# Patient Record
Sex: FEMALE | Race: WHITE | Employment: OTHER | URBAN - METROPOLITAN AREA
[De-identification: names, ages, dates, MRNs, and addresses within clinical notes are randomized per-mention and may not be internally consistent; named-entity substitution may affect disease eponyms.]

---

## 2017-01-09 ENCOUNTER — OFFICE VISIT (OUTPATIENT)
Dept: INTERNAL MEDICINE CLINIC | Age: 72
End: 2017-01-09

## 2017-01-09 VITALS
TEMPERATURE: 98.2 F | DIASTOLIC BLOOD PRESSURE: 74 MMHG | SYSTOLIC BLOOD PRESSURE: 140 MMHG | HEART RATE: 68 BPM | HEIGHT: 65 IN | BODY MASS INDEX: 43.85 KG/M2 | WEIGHT: 263.2 LBS

## 2017-01-09 DIAGNOSIS — J02.9 SORE THROAT: Primary | ICD-10-CM

## 2017-01-09 PROCEDURE — 99213 OFFICE O/P EST LOW 20 MIN: CPT | Performed by: INTERNAL MEDICINE

## 2017-01-09 ASSESSMENT — ENCOUNTER SYMPTOMS
ABDOMINAL PAIN: 0
SORE THROAT: 1
COUGH: 0
ABDOMINAL DISTENTION: 0

## 2017-01-11 LAB — THROAT CULTURE: NORMAL

## 2017-01-26 RX ORDER — SIMVASTATIN 20 MG
TABLET ORAL
Qty: 90 TABLET | Refills: 0 | Status: SHIPPED | OUTPATIENT
Start: 2017-01-26 | End: 2017-04-23 | Stop reason: SDUPTHER

## 2017-02-02 RX ORDER — ESCITALOPRAM OXALATE 10 MG/1
TABLET ORAL
Qty: 30 TABLET | Refills: 5 | Status: SHIPPED | OUTPATIENT
Start: 2017-02-02 | End: 2017-07-31 | Stop reason: SDUPTHER

## 2017-02-08 RX ORDER — PEN NEEDLE, DIABETIC 31 GX5/16"
NEEDLE, DISPOSABLE MISCELLANEOUS
Qty: 100 EACH | Refills: 3 | Status: SHIPPED | OUTPATIENT
Start: 2017-02-08 | End: 2017-06-26 | Stop reason: SDUPTHER

## 2017-02-27 ENCOUNTER — OFFICE VISIT (OUTPATIENT)
Dept: INTERNAL MEDICINE CLINIC | Age: 72
End: 2017-02-27

## 2017-02-27 VITALS
TEMPERATURE: 98.2 F | WEIGHT: 274.2 LBS | BODY MASS INDEX: 45.68 KG/M2 | HEART RATE: 64 BPM | DIASTOLIC BLOOD PRESSURE: 60 MMHG | HEIGHT: 65 IN | SYSTOLIC BLOOD PRESSURE: 130 MMHG

## 2017-02-27 DIAGNOSIS — E66.9 DIABETES MELLITUS TYPE 2 IN OBESE (HCC): Primary | ICD-10-CM

## 2017-02-27 DIAGNOSIS — I10 ESSENTIAL HYPERTENSION: ICD-10-CM

## 2017-02-27 DIAGNOSIS — E11.69 DIABETES MELLITUS TYPE 2 IN OBESE (HCC): Primary | ICD-10-CM

## 2017-02-27 DIAGNOSIS — E66.01 MORBID OBESITY WITH BMI OF 45.0-49.9, ADULT (HCC): ICD-10-CM

## 2017-02-27 DIAGNOSIS — E78.5 HYPERLIPIDEMIA, UNSPECIFIED HYPERLIPIDEMIA TYPE: ICD-10-CM

## 2017-02-27 DIAGNOSIS — J40 BRONCHITIS: ICD-10-CM

## 2017-02-27 LAB
A/G RATIO: 1.3 (ref 1.1–2.2)
ALBUMIN SERPL-MCNC: 3.6 G/DL (ref 3.4–5)
ALP BLD-CCNC: 80 U/L (ref 40–129)
ALT SERPL-CCNC: 15 U/L (ref 10–40)
ANION GAP SERPL CALCULATED.3IONS-SCNC: 12 MMOL/L (ref 3–16)
AST SERPL-CCNC: 16 U/L (ref 15–37)
BILIRUB SERPL-MCNC: 0.3 MG/DL (ref 0–1)
BUN BLDV-MCNC: 11 MG/DL (ref 7–20)
CALCIUM SERPL-MCNC: 9 MG/DL (ref 8.3–10.6)
CHLORIDE BLD-SCNC: 103 MMOL/L (ref 99–110)
CHOLESTEROL, TOTAL: 159 MG/DL (ref 0–199)
CO2: 29 MMOL/L (ref 21–32)
CREAT SERPL-MCNC: 0.8 MG/DL (ref 0.6–1.2)
GFR AFRICAN AMERICAN: >60
GFR NON-AFRICAN AMERICAN: >60
GLOBULIN: 2.8 G/DL
GLUCOSE BLD-MCNC: 96 MG/DL (ref 70–99)
HBA1C MFR BLD: 7.7 %
HDLC SERPL-MCNC: 54 MG/DL (ref 40–60)
LDL CHOLESTEROL CALCULATED: 84 MG/DL
POTASSIUM SERPL-SCNC: 4.3 MMOL/L (ref 3.5–5.1)
SODIUM BLD-SCNC: 144 MMOL/L (ref 136–145)
TOTAL PROTEIN: 6.4 G/DL (ref 6.4–8.2)
TRIGL SERPL-MCNC: 104 MG/DL (ref 0–150)
VLDLC SERPL CALC-MCNC: 21 MG/DL

## 2017-02-27 PROCEDURE — 83036 HEMOGLOBIN GLYCOSYLATED A1C: CPT | Performed by: INTERNAL MEDICINE

## 2017-02-27 PROCEDURE — 99214 OFFICE O/P EST MOD 30 MIN: CPT | Performed by: INTERNAL MEDICINE

## 2017-02-27 RX ORDER — AZITHROMYCIN 250 MG/1
TABLET, FILM COATED ORAL
Qty: 1 PACKET | Refills: 0 | Status: SHIPPED | OUTPATIENT
Start: 2017-02-27 | End: 2017-03-09

## 2017-02-27 ASSESSMENT — ENCOUNTER SYMPTOMS
SHORTNESS OF BREATH: 0
WHEEZING: 0
CHEST TIGHTNESS: 0
COUGH: 1

## 2017-03-06 RX ORDER — INSULIN GLARGINE 100 [IU]/ML
INJECTION, SOLUTION SUBCUTANEOUS
Refills: 1 | OUTPATIENT
Start: 2017-03-06

## 2017-03-27 RX ORDER — PIOGLITAZONEHYDROCHLORIDE 15 MG/1
TABLET ORAL
Qty: 30 TABLET | Refills: 2 | Status: SHIPPED | OUTPATIENT
Start: 2017-03-27 | End: 2017-04-13 | Stop reason: SINTOL

## 2017-04-03 RX ORDER — INSULIN GLARGINE 100 [IU]/ML
INJECTION, SOLUTION SUBCUTANEOUS
Qty: 15 PEN | Refills: 1 | Status: SHIPPED | OUTPATIENT
Start: 2017-04-03 | End: 2017-05-25 | Stop reason: SDUPTHER

## 2017-04-10 RX ORDER — AMLODIPINE BESYLATE 10 MG/1
TABLET ORAL
Qty: 90 TABLET | Refills: 0 | Status: SHIPPED | OUTPATIENT
Start: 2017-04-10 | End: 2017-07-08 | Stop reason: SDUPTHER

## 2017-04-12 ENCOUNTER — TELEPHONE (OUTPATIENT)
Dept: INTERNAL MEDICINE CLINIC | Age: 72
End: 2017-04-12

## 2017-04-24 RX ORDER — LANCETS 30 GAUGE
1 EACH MISCELLANEOUS 3 TIMES DAILY
Qty: 100 EACH | Refills: 3 | Status: SHIPPED | OUTPATIENT
Start: 2017-04-24 | End: 2019-08-22 | Stop reason: SDUPTHER

## 2017-04-24 RX ORDER — SIMVASTATIN 20 MG
TABLET ORAL
Qty: 90 TABLET | Refills: 0 | Status: SHIPPED | OUTPATIENT
Start: 2017-04-24 | End: 2017-07-20 | Stop reason: SDUPTHER

## 2017-05-08 RX ORDER — GLIMEPIRIDE 4 MG/1
TABLET ORAL
Qty: 135 TABLET | Refills: 1 | Status: SHIPPED | OUTPATIENT
Start: 2017-05-08 | End: 2017-07-31 | Stop reason: SDUPTHER

## 2017-05-26 RX ORDER — INSULIN GLARGINE 100 [IU]/ML
INJECTION, SOLUTION SUBCUTANEOUS
Qty: 15 PEN | Refills: 3 | Status: SHIPPED | OUTPATIENT
Start: 2017-05-26 | End: 2017-06-26 | Stop reason: CLARIF

## 2017-06-26 ENCOUNTER — OFFICE VISIT (OUTPATIENT)
Dept: INTERNAL MEDICINE CLINIC | Age: 72
End: 2017-06-26

## 2017-06-26 VITALS
HEIGHT: 65 IN | WEIGHT: 283.8 LBS | BODY MASS INDEX: 47.28 KG/M2 | HEART RATE: 68 BPM | TEMPERATURE: 98.2 F | DIASTOLIC BLOOD PRESSURE: 70 MMHG | SYSTOLIC BLOOD PRESSURE: 160 MMHG

## 2017-06-26 DIAGNOSIS — E78.5 HYPERLIPIDEMIA, UNSPECIFIED HYPERLIPIDEMIA TYPE: ICD-10-CM

## 2017-06-26 DIAGNOSIS — E66.9 DIABETES MELLITUS TYPE 2 IN OBESE (HCC): Primary | ICD-10-CM

## 2017-06-26 DIAGNOSIS — E11.69 DIABETES MELLITUS TYPE 2 IN OBESE (HCC): Primary | ICD-10-CM

## 2017-06-26 DIAGNOSIS — R60.9 EDEMA, UNSPECIFIED TYPE: ICD-10-CM

## 2017-06-26 DIAGNOSIS — I10 ESSENTIAL HYPERTENSION: ICD-10-CM

## 2017-06-26 DIAGNOSIS — R01.1 SYSTOLIC MURMUR: ICD-10-CM

## 2017-06-26 LAB — HBA1C MFR BLD: 8.1 %

## 2017-06-26 PROCEDURE — 99214 OFFICE O/P EST MOD 30 MIN: CPT | Performed by: INTERNAL MEDICINE

## 2017-06-26 PROCEDURE — 83036 HEMOGLOBIN GLYCOSYLATED A1C: CPT | Performed by: INTERNAL MEDICINE

## 2017-06-26 RX ORDER — CHLORTHALIDONE 25 MG/1
25 TABLET ORAL DAILY
Qty: 30 TABLET | Refills: 3 | Status: SHIPPED | OUTPATIENT
Start: 2017-06-26 | End: 2017-07-31 | Stop reason: SDUPTHER

## 2017-06-26 ASSESSMENT — ENCOUNTER SYMPTOMS
CHEST TIGHTNESS: 0
COUGH: 0
WHEEZING: 0

## 2017-06-27 LAB
ANION GAP SERPL CALCULATED.3IONS-SCNC: 14 MMOL/L (ref 3–16)
BUN BLDV-MCNC: 14 MG/DL (ref 7–20)
CALCIUM SERPL-MCNC: 9.1 MG/DL (ref 8.3–10.6)
CHLORIDE BLD-SCNC: 102 MMOL/L (ref 99–110)
CO2: 28 MMOL/L (ref 21–32)
CREAT SERPL-MCNC: 0.8 MG/DL (ref 0.6–1.2)
CREATININE URINE: 256.5 MG/DL (ref 28–259)
GFR AFRICAN AMERICAN: >60
GFR NON-AFRICAN AMERICAN: >60
GLUCOSE BLD-MCNC: 64 MG/DL (ref 70–99)
MICROALBUMIN UR-MCNC: 1.3 MG/DL
MICROALBUMIN/CREAT UR-RTO: 5.1 MG/G (ref 0–30)
POTASSIUM SERPL-SCNC: 4 MMOL/L (ref 3.5–5.1)
SODIUM BLD-SCNC: 144 MMOL/L (ref 136–145)

## 2017-06-30 ENCOUNTER — TELEPHONE (OUTPATIENT)
Dept: INTERNAL MEDICINE CLINIC | Age: 72
End: 2017-06-30

## 2017-07-03 ENCOUNTER — TELEPHONE (OUTPATIENT)
Dept: INTERNAL MEDICINE CLINIC | Age: 72
End: 2017-07-03

## 2017-07-10 RX ORDER — AMLODIPINE BESYLATE 10 MG/1
TABLET ORAL
Qty: 90 TABLET | Refills: 0 | Status: SHIPPED | OUTPATIENT
Start: 2017-07-10 | End: 2017-10-05 | Stop reason: SDUPTHER

## 2017-07-20 RX ORDER — SIMVASTATIN 20 MG
TABLET ORAL
Qty: 90 TABLET | Refills: 0 | Status: SHIPPED | OUTPATIENT
Start: 2017-07-20 | End: 2017-10-14 | Stop reason: SDUPTHER

## 2017-07-31 ENCOUNTER — OFFICE VISIT (OUTPATIENT)
Dept: INTERNAL MEDICINE CLINIC | Age: 72
End: 2017-07-31

## 2017-07-31 VITALS
BODY MASS INDEX: 46.22 KG/M2 | TEMPERATURE: 98.3 F | WEIGHT: 277.4 LBS | SYSTOLIC BLOOD PRESSURE: 130 MMHG | HEART RATE: 70 BPM | HEIGHT: 65 IN | DIASTOLIC BLOOD PRESSURE: 70 MMHG

## 2017-07-31 DIAGNOSIS — E11.9 TYPE 2 DIABETES MELLITUS WITHOUT COMPLICATION, WITH LONG-TERM CURRENT USE OF INSULIN (HCC): Primary | ICD-10-CM

## 2017-07-31 DIAGNOSIS — Z79.4 TYPE 2 DIABETES MELLITUS WITHOUT COMPLICATION, WITH LONG-TERM CURRENT USE OF INSULIN (HCC): Primary | ICD-10-CM

## 2017-07-31 PROCEDURE — 99213 OFFICE O/P EST LOW 20 MIN: CPT | Performed by: INTERNAL MEDICINE

## 2017-07-31 RX ORDER — GLIMEPIRIDE 4 MG/1
TABLET ORAL
Qty: 135 TABLET | Refills: 2 | Status: SHIPPED | OUTPATIENT
Start: 2017-07-31 | End: 2018-10-01 | Stop reason: SDUPTHER

## 2017-07-31 RX ORDER — CHLORTHALIDONE 25 MG/1
25 TABLET ORAL DAILY
Qty: 30 TABLET | Refills: 3 | Status: SHIPPED | OUTPATIENT
Start: 2017-07-31 | End: 2018-02-17 | Stop reason: SDUPTHER

## 2017-07-31 RX ORDER — ESCITALOPRAM OXALATE 10 MG/1
TABLET ORAL
Qty: 30 TABLET | Refills: 5 | Status: SHIPPED | OUTPATIENT
Start: 2017-07-31 | End: 2017-12-27

## 2017-07-31 ASSESSMENT — ENCOUNTER SYMPTOMS
SHORTNESS OF BREATH: 0
CHEST TIGHTNESS: 0
WHEEZING: 0

## 2017-09-25 ENCOUNTER — OFFICE VISIT (OUTPATIENT)
Dept: INTERNAL MEDICINE CLINIC | Age: 72
End: 2017-09-25

## 2017-09-25 VITALS
HEIGHT: 64 IN | SYSTOLIC BLOOD PRESSURE: 136 MMHG | HEART RATE: 80 BPM | BODY MASS INDEX: 47.19 KG/M2 | TEMPERATURE: 98.3 F | WEIGHT: 276.4 LBS | DIASTOLIC BLOOD PRESSURE: 68 MMHG

## 2017-09-25 DIAGNOSIS — Z11.59 ENCOUNTER FOR HEPATITIS C SCREENING TEST FOR LOW RISK PATIENT: ICD-10-CM

## 2017-09-25 DIAGNOSIS — E11.69 DIABETES MELLITUS TYPE 2 IN OBESE (HCC): ICD-10-CM

## 2017-09-25 DIAGNOSIS — E11.69 DIABETES MELLITUS TYPE 2 IN OBESE (HCC): Primary | ICD-10-CM

## 2017-09-25 DIAGNOSIS — I10 ESSENTIAL HYPERTENSION: ICD-10-CM

## 2017-09-25 DIAGNOSIS — E66.9 DIABETES MELLITUS TYPE 2 IN OBESE (HCC): Primary | ICD-10-CM

## 2017-09-25 DIAGNOSIS — E66.9 DIABETES MELLITUS TYPE 2 IN OBESE (HCC): ICD-10-CM

## 2017-09-25 DIAGNOSIS — E78.5 HYPERLIPIDEMIA, UNSPECIFIED HYPERLIPIDEMIA TYPE: ICD-10-CM

## 2017-09-25 LAB
ANION GAP SERPL CALCULATED.3IONS-SCNC: 15 MMOL/L (ref 3–16)
BUN BLDV-MCNC: 16 MG/DL (ref 7–20)
CALCIUM SERPL-MCNC: 9.1 MG/DL (ref 8.3–10.6)
CHLORIDE BLD-SCNC: 100 MMOL/L (ref 99–110)
CHOLESTEROL, TOTAL: 148 MG/DL (ref 0–199)
CO2: 30 MMOL/L (ref 21–32)
CREAT SERPL-MCNC: 0.8 MG/DL (ref 0.6–1.2)
GFR AFRICAN AMERICAN: >60
GFR NON-AFRICAN AMERICAN: >60
GLUCOSE BLD-MCNC: 75 MG/DL (ref 70–99)
HBA1C MFR BLD: 8.2 %
HDLC SERPL-MCNC: 40 MG/DL (ref 40–60)
HEPATITIS C ANTIBODY INTERPRETATION: NORMAL
LDL CHOLESTEROL CALCULATED: 74 MG/DL
POTASSIUM SERPL-SCNC: 3.7 MMOL/L (ref 3.5–5.1)
SODIUM BLD-SCNC: 145 MMOL/L (ref 136–145)
TRIGL SERPL-MCNC: 169 MG/DL (ref 0–150)
VLDLC SERPL CALC-MCNC: 34 MG/DL

## 2017-09-25 PROCEDURE — 83036 HEMOGLOBIN GLYCOSYLATED A1C: CPT | Performed by: INTERNAL MEDICINE

## 2017-09-25 PROCEDURE — 99214 OFFICE O/P EST MOD 30 MIN: CPT | Performed by: INTERNAL MEDICINE

## 2017-09-25 ASSESSMENT — PATIENT HEALTH QUESTIONNAIRE - PHQ9
SUM OF ALL RESPONSES TO PHQ9 QUESTIONS 1 & 2: 1
SUM OF ALL RESPONSES TO PHQ QUESTIONS 1-9: 1
2. FEELING DOWN, DEPRESSED OR HOPELESS: 1
1. LITTLE INTEREST OR PLEASURE IN DOING THINGS: 0

## 2017-09-25 ASSESSMENT — ENCOUNTER SYMPTOMS
WHEEZING: 0
CHEST TIGHTNESS: 0
COUGH: 0

## 2017-10-05 RX ORDER — AMLODIPINE BESYLATE 10 MG/1
TABLET ORAL
Qty: 90 TABLET | Refills: 0 | Status: SHIPPED | OUTPATIENT
Start: 2017-10-05 | End: 2017-10-23 | Stop reason: SINTOL

## 2017-10-16 RX ORDER — SIMVASTATIN 20 MG
TABLET ORAL
Qty: 90 TABLET | Refills: 0 | Status: SHIPPED | OUTPATIENT
Start: 2017-10-16 | End: 2018-01-17 | Stop reason: SDUPTHER

## 2017-10-23 RX ORDER — LABETALOL 100 MG/1
100 TABLET, FILM COATED ORAL 2 TIMES DAILY
Qty: 60 TABLET | Refills: 3 | Status: SHIPPED | OUTPATIENT
Start: 2017-10-23 | End: 2017-12-27 | Stop reason: ALTCHOICE

## 2017-12-01 RX ORDER — INSULIN HUMAN 100 [IU]/ML
INJECTION, SUSPENSION SUBCUTANEOUS
Qty: 15 PEN | Refills: 0 | Status: SHIPPED | OUTPATIENT
Start: 2017-12-01 | End: 2017-12-20 | Stop reason: SDUPTHER

## 2017-12-18 ENCOUNTER — TELEPHONE (OUTPATIENT)
Dept: OTHER | Facility: CLINIC | Age: 72
End: 2017-12-18

## 2017-12-18 NOTE — TELEPHONE ENCOUNTER
Telephone Outcome: Left voice message.  You do not need an order to get a mammogram done as long as you go to a [facility]) [facility]

## 2017-12-27 ENCOUNTER — OFFICE VISIT (OUTPATIENT)
Dept: INTERNAL MEDICINE CLINIC | Age: 72
End: 2017-12-27

## 2017-12-27 VITALS
SYSTOLIC BLOOD PRESSURE: 126 MMHG | TEMPERATURE: 98.1 F | WEIGHT: 276 LBS | HEIGHT: 64 IN | BODY MASS INDEX: 47.12 KG/M2 | HEART RATE: 75 BPM | DIASTOLIC BLOOD PRESSURE: 62 MMHG

## 2017-12-27 DIAGNOSIS — Z23 NEEDS FLU SHOT: ICD-10-CM

## 2017-12-27 DIAGNOSIS — L03.119 CELLULITIS OF LOWER EXTREMITY, UNSPECIFIED LATERALITY: ICD-10-CM

## 2017-12-27 DIAGNOSIS — I10 ESSENTIAL HYPERTENSION: ICD-10-CM

## 2017-12-27 DIAGNOSIS — Z12.11 SCREEN FOR COLON CANCER: ICD-10-CM

## 2017-12-27 DIAGNOSIS — I87.8 VENOUS STASIS: ICD-10-CM

## 2017-12-27 DIAGNOSIS — E11.9 TYPE 2 DIABETES MELLITUS WITHOUT COMPLICATION, WITH LONG-TERM CURRENT USE OF INSULIN (HCC): ICD-10-CM

## 2017-12-27 DIAGNOSIS — E66.9 DIABETES MELLITUS TYPE 2 IN OBESE (HCC): Primary | ICD-10-CM

## 2017-12-27 DIAGNOSIS — Z79.4 TYPE 2 DIABETES MELLITUS WITHOUT COMPLICATION, WITH LONG-TERM CURRENT USE OF INSULIN (HCC): ICD-10-CM

## 2017-12-27 DIAGNOSIS — E78.5 HYPERLIPIDEMIA, UNSPECIFIED HYPERLIPIDEMIA TYPE: ICD-10-CM

## 2017-12-27 DIAGNOSIS — E11.69 DIABETES MELLITUS TYPE 2 IN OBESE (HCC): Primary | ICD-10-CM

## 2017-12-27 LAB
ANION GAP SERPL CALCULATED.3IONS-SCNC: 14 MMOL/L (ref 3–16)
BUN BLDV-MCNC: 15 MG/DL (ref 7–20)
CALCIUM SERPL-MCNC: 9.3 MG/DL (ref 8.3–10.6)
CHLORIDE BLD-SCNC: 96 MMOL/L (ref 99–110)
CO2: 30 MMOL/L (ref 21–32)
CREAT SERPL-MCNC: 0.8 MG/DL (ref 0.6–1.2)
GFR AFRICAN AMERICAN: >60
GFR NON-AFRICAN AMERICAN: >60
GLUCOSE BLD-MCNC: 186 MG/DL (ref 70–99)
HBA1C MFR BLD: 11.8 %
POTASSIUM SERPL-SCNC: 4 MMOL/L (ref 3.5–5.1)
SODIUM BLD-SCNC: 140 MMOL/L (ref 136–145)

## 2017-12-27 PROCEDURE — G8484 FLU IMMUNIZE NO ADMIN: HCPCS | Performed by: INTERNAL MEDICINE

## 2017-12-27 PROCEDURE — 83036 HEMOGLOBIN GLYCOSYLATED A1C: CPT | Performed by: INTERNAL MEDICINE

## 2017-12-27 PROCEDURE — 1123F ACP DISCUSS/DSCN MKR DOCD: CPT | Performed by: INTERNAL MEDICINE

## 2017-12-27 PROCEDURE — 99214 OFFICE O/P EST MOD 30 MIN: CPT | Performed by: INTERNAL MEDICINE

## 2017-12-27 PROCEDURE — 4040F PNEUMOC VAC/ADMIN/RCVD: CPT | Performed by: INTERNAL MEDICINE

## 2017-12-27 PROCEDURE — 90662 IIV NO PRSV INCREASED AG IM: CPT | Performed by: INTERNAL MEDICINE

## 2017-12-27 PROCEDURE — G8399 PT W/DXA RESULTS DOCUMENT: HCPCS | Performed by: INTERNAL MEDICINE

## 2017-12-27 PROCEDURE — 3014F SCREEN MAMMO DOC REV: CPT | Performed by: INTERNAL MEDICINE

## 2017-12-27 PROCEDURE — G8417 CALC BMI ABV UP PARAM F/U: HCPCS | Performed by: INTERNAL MEDICINE

## 2017-12-27 PROCEDURE — G0008 ADMIN INFLUENZA VIRUS VAC: HCPCS | Performed by: INTERNAL MEDICINE

## 2017-12-27 PROCEDURE — 1090F PRES/ABSN URINE INCON ASSESS: CPT | Performed by: INTERNAL MEDICINE

## 2017-12-27 PROCEDURE — G8427 DOCREV CUR MEDS BY ELIG CLIN: HCPCS | Performed by: INTERNAL MEDICINE

## 2017-12-27 PROCEDURE — 3017F COLORECTAL CA SCREEN DOC REV: CPT | Performed by: INTERNAL MEDICINE

## 2017-12-27 PROCEDURE — 3046F HEMOGLOBIN A1C LEVEL >9.0%: CPT | Performed by: INTERNAL MEDICINE

## 2017-12-27 PROCEDURE — 1036F TOBACCO NON-USER: CPT | Performed by: INTERNAL MEDICINE

## 2017-12-27 RX ORDER — CEPHALEXIN 500 MG/1
500 CAPSULE ORAL 4 TIMES DAILY
Qty: 28 CAPSULE | Refills: 0 | Status: SHIPPED | OUTPATIENT
Start: 2017-12-27 | End: 2018-02-12 | Stop reason: ALTCHOICE

## 2017-12-27 ASSESSMENT — ENCOUNTER SYMPTOMS
SHORTNESS OF BREATH: 0
WHEEZING: 0
ABDOMINAL DISTENTION: 0
CHEST TIGHTNESS: 0

## 2017-12-27 NOTE — PROGRESS NOTES
Subjective:      Patient ID: Mika Garnica is a 67 y.o. female. HPI  She has stopped the lexapro. She felt that this was causing muscle cramps in her legs   She reports her blood sugars are up and down   She is starting in [employer] to exercise     Fasting sugars run around 100-120   No hypoglycemia   Post prandial runs 150-160      Review of Systems   Respiratory: Negative for chest tightness, shortness of breath and wheezing. Cardiovascular: Positive for leg swelling. Gastrointestinal: Negative for abdominal distention. Objective:   Physical Exam   Constitutional: She is oriented to person, place, and time. She appears well-developed and well-nourished. HENT:   Mouth/Throat: Oropharyngeal exudate present. Eyes: Conjunctivae are normal. Pupils are equal, round, and reactive to light. Neck: No thyromegaly present. Cardiovascular: Normal rate, regular rhythm, normal heart sounds and intact distal pulses. Exam reveals no gallop and no friction rub. No murmur heard. Pulmonary/Chest: Effort normal and breath sounds normal. No respiratory distress. She has no wheezes. She has no rales. She exhibits no tenderness. Musculoskeletal: She exhibits edema (one plus edema lower legs with some stasis changes present ). Neurological: She is alert and oriented to person, place, and time. Decreased sensation monofilament both great toe   Skin: Skin is warm and dry. No significant callous formation or ulcers of the feet  Nails great toes crumbled hypertrophic  She also has some changes both legs consistent with cellulitis      Psychiatric: She has a normal mood and affect. Assessment:         1. Diabetes mellitus type 2 in [facility])  Basic Metabolic Panel    POCT glycosylated hemoglobin (Hb A1C)   2. Essential hypertension     3. Hyperlipidemia, unspecified hyperlipidemia type     4. Screen for colon cancer     5.  Type 2 diabetes mellitus without complication, with long-term current use of insulin ([facility] [address]     6. Venous stasis     7. Cellulitis of lower extremity, unspecified laterality  POCT Fecal Immunochemical Test (FIT)   8. Needs flu shot             Plan:       A 1c over 11!!    This does not correlate to what her reported sugars are  I asked her to be honest with what she is doing and reporting   She does admit that she has not been eating well   She states she has a new machine  I would like her to call next week with fasting and pre dinner glucoses   If elevated will adjust the insulin   She did not tolerate regular insulin  I once again explained that the sugars she is reporting are not correlating to the A 1 C   The 11.8 is closer to sugars in high 200-300's  May need endo referral    She states she mailed fit test  Will re submit    She is getting stasis dermatitis and cellulitis  The lexapro is NOT causing this  Elevation compression and keflex      Patient Instructions   Use lamisil cream over the counter on feet   Check your pre dinner blood sugars and call with fasting and pre dinner glucoses   Get your machine checked at the drugstore

## 2018-01-03 ENCOUNTER — TELEPHONE (OUTPATIENT)
Dept: INTERNAL MEDICINE CLINIC | Age: 73
End: 2018-01-03

## 2018-01-03 NOTE — TELEPHONE ENCOUNTER
Have her increase her morning insulin to 50 units  Once it warms up I will need fasting and before dinner readings to further adjust insulin

## 2018-01-15 RX ORDER — PEN NEEDLE, DIABETIC 31 GX5/16"
NEEDLE, DISPOSABLE MISCELLANEOUS
Qty: 100 EACH | Refills: 2 | Status: SHIPPED | OUTPATIENT
Start: 2018-01-15 | End: 2018-05-15

## 2018-01-17 RX ORDER — SIMVASTATIN 20 MG
TABLET ORAL
Qty: 90 TABLET | Refills: 0 | Status: SHIPPED | OUTPATIENT
Start: 2018-01-17 | End: 2018-04-18 | Stop reason: SDUPTHER

## 2018-02-09 ENCOUNTER — TELEPHONE (OUTPATIENT)
Dept: INTERNAL MEDICINE CLINIC | Age: 73
End: 2018-02-09

## 2018-02-12 ENCOUNTER — OFFICE VISIT (OUTPATIENT)
Dept: INTERNAL MEDICINE CLINIC | Age: 73
End: 2018-02-12

## 2018-02-12 VITALS
DIASTOLIC BLOOD PRESSURE: 58 MMHG | HEART RATE: 85 BPM | BODY MASS INDEX: 45.85 KG/M2 | TEMPERATURE: 97.6 F | WEIGHT: 268.6 LBS | SYSTOLIC BLOOD PRESSURE: 122 MMHG | HEIGHT: 64 IN

## 2018-02-12 DIAGNOSIS — J06.9 UPPER RESPIRATORY TRACT INFECTION, UNSPECIFIED TYPE: Primary | ICD-10-CM

## 2018-02-12 LAB
INFLUENZA A ANTIBODY: NORMAL
INFLUENZA B ANTIBODY: NORMAL

## 2018-02-12 PROCEDURE — G8399 PT W/DXA RESULTS DOCUMENT: HCPCS | Performed by: INTERNAL MEDICINE

## 2018-02-12 PROCEDURE — 4040F PNEUMOC VAC/ADMIN/RCVD: CPT | Performed by: INTERNAL MEDICINE

## 2018-02-12 PROCEDURE — 3014F SCREEN MAMMO DOC REV: CPT | Performed by: INTERNAL MEDICINE

## 2018-02-12 PROCEDURE — 1036F TOBACCO NON-USER: CPT | Performed by: INTERNAL MEDICINE

## 2018-02-12 PROCEDURE — G8427 DOCREV CUR MEDS BY ELIG CLIN: HCPCS | Performed by: INTERNAL MEDICINE

## 2018-02-12 PROCEDURE — G8484 FLU IMMUNIZE NO ADMIN: HCPCS | Performed by: INTERNAL MEDICINE

## 2018-02-12 PROCEDURE — 99213 OFFICE O/P EST LOW 20 MIN: CPT | Performed by: INTERNAL MEDICINE

## 2018-02-12 PROCEDURE — 1123F ACP DISCUSS/DSCN MKR DOCD: CPT | Performed by: INTERNAL MEDICINE

## 2018-02-12 PROCEDURE — 3017F COLORECTAL CA SCREEN DOC REV: CPT | Performed by: INTERNAL MEDICINE

## 2018-02-12 PROCEDURE — 1090F PRES/ABSN URINE INCON ASSESS: CPT | Performed by: INTERNAL MEDICINE

## 2018-02-12 PROCEDURE — G8417 CALC BMI ABV UP PARAM F/U: HCPCS | Performed by: INTERNAL MEDICINE

## 2018-02-12 PROCEDURE — 87804 INFLUENZA ASSAY W/OPTIC: CPT | Performed by: INTERNAL MEDICINE

## 2018-02-12 RX ORDER — AMOXICILLIN AND CLAVULANATE POTASSIUM 875; 125 MG/1; MG/1
1 TABLET, FILM COATED ORAL 2 TIMES DAILY
Qty: 20 TABLET | Refills: 0 | Status: SHIPPED | OUTPATIENT
Start: 2018-02-12 | End: 2018-04-05 | Stop reason: ALTCHOICE

## 2018-02-12 RX ORDER — LABETALOL 100 MG/1
100 TABLET, FILM COATED ORAL 2 TIMES DAILY
Qty: 60 TABLET | Refills: 3 | COMMUNITY
Start: 2018-02-12 | End: 2018-02-19 | Stop reason: SDUPTHER

## 2018-02-12 ASSESSMENT — ENCOUNTER SYMPTOMS
SINUS PRESSURE: 1
SINUS PAIN: 1
SHORTNESS OF BREATH: 0
SORE THROAT: 1
COUGH: 1
WHEEZING: 0

## 2018-02-12 NOTE — PROGRESS NOTES
Subjective:      Patient ID: Robert Officer is a 67 y.o. female. HPI  Has not been checking her blood glucoses. She is here for URI symptoms  They began on THursday  She had some sore throat and uvula was swelling. She has been having hoarseness and sinus congestion  She has also had a cough  No one else ill at this time     Review of Systems   Constitutional: Negative for fever. HENT: Positive for congestion, sinus pain, sinus pressure and sore throat. Respiratory: Positive for cough. Negative for shortness of breath and wheezing. Objective:   Physical Exam   Constitutional: She appears well-developed and well-nourished. HENT:   Right Ear: External ear normal.   Left Ear: External ear normal.   Mouth/Throat: No oropharyngeal exudate (red  uvula swollen). Eyes: EOM are normal. Pupils are equal, round, and reactive to light. Neck: No thyromegaly present. Cardiovascular: Normal rate, regular rhythm, normal heart sounds and intact distal pulses. Exam reveals no gallop and no friction rub. No murmur heard. Pulmonary/Chest: Effort normal and breath sounds normal. No respiratory distress. She has no wheezes. She has no rales. Lymphadenopathy:     She has no cervical adenopathy. Assessment:         1. Upper respiratory tract infection, unspecified type  POCT Influenza A/B            Plan:       rapid flu negative  Treat sinus infection  Patient Instructions   Take the antibiotic one tablet twice a day for five days  Take this with food  If your symptoms have significantly improved you may stop the medication. It not complete an entire 10 day course   Prn nasal saline  Coricidin hpb for symptoms  Call if symptoms worsen or do not improve.

## 2018-02-19 RX ORDER — CHLORTHALIDONE 25 MG/1
TABLET ORAL
Qty: 30 TABLET | Refills: 2 | Status: SHIPPED | OUTPATIENT
Start: 2018-02-19 | End: 2018-05-20 | Stop reason: SDUPTHER

## 2018-02-19 RX ORDER — LABETALOL 100 MG/1
TABLET, FILM COATED ORAL
Qty: 60 TABLET | Refills: 2 | Status: SHIPPED | OUTPATIENT
Start: 2018-02-19 | End: 2018-05-20 | Stop reason: SDUPTHER

## 2018-02-26 ENCOUNTER — TELEPHONE (OUTPATIENT)
Dept: INTERNAL MEDICINE CLINIC | Age: 73
End: 2018-02-26

## 2018-02-26 NOTE — TELEPHONE ENCOUNTER
PT CALLING SAYING SHE IS STILL WAITING ON AN IMMUNIZATION FORM TO BE FAXED TO HER BOSS.    SHE SAYS SHE GETS OFF AT NOON AND HOPES IT CAN GET TO HER BOSS BY THEN

## 2018-03-27 ENCOUNTER — OFFICE VISIT (OUTPATIENT)
Dept: INTERNAL MEDICINE CLINIC | Age: 73
End: 2018-03-27

## 2018-03-27 VITALS
BODY MASS INDEX: 46.06 KG/M2 | TEMPERATURE: 97.7 F | HEART RATE: 72 BPM | SYSTOLIC BLOOD PRESSURE: 124 MMHG | HEIGHT: 64 IN | DIASTOLIC BLOOD PRESSURE: 62 MMHG | WEIGHT: 269.8 LBS

## 2018-03-27 DIAGNOSIS — I89.0 LYMPHEDEMA: ICD-10-CM

## 2018-03-27 DIAGNOSIS — E66.01 MORBID OBESITY WITH BMI OF 45.0-49.9, ADULT (HCC): ICD-10-CM

## 2018-03-27 DIAGNOSIS — B35.3 TINEA PEDIS OF BOTH FEET: ICD-10-CM

## 2018-03-27 DIAGNOSIS — E66.9 DIABETES MELLITUS TYPE 2 IN OBESE (HCC): Primary | ICD-10-CM

## 2018-03-27 DIAGNOSIS — Z12.11 SCREENING FOR MALIGNANT NEOPLASM OF COLON: ICD-10-CM

## 2018-03-27 DIAGNOSIS — E11.69 DIABETES MELLITUS TYPE 2 IN OBESE (HCC): Primary | ICD-10-CM

## 2018-03-27 DIAGNOSIS — Z12.39 SCREENING FOR MALIGNANT NEOPLASM OF BREAST: ICD-10-CM

## 2018-03-27 DIAGNOSIS — I10 ESSENTIAL HYPERTENSION: ICD-10-CM

## 2018-03-27 DIAGNOSIS — L03.115 CELLULITIS OF RIGHT LOWER EXTREMITY: ICD-10-CM

## 2018-03-27 LAB
A/G RATIO: 1.2 (ref 1.1–2.2)
ALBUMIN SERPL-MCNC: 3.5 G/DL (ref 3.4–5)
ALP BLD-CCNC: 83 U/L (ref 40–129)
ALT SERPL-CCNC: 18 U/L (ref 10–40)
ANION GAP SERPL CALCULATED.3IONS-SCNC: 13 MMOL/L (ref 3–16)
AST SERPL-CCNC: 19 U/L (ref 15–37)
BILIRUB SERPL-MCNC: 0.3 MG/DL (ref 0–1)
BUN BLDV-MCNC: 12 MG/DL (ref 7–20)
CALCIUM SERPL-MCNC: 8.9 MG/DL (ref 8.3–10.6)
CHLORIDE BLD-SCNC: 94 MMOL/L (ref 99–110)
CHOLESTEROL, TOTAL: 146 MG/DL (ref 0–199)
CO2: 34 MMOL/L (ref 21–32)
CREAT SERPL-MCNC: 0.8 MG/DL (ref 0.6–1.2)
GFR AFRICAN AMERICAN: >60
GFR NON-AFRICAN AMERICAN: >60
GLOBULIN: 3 G/DL
GLUCOSE BLD-MCNC: 144 MG/DL (ref 70–99)
HBA1C MFR BLD: 10.9 %
HDLC SERPL-MCNC: 40 MG/DL (ref 40–60)
LDL CHOLESTEROL CALCULATED: 75 MG/DL
POTASSIUM SERPL-SCNC: 3.7 MMOL/L (ref 3.5–5.1)
SODIUM BLD-SCNC: 141 MMOL/L (ref 136–145)
TOTAL PROTEIN: 6.5 G/DL (ref 6.4–8.2)
TRIGL SERPL-MCNC: 153 MG/DL (ref 0–150)
VLDLC SERPL CALC-MCNC: 31 MG/DL

## 2018-03-27 PROCEDURE — 1123F ACP DISCUSS/DSCN MKR DOCD: CPT | Performed by: INTERNAL MEDICINE

## 2018-03-27 PROCEDURE — 83036 HEMOGLOBIN GLYCOSYLATED A1C: CPT | Performed by: INTERNAL MEDICINE

## 2018-03-27 PROCEDURE — G8417 CALC BMI ABV UP PARAM F/U: HCPCS | Performed by: INTERNAL MEDICINE

## 2018-03-27 PROCEDURE — 1090F PRES/ABSN URINE INCON ASSESS: CPT | Performed by: INTERNAL MEDICINE

## 2018-03-27 PROCEDURE — G8482 FLU IMMUNIZE ORDER/ADMIN: HCPCS | Performed by: INTERNAL MEDICINE

## 2018-03-27 PROCEDURE — 4040F PNEUMOC VAC/ADMIN/RCVD: CPT | Performed by: INTERNAL MEDICINE

## 2018-03-27 PROCEDURE — 3046F HEMOGLOBIN A1C LEVEL >9.0%: CPT | Performed by: INTERNAL MEDICINE

## 2018-03-27 PROCEDURE — G8427 DOCREV CUR MEDS BY ELIG CLIN: HCPCS | Performed by: INTERNAL MEDICINE

## 2018-03-27 PROCEDURE — G8399 PT W/DXA RESULTS DOCUMENT: HCPCS | Performed by: INTERNAL MEDICINE

## 2018-03-27 PROCEDURE — 3017F COLORECTAL CA SCREEN DOC REV: CPT | Performed by: INTERNAL MEDICINE

## 2018-03-27 PROCEDURE — 99214 OFFICE O/P EST MOD 30 MIN: CPT | Performed by: INTERNAL MEDICINE

## 2018-03-27 PROCEDURE — 1036F TOBACCO NON-USER: CPT | Performed by: INTERNAL MEDICINE

## 2018-03-27 PROCEDURE — 3014F SCREEN MAMMO DOC REV: CPT | Performed by: INTERNAL MEDICINE

## 2018-03-27 RX ORDER — TERBINAFINE HYDROCHLORIDE 250 MG/1
250 TABLET ORAL DAILY
Qty: 14 TABLET | Refills: 0 | Status: SHIPPED | OUTPATIENT
Start: 2018-03-27 | End: 2018-04-10

## 2018-03-27 RX ORDER — CEPHALEXIN 500 MG/1
500 CAPSULE ORAL 4 TIMES DAILY
Qty: 40 CAPSULE | Refills: 0 | Status: SHIPPED | OUTPATIENT
Start: 2018-03-27 | End: 2018-04-06

## 2018-03-27 RX ORDER — BLOOD-GLUCOSE METER
KIT MISCELLANEOUS
Qty: 1 KIT | Refills: 0 | Status: SHIPPED | OUTPATIENT
Start: 2018-03-27

## 2018-03-27 ASSESSMENT — ENCOUNTER SYMPTOMS: COLOR CHANGE: 1

## 2018-04-03 ENCOUNTER — TELEPHONE (OUTPATIENT)
Dept: INTERNAL MEDICINE CLINIC | Age: 73
End: 2018-04-03

## 2018-04-05 ENCOUNTER — HOSPITAL ENCOUNTER (OUTPATIENT)
Dept: WOUND CARE | Age: 73
Discharge: HOME | End: 2018-04-05
Attending: PODIATRIST | Admitting: PODIATRIST

## 2018-04-05 VITALS
WEIGHT: 269 LBS | TEMPERATURE: 97.9 F | RESPIRATION RATE: 18 BRPM | BODY MASS INDEX: 45.93 KG/M2 | DIASTOLIC BLOOD PRESSURE: 72 MMHG | HEIGHT: 64 IN | SYSTOLIC BLOOD PRESSURE: 122 MMHG | HEART RATE: 74 BPM

## 2018-04-05 DIAGNOSIS — L97.229 VARICOSE VEINS OF LEFT LOWER EXTREMITY WITH BOTH ULCER OF CALF AND INFLAMMATION (HCC): ICD-10-CM

## 2018-04-05 DIAGNOSIS — I83.222 VARICOSE VEINS OF LEFT LOWER EXTREMITY WITH BOTH ULCER OF CALF AND INFLAMMATION (HCC): ICD-10-CM

## 2018-04-05 DIAGNOSIS — I83.212 VARICOSE VEINS OF RIGHT LOWER EXTREMITY WITH BOTH ULCER OF CALF AND INFLAMMATION (HCC): ICD-10-CM

## 2018-04-05 DIAGNOSIS — L97.219 VARICOSE VEINS OF RIGHT LOWER EXTREMITY WITH BOTH ULCER OF CALF AND INFLAMMATION (HCC): ICD-10-CM

## 2018-04-05 LAB
GLUCOSE BLD-MCNC: 210 MG/DL (ref 70–99)
PERFORMED ON: ABNORMAL

## 2018-04-05 RX ORDER — AMLODIPINE BESYLATE 10 MG/1
10 TABLET ORAL DAILY
COMMUNITY
End: 2019-10-22 | Stop reason: ALTCHOICE

## 2018-04-11 DIAGNOSIS — Z12.11 SCREENING FOR MALIGNANT NEOPLASM OF COLON: ICD-10-CM

## 2018-04-11 DIAGNOSIS — L03.119 CELLULITIS OF LOWER EXTREMITY, UNSPECIFIED LATERALITY: ICD-10-CM

## 2018-04-11 LAB
CONTROL: NORMAL
HEMOCCULT STL QL: NORMAL

## 2018-04-11 PROCEDURE — 82274 ASSAY TEST FOR BLOOD FECAL: CPT | Performed by: INTERNAL MEDICINE

## 2018-04-12 ENCOUNTER — HOSPITAL ENCOUNTER (OUTPATIENT)
Dept: WOUND CARE | Age: 73
Discharge: HOME | End: 2018-04-12
Attending: PODIATRIST | Admitting: PODIATRIST

## 2018-04-12 VITALS
HEART RATE: 71 BPM | TEMPERATURE: 98.4 F | SYSTOLIC BLOOD PRESSURE: 161 MMHG | RESPIRATION RATE: 16 BRPM | DIASTOLIC BLOOD PRESSURE: 67 MMHG

## 2018-04-12 DIAGNOSIS — L97.219 VARICOSE VEINS OF RIGHT LOWER EXTREMITY WITH BOTH ULCER OF CALF AND INFLAMMATION (HCC): Primary | ICD-10-CM

## 2018-04-12 DIAGNOSIS — I83.222 VARICOSE VEINS OF LEFT LOWER EXTREMITY WITH BOTH ULCER OF CALF AND INFLAMMATION (HCC): ICD-10-CM

## 2018-04-12 DIAGNOSIS — L97.229 VARICOSE VEINS OF LEFT LOWER EXTREMITY WITH BOTH ULCER OF CALF AND INFLAMMATION (HCC): ICD-10-CM

## 2018-04-12 DIAGNOSIS — I83.212 VARICOSE VEINS OF RIGHT LOWER EXTREMITY WITH BOTH ULCER OF CALF AND INFLAMMATION (HCC): Primary | ICD-10-CM

## 2018-04-18 RX ORDER — SIMVASTATIN 20 MG
TABLET ORAL
Qty: 90 TABLET | Refills: 0 | Status: SHIPPED | OUTPATIENT
Start: 2018-04-18 | End: 2018-07-17 | Stop reason: SDUPTHER

## 2018-04-19 ENCOUNTER — HOSPITAL ENCOUNTER (OUTPATIENT)
Dept: WOUND CARE | Age: 73
Discharge: HOME | End: 2018-04-19
Attending: PODIATRIST | Admitting: PODIATRIST

## 2018-04-19 VITALS
DIASTOLIC BLOOD PRESSURE: 74 MMHG | SYSTOLIC BLOOD PRESSURE: 159 MMHG | HEART RATE: 77 BPM | TEMPERATURE: 97.7 F | RESPIRATION RATE: 16 BRPM

## 2018-04-19 DIAGNOSIS — I83.212 VARICOSE VEINS OF RIGHT LOWER EXTREMITY WITH BOTH ULCER OF CALF AND INFLAMMATION (HCC): Primary | ICD-10-CM

## 2018-04-19 DIAGNOSIS — L97.229 VARICOSE VEINS OF LEFT LOWER EXTREMITY WITH BOTH ULCER OF CALF AND INFLAMMATION (HCC): ICD-10-CM

## 2018-04-19 DIAGNOSIS — L97.219 VARICOSE VEINS OF RIGHT LOWER EXTREMITY WITH BOTH ULCER OF CALF AND INFLAMMATION (HCC): Primary | ICD-10-CM

## 2018-04-19 DIAGNOSIS — I83.222 VARICOSE VEINS OF LEFT LOWER EXTREMITY WITH BOTH ULCER OF CALF AND INFLAMMATION (HCC): ICD-10-CM

## 2018-04-30 ENCOUNTER — TELEPHONE (OUTPATIENT)
Dept: WOUND CARE | Age: 73
End: 2018-04-30

## 2018-05-15 ENCOUNTER — OFFICE VISIT (OUTPATIENT)
Dept: ENDOCRINOLOGY | Age: 73
End: 2018-05-15

## 2018-05-15 VITALS
WEIGHT: 264.2 LBS | OXYGEN SATURATION: 93 % | HEART RATE: 76 BPM | HEIGHT: 65 IN | RESPIRATION RATE: 16 BRPM | BODY MASS INDEX: 44.02 KG/M2 | DIASTOLIC BLOOD PRESSURE: 80 MMHG | SYSTOLIC BLOOD PRESSURE: 139 MMHG

## 2018-05-15 DIAGNOSIS — I10 ESSENTIAL HYPERTENSION: Primary | ICD-10-CM

## 2018-05-15 LAB — HBA1C MFR BLD: 9.6 %

## 2018-05-15 PROCEDURE — 3046F HEMOGLOBIN A1C LEVEL >9.0%: CPT | Performed by: INTERNAL MEDICINE

## 2018-05-15 PROCEDURE — G8417 CALC BMI ABV UP PARAM F/U: HCPCS | Performed by: INTERNAL MEDICINE

## 2018-05-15 PROCEDURE — 83036 HEMOGLOBIN GLYCOSYLATED A1C: CPT | Performed by: INTERNAL MEDICINE

## 2018-05-15 PROCEDURE — 99205 OFFICE O/P NEW HI 60 MIN: CPT | Performed by: INTERNAL MEDICINE

## 2018-05-15 PROCEDURE — G8399 PT W/DXA RESULTS DOCUMENT: HCPCS | Performed by: INTERNAL MEDICINE

## 2018-05-15 PROCEDURE — 1036F TOBACCO NON-USER: CPT | Performed by: INTERNAL MEDICINE

## 2018-05-15 PROCEDURE — 1123F ACP DISCUSS/DSCN MKR DOCD: CPT | Performed by: INTERNAL MEDICINE

## 2018-05-15 PROCEDURE — 4040F PNEUMOC VAC/ADMIN/RCVD: CPT | Performed by: INTERNAL MEDICINE

## 2018-05-15 PROCEDURE — 2022F DILAT RTA XM EVC RTNOPTHY: CPT | Performed by: INTERNAL MEDICINE

## 2018-05-15 PROCEDURE — 1090F PRES/ABSN URINE INCON ASSESS: CPT | Performed by: INTERNAL MEDICINE

## 2018-05-15 PROCEDURE — 3017F COLORECTAL CA SCREEN DOC REV: CPT | Performed by: INTERNAL MEDICINE

## 2018-05-15 PROCEDURE — G8427 DOCREV CUR MEDS BY ELIG CLIN: HCPCS | Performed by: INTERNAL MEDICINE

## 2018-05-15 RX ORDER — NAPROXEN SODIUM 220 MG
TABLET ORAL
Qty: 150 SYRINGE | Refills: 6 | Status: SHIPPED | OUTPATIENT
Start: 2018-05-15 | End: 2019-04-24 | Stop reason: SDUPTHER

## 2018-05-21 RX ORDER — CHLORTHALIDONE 25 MG/1
TABLET ORAL
Qty: 30 TABLET | Refills: 1 | Status: SHIPPED | OUTPATIENT
Start: 2018-05-21 | End: 2018-07-24 | Stop reason: SDUPTHER

## 2018-05-21 RX ORDER — LABETALOL 100 MG/1
TABLET, FILM COATED ORAL
Qty: 60 TABLET | Refills: 1 | Status: SHIPPED | OUTPATIENT
Start: 2018-05-21 | End: 2018-07-24 | Stop reason: SDUPTHER

## 2018-06-27 ENCOUNTER — OFFICE VISIT (OUTPATIENT)
Dept: INTERNAL MEDICINE CLINIC | Age: 73
End: 2018-06-27

## 2018-06-27 VITALS
HEART RATE: 70 BPM | BODY MASS INDEX: 43.55 KG/M2 | HEIGHT: 65 IN | TEMPERATURE: 97.5 F | WEIGHT: 261.4 LBS | SYSTOLIC BLOOD PRESSURE: 130 MMHG | DIASTOLIC BLOOD PRESSURE: 58 MMHG

## 2018-06-27 DIAGNOSIS — I10 ESSENTIAL HYPERTENSION: ICD-10-CM

## 2018-06-27 DIAGNOSIS — I89.0 LYMPHEDEMA: ICD-10-CM

## 2018-06-27 DIAGNOSIS — I83.212 VARICOSE VEINS OF RIGHT LOWER EXTREMITY WITH BOTH ULCER OF CALF AND INFLAMMATION (HCC): ICD-10-CM

## 2018-06-27 DIAGNOSIS — E66.9 DIABETES MELLITUS TYPE 2 IN OBESE (HCC): Primary | ICD-10-CM

## 2018-06-27 DIAGNOSIS — E11.69 DIABETES MELLITUS TYPE 2 IN OBESE (HCC): Primary | ICD-10-CM

## 2018-06-27 DIAGNOSIS — L97.219 VARICOSE VEINS OF RIGHT LOWER EXTREMITY WITH BOTH ULCER OF CALF AND INFLAMMATION (HCC): ICD-10-CM

## 2018-06-27 LAB
ANION GAP SERPL CALCULATED.3IONS-SCNC: 15 MMOL/L (ref 3–16)
BUN BLDV-MCNC: 14 MG/DL (ref 7–20)
CALCIUM SERPL-MCNC: 9.2 MG/DL (ref 8.3–10.6)
CHLORIDE BLD-SCNC: 101 MMOL/L (ref 99–110)
CO2: 32 MMOL/L (ref 21–32)
CREAT SERPL-MCNC: 0.9 MG/DL (ref 0.6–1.2)
CREATININE URINE: 184.2 MG/DL (ref 28–259)
GFR AFRICAN AMERICAN: >60
GFR NON-AFRICAN AMERICAN: >60
GLUCOSE BLD-MCNC: 43 MG/DL (ref 70–99)
MICROALBUMIN UR-MCNC: <1.2 MG/DL
MICROALBUMIN/CREAT UR-RTO: NORMAL MG/G (ref 0–30)
POTASSIUM SERPL-SCNC: 3.4 MMOL/L (ref 3.5–5.1)
SODIUM BLD-SCNC: 148 MMOL/L (ref 136–145)

## 2018-06-27 PROCEDURE — G8427 DOCREV CUR MEDS BY ELIG CLIN: HCPCS | Performed by: INTERNAL MEDICINE

## 2018-06-27 PROCEDURE — 99214 OFFICE O/P EST MOD 30 MIN: CPT | Performed by: INTERNAL MEDICINE

## 2018-06-27 PROCEDURE — G8417 CALC BMI ABV UP PARAM F/U: HCPCS | Performed by: INTERNAL MEDICINE

## 2018-06-27 PROCEDURE — 4040F PNEUMOC VAC/ADMIN/RCVD: CPT | Performed by: INTERNAL MEDICINE

## 2018-06-27 PROCEDURE — 1090F PRES/ABSN URINE INCON ASSESS: CPT | Performed by: INTERNAL MEDICINE

## 2018-06-27 PROCEDURE — 2022F DILAT RTA XM EVC RTNOPTHY: CPT | Performed by: INTERNAL MEDICINE

## 2018-06-27 PROCEDURE — 1036F TOBACCO NON-USER: CPT | Performed by: INTERNAL MEDICINE

## 2018-06-27 PROCEDURE — 1123F ACP DISCUSS/DSCN MKR DOCD: CPT | Performed by: INTERNAL MEDICINE

## 2018-06-27 PROCEDURE — 3017F COLORECTAL CA SCREEN DOC REV: CPT | Performed by: INTERNAL MEDICINE

## 2018-06-27 PROCEDURE — G8399 PT W/DXA RESULTS DOCUMENT: HCPCS | Performed by: INTERNAL MEDICINE

## 2018-06-27 PROCEDURE — 3046F HEMOGLOBIN A1C LEVEL >9.0%: CPT | Performed by: INTERNAL MEDICINE

## 2018-06-27 ASSESSMENT — ENCOUNTER SYMPTOMS
SHORTNESS OF BREATH: 0
CHEST TIGHTNESS: 0
WHEEZING: 0

## 2018-06-28 ENCOUNTER — TELEPHONE (OUTPATIENT)
Dept: INTERNAL MEDICINE CLINIC | Age: 73
End: 2018-06-28

## 2018-07-17 RX ORDER — SIMVASTATIN 20 MG
TABLET ORAL
Qty: 90 TABLET | Refills: 0 | Status: SHIPPED | OUTPATIENT
Start: 2018-07-17 | End: 2018-09-19 | Stop reason: SDUPTHER

## 2018-07-24 RX ORDER — LABETALOL 100 MG/1
TABLET, FILM COATED ORAL
Qty: 60 TABLET | Refills: 2 | Status: SHIPPED | OUTPATIENT
Start: 2018-07-24 | End: 2018-10-27 | Stop reason: SDUPTHER

## 2018-07-24 RX ORDER — CHLORTHALIDONE 25 MG/1
TABLET ORAL
Qty: 30 TABLET | Refills: 2 | Status: SHIPPED | OUTPATIENT
Start: 2018-07-24 | End: 2018-10-27 | Stop reason: SDUPTHER

## 2018-08-13 RX ORDER — PEN NEEDLE, DIABETIC 31 GX5/16"
NEEDLE, DISPOSABLE MISCELLANEOUS
Qty: 100 EACH | Refills: 2 | Status: SHIPPED | OUTPATIENT
Start: 2018-08-13

## 2018-09-20 RX ORDER — SIMVASTATIN 20 MG
TABLET ORAL
Qty: 90 TABLET | Refills: 0 | Status: SHIPPED | OUTPATIENT
Start: 2018-09-20 | End: 2019-05-31 | Stop reason: SDUPTHER

## 2018-10-01 ENCOUNTER — OFFICE VISIT (OUTPATIENT)
Dept: PRIMARY CARE CLINIC | Age: 73
End: 2018-10-01
Payer: MEDICARE

## 2018-10-01 VITALS
WEIGHT: 266 LBS | SYSTOLIC BLOOD PRESSURE: 130 MMHG | HEIGHT: 65 IN | DIASTOLIC BLOOD PRESSURE: 58 MMHG | HEART RATE: 81 BPM | TEMPERATURE: 98 F | BODY MASS INDEX: 44.32 KG/M2

## 2018-10-01 DIAGNOSIS — I10 ESSENTIAL HYPERTENSION: ICD-10-CM

## 2018-10-01 DIAGNOSIS — I89.0 LYMPH EDEMA: ICD-10-CM

## 2018-10-01 DIAGNOSIS — E78.5 HYPERLIPIDEMIA, UNSPECIFIED HYPERLIPIDEMIA TYPE: ICD-10-CM

## 2018-10-01 LAB
ANION GAP SERPL CALCULATED.3IONS-SCNC: 11 MMOL/L (ref 3–16)
BUN BLDV-MCNC: 13 MG/DL (ref 7–20)
CALCIUM SERPL-MCNC: 9.4 MG/DL (ref 8.3–10.6)
CHLORIDE BLD-SCNC: 97 MMOL/L (ref 99–110)
CO2: 33 MMOL/L (ref 21–32)
CREAT SERPL-MCNC: 0.9 MG/DL (ref 0.6–1.2)
GFR AFRICAN AMERICAN: >60
GFR NON-AFRICAN AMERICAN: >60
GLUCOSE BLD-MCNC: 65 MG/DL (ref 70–99)
HBA1C MFR BLD: 8.8 %
POTASSIUM SERPL-SCNC: 3.5 MMOL/L (ref 3.5–5.1)
SODIUM BLD-SCNC: 141 MMOL/L (ref 136–145)

## 2018-10-01 PROCEDURE — 83036 HEMOGLOBIN GLYCOSYLATED A1C: CPT | Performed by: INTERNAL MEDICINE

## 2018-10-01 PROCEDURE — 99214 OFFICE O/P EST MOD 30 MIN: CPT | Performed by: INTERNAL MEDICINE

## 2018-10-01 RX ORDER — GLIMEPIRIDE 4 MG/1
TABLET ORAL
Qty: 135 TABLET | Refills: 2
Start: 2018-10-01 | End: 2018-11-02 | Stop reason: SDUPTHER

## 2018-10-01 ASSESSMENT — ENCOUNTER SYMPTOMS
WHEEZING: 0
CHEST TIGHTNESS: 0

## 2018-10-29 RX ORDER — LABETALOL 100 MG/1
TABLET, FILM COATED ORAL
Qty: 60 TABLET | Refills: 1 | Status: SHIPPED | OUTPATIENT
Start: 2018-10-29 | End: 2018-12-28 | Stop reason: SDUPTHER

## 2018-10-29 RX ORDER — CHLORTHALIDONE 25 MG/1
TABLET ORAL
Qty: 30 TABLET | Refills: 1 | Status: SHIPPED | OUTPATIENT
Start: 2018-10-29 | End: 2018-12-28 | Stop reason: SDUPTHER

## 2018-10-31 ENCOUNTER — OFFICE VISIT (OUTPATIENT)
Dept: PRIMARY CARE CLINIC | Age: 73
End: 2018-10-31
Payer: MEDICARE

## 2018-10-31 VITALS
DIASTOLIC BLOOD PRESSURE: 78 MMHG | WEIGHT: 271 LBS | RESPIRATION RATE: 16 BRPM | SYSTOLIC BLOOD PRESSURE: 134 MMHG | HEART RATE: 78 BPM | HEIGHT: 65 IN | BODY MASS INDEX: 45.15 KG/M2 | OXYGEN SATURATION: 98 %

## 2018-10-31 PROCEDURE — G8427 DOCREV CUR MEDS BY ELIG CLIN: HCPCS | Performed by: INTERNAL MEDICINE

## 2018-10-31 PROCEDURE — G8484 FLU IMMUNIZE NO ADMIN: HCPCS | Performed by: INTERNAL MEDICINE

## 2018-10-31 PROCEDURE — 4040F PNEUMOC VAC/ADMIN/RCVD: CPT | Performed by: INTERNAL MEDICINE

## 2018-10-31 PROCEDURE — G8510 SCR DEP NEG, NO PLAN REQD: HCPCS | Performed by: INTERNAL MEDICINE

## 2018-10-31 PROCEDURE — 3045F PR MOST RECENT HEMOGLOBIN A1C LEVEL 7.0-9.0%: CPT | Performed by: INTERNAL MEDICINE

## 2018-10-31 PROCEDURE — 1101F PT FALLS ASSESS-DOCD LE1/YR: CPT | Performed by: INTERNAL MEDICINE

## 2018-10-31 PROCEDURE — G8417 CALC BMI ABV UP PARAM F/U: HCPCS | Performed by: INTERNAL MEDICINE

## 2018-10-31 PROCEDURE — 1123F ACP DISCUSS/DSCN MKR DOCD: CPT | Performed by: INTERNAL MEDICINE

## 2018-10-31 PROCEDURE — 99213 OFFICE O/P EST LOW 20 MIN: CPT | Performed by: INTERNAL MEDICINE

## 2018-10-31 PROCEDURE — 3017F COLORECTAL CA SCREEN DOC REV: CPT | Performed by: INTERNAL MEDICINE

## 2018-10-31 PROCEDURE — 1090F PRES/ABSN URINE INCON ASSESS: CPT | Performed by: INTERNAL MEDICINE

## 2018-10-31 PROCEDURE — 2022F DILAT RTA XM EVC RTNOPTHY: CPT | Performed by: INTERNAL MEDICINE

## 2018-10-31 PROCEDURE — G8399 PT W/DXA RESULTS DOCUMENT: HCPCS | Performed by: INTERNAL MEDICINE

## 2018-10-31 PROCEDURE — 1036F TOBACCO NON-USER: CPT | Performed by: INTERNAL MEDICINE

## 2018-10-31 RX ORDER — FLASH GLUCOSE SENSOR
KIT MISCELLANEOUS
Qty: 2 EACH | Refills: 2 | Status: SHIPPED | OUTPATIENT
Start: 2018-10-31 | End: 2018-11-28 | Stop reason: SDUPTHER

## 2018-10-31 RX ORDER — FLASH GLUCOSE SCANNING READER
EACH MISCELLANEOUS
Qty: 1 DEVICE | Refills: 0 | Status: SHIPPED | OUTPATIENT
Start: 2018-10-31 | End: 2018-11-28 | Stop reason: SDUPTHER

## 2018-10-31 ASSESSMENT — PATIENT HEALTH QUESTIONNAIRE - PHQ9
2. FEELING DOWN, DEPRESSED OR HOPELESS: 0
SUM OF ALL RESPONSES TO PHQ9 QUESTIONS 1 & 2: 0
SUM OF ALL RESPONSES TO PHQ QUESTIONS 1-9: 0
SUM OF ALL RESPONSES TO PHQ QUESTIONS 1-9: 0
1. LITTLE INTEREST OR PLEASURE IN DOING THINGS: 0

## 2018-10-31 NOTE — PATIENT INSTRUCTIONS
For the REGULAR INSULIN SLIDING SCALE AT DINNER  TAKE 15 UNITS PLUS  200-250  2 UNITS  250-300  4 UNITS  301-350   6 UNITS  351-400    8 UNITS  OVER 400 CALL

## 2018-11-28 ENCOUNTER — OFFICE VISIT (OUTPATIENT)
Dept: PRIMARY CARE CLINIC | Age: 73
End: 2018-11-28
Payer: MEDICARE

## 2018-11-28 VITALS
WEIGHT: 271 LBS | SYSTOLIC BLOOD PRESSURE: 120 MMHG | OXYGEN SATURATION: 98 % | DIASTOLIC BLOOD PRESSURE: 80 MMHG | BODY MASS INDEX: 45.15 KG/M2 | HEIGHT: 65 IN | HEART RATE: 72 BPM | RESPIRATION RATE: 16 BRPM

## 2018-11-28 DIAGNOSIS — E66.9 DIABETES MELLITUS TYPE 2 IN OBESE (HCC): Primary | ICD-10-CM

## 2018-11-28 DIAGNOSIS — E11.69 DIABETES MELLITUS TYPE 2 IN OBESE (HCC): Primary | ICD-10-CM

## 2018-11-28 LAB — GLUCOSE BLD-MCNC: 243 MG/DL

## 2018-11-28 PROCEDURE — 3045F PR MOST RECENT HEMOGLOBIN A1C LEVEL 7.0-9.0%: CPT | Performed by: INTERNAL MEDICINE

## 2018-11-28 PROCEDURE — 1101F PT FALLS ASSESS-DOCD LE1/YR: CPT | Performed by: INTERNAL MEDICINE

## 2018-11-28 PROCEDURE — 2022F DILAT RTA XM EVC RTNOPTHY: CPT | Performed by: INTERNAL MEDICINE

## 2018-11-28 PROCEDURE — 1123F ACP DISCUSS/DSCN MKR DOCD: CPT | Performed by: INTERNAL MEDICINE

## 2018-11-28 PROCEDURE — G8484 FLU IMMUNIZE NO ADMIN: HCPCS | Performed by: INTERNAL MEDICINE

## 2018-11-28 PROCEDURE — 1090F PRES/ABSN URINE INCON ASSESS: CPT | Performed by: INTERNAL MEDICINE

## 2018-11-28 PROCEDURE — G8417 CALC BMI ABV UP PARAM F/U: HCPCS | Performed by: INTERNAL MEDICINE

## 2018-11-28 PROCEDURE — 82962 GLUCOSE BLOOD TEST: CPT | Performed by: INTERNAL MEDICINE

## 2018-11-28 PROCEDURE — 3017F COLORECTAL CA SCREEN DOC REV: CPT | Performed by: INTERNAL MEDICINE

## 2018-11-28 PROCEDURE — G8428 CUR MEDS NOT DOCUMENT: HCPCS | Performed by: INTERNAL MEDICINE

## 2018-11-28 PROCEDURE — 1036F TOBACCO NON-USER: CPT | Performed by: INTERNAL MEDICINE

## 2018-11-28 PROCEDURE — 99213 OFFICE O/P EST LOW 20 MIN: CPT | Performed by: INTERNAL MEDICINE

## 2018-11-28 PROCEDURE — 4040F PNEUMOC VAC/ADMIN/RCVD: CPT | Performed by: INTERNAL MEDICINE

## 2018-11-28 PROCEDURE — G8399 PT W/DXA RESULTS DOCUMENT: HCPCS | Performed by: INTERNAL MEDICINE

## 2018-11-28 RX ORDER — FLASH GLUCOSE SCANNING READER
EACH MISCELLANEOUS
Qty: 1 DEVICE | Refills: 0 | Status: SHIPPED | OUTPATIENT
Start: 2018-11-28 | End: 2018-12-24 | Stop reason: SDUPTHER

## 2018-11-28 RX ORDER — FLASH GLUCOSE SENSOR
KIT MISCELLANEOUS
Qty: 2 EACH | Refills: 2 | Status: SHIPPED | OUTPATIENT
Start: 2018-11-28 | End: 2018-12-24 | Stop reason: SDUPTHER

## 2018-12-24 ENCOUNTER — OFFICE VISIT (OUTPATIENT)
Dept: PRIMARY CARE CLINIC | Age: 73
End: 2018-12-24
Payer: MEDICARE

## 2018-12-24 VITALS
WEIGHT: 278 LBS | SYSTOLIC BLOOD PRESSURE: 138 MMHG | HEART RATE: 68 BPM | DIASTOLIC BLOOD PRESSURE: 66 MMHG | BODY MASS INDEX: 46.26 KG/M2

## 2018-12-24 DIAGNOSIS — Z23 NEEDS FLU SHOT: ICD-10-CM

## 2018-12-24 PROCEDURE — 4040F PNEUMOC VAC/ADMIN/RCVD: CPT | Performed by: INTERNAL MEDICINE

## 2018-12-24 PROCEDURE — 3045F PR MOST RECENT HEMOGLOBIN A1C LEVEL 7.0-9.0%: CPT | Performed by: INTERNAL MEDICINE

## 2018-12-24 PROCEDURE — G8482 FLU IMMUNIZE ORDER/ADMIN: HCPCS | Performed by: INTERNAL MEDICINE

## 2018-12-24 PROCEDURE — G8399 PT W/DXA RESULTS DOCUMENT: HCPCS | Performed by: INTERNAL MEDICINE

## 2018-12-24 PROCEDURE — 3017F COLORECTAL CA SCREEN DOC REV: CPT | Performed by: INTERNAL MEDICINE

## 2018-12-24 PROCEDURE — G8417 CALC BMI ABV UP PARAM F/U: HCPCS | Performed by: INTERNAL MEDICINE

## 2018-12-24 PROCEDURE — 1101F PT FALLS ASSESS-DOCD LE1/YR: CPT | Performed by: INTERNAL MEDICINE

## 2018-12-24 PROCEDURE — 2022F DILAT RTA XM EVC RTNOPTHY: CPT | Performed by: INTERNAL MEDICINE

## 2018-12-24 PROCEDURE — 99213 OFFICE O/P EST LOW 20 MIN: CPT | Performed by: INTERNAL MEDICINE

## 2018-12-24 PROCEDURE — 1123F ACP DISCUSS/DSCN MKR DOCD: CPT | Performed by: INTERNAL MEDICINE

## 2018-12-24 PROCEDURE — 1036F TOBACCO NON-USER: CPT | Performed by: INTERNAL MEDICINE

## 2018-12-24 PROCEDURE — G0008 ADMIN INFLUENZA VIRUS VAC: HCPCS | Performed by: INTERNAL MEDICINE

## 2018-12-24 PROCEDURE — 90662 IIV NO PRSV INCREASED AG IM: CPT | Performed by: INTERNAL MEDICINE

## 2018-12-24 PROCEDURE — G8428 CUR MEDS NOT DOCUMENT: HCPCS | Performed by: INTERNAL MEDICINE

## 2018-12-24 PROCEDURE — 1090F PRES/ABSN URINE INCON ASSESS: CPT | Performed by: INTERNAL MEDICINE

## 2018-12-24 RX ORDER — FLASH GLUCOSE SENSOR
KIT MISCELLANEOUS
Qty: 2 EACH | Refills: 2 | Status: SHIPPED | OUTPATIENT
Start: 2018-12-24

## 2018-12-24 RX ORDER — FLASH GLUCOSE SCANNING READER
EACH MISCELLANEOUS
Qty: 1 DEVICE | Refills: 0 | Status: SHIPPED | OUTPATIENT
Start: 2018-12-24 | End: 2020-10-27

## 2018-12-24 NOTE — PROGRESS NOTES
Vaccine Information Sheet, \"Influenza - Inactivated\"  given to Jose Kumar, or parent/legal guardian of  Jose Kumar and verbalized understanding. Patient responses:    Have you ever had a reaction to a flu vaccine? No  Are you able to eat eggs without adverse effects? Yes  Do you have any current illness? No  Have you ever had [place] Syndrome? No    Flu vaccine given per order. Please see immunization tab. Current Influenza vaccine VIS given to patient. Influenza consent form/questionnaire completed and signed. Patient responses to  Influenza consent form / questionnaire  reviewed. Vaccine given per protocol.

## 2018-12-29 ENCOUNTER — TELEPHONE (OUTPATIENT)
Dept: PAIN MANAGEMENT | Age: 73
End: 2018-12-29

## 2018-12-31 NOTE — TELEPHONE ENCOUNTER
Received DENIAL for FreeStyle Maelie 14 Day Sensor XX MISC as it is a PLAN EXCLUSION under Medicare Part D. Denial letter attached. Please advise patient.

## 2019-02-04 RX ORDER — GLIMEPIRIDE 4 MG/1
TABLET ORAL
Qty: 90 TABLET | Refills: 0 | Status: SHIPPED | OUTPATIENT
Start: 2019-02-04 | End: 2019-05-09 | Stop reason: SDUPTHER

## 2019-03-13 ENCOUNTER — TELEPHONE (OUTPATIENT)
Dept: PHARMACY | Facility: CLINIC | Age: 74
End: 2019-03-13

## 2019-03-25 ENCOUNTER — OFFICE VISIT (OUTPATIENT)
Dept: PRIMARY CARE CLINIC | Age: 74
End: 2019-03-25
Payer: MEDICARE

## 2019-03-25 VITALS
TEMPERATURE: 97.8 F | SYSTOLIC BLOOD PRESSURE: 136 MMHG | DIASTOLIC BLOOD PRESSURE: 64 MMHG | BODY MASS INDEX: 47.19 KG/M2 | HEART RATE: 72 BPM | WEIGHT: 283.6 LBS

## 2019-03-25 DIAGNOSIS — E11.65 UNCONTROLLED TYPE 2 DIABETES MELLITUS WITH HYPERGLYCEMIA (HCC): Primary | ICD-10-CM

## 2019-03-25 DIAGNOSIS — I10 ESSENTIAL HYPERTENSION: ICD-10-CM

## 2019-03-25 DIAGNOSIS — E66.01 CLASS 3 SEVERE OBESITY DUE TO EXCESS CALORIES WITHOUT SERIOUS COMORBIDITY WITH BODY MASS INDEX (BMI) OF 45.0 TO 49.9 IN ADULT (HCC): ICD-10-CM

## 2019-03-25 DIAGNOSIS — E78.2 MIXED HYPERLIPIDEMIA: ICD-10-CM

## 2019-03-25 LAB — HBA1C MFR BLD: 8.1 %

## 2019-03-25 PROCEDURE — 1123F ACP DISCUSS/DSCN MKR DOCD: CPT | Performed by: INTERNAL MEDICINE

## 2019-03-25 PROCEDURE — 1090F PRES/ABSN URINE INCON ASSESS: CPT | Performed by: INTERNAL MEDICINE

## 2019-03-25 PROCEDURE — G8417 CALC BMI ABV UP PARAM F/U: HCPCS | Performed by: INTERNAL MEDICINE

## 2019-03-25 PROCEDURE — 4040F PNEUMOC VAC/ADMIN/RCVD: CPT | Performed by: INTERNAL MEDICINE

## 2019-03-25 PROCEDURE — 1101F PT FALLS ASSESS-DOCD LE1/YR: CPT | Performed by: INTERNAL MEDICINE

## 2019-03-25 PROCEDURE — 3046F HEMOGLOBIN A1C LEVEL >9.0%: CPT | Performed by: INTERNAL MEDICINE

## 2019-03-25 PROCEDURE — G8399 PT W/DXA RESULTS DOCUMENT: HCPCS | Performed by: INTERNAL MEDICINE

## 2019-03-25 PROCEDURE — 83036 HEMOGLOBIN GLYCOSYLATED A1C: CPT | Performed by: INTERNAL MEDICINE

## 2019-03-25 PROCEDURE — 3017F COLORECTAL CA SCREEN DOC REV: CPT | Performed by: INTERNAL MEDICINE

## 2019-03-25 PROCEDURE — G8482 FLU IMMUNIZE ORDER/ADMIN: HCPCS | Performed by: INTERNAL MEDICINE

## 2019-03-25 PROCEDURE — 1036F TOBACCO NON-USER: CPT | Performed by: INTERNAL MEDICINE

## 2019-03-25 PROCEDURE — G8427 DOCREV CUR MEDS BY ELIG CLIN: HCPCS | Performed by: INTERNAL MEDICINE

## 2019-03-25 PROCEDURE — 2022F DILAT RTA XM EVC RTNOPTHY: CPT | Performed by: INTERNAL MEDICINE

## 2019-03-25 PROCEDURE — 99214 OFFICE O/P EST MOD 30 MIN: CPT | Performed by: INTERNAL MEDICINE

## 2019-03-25 RX ORDER — BLOOD-GLUCOSE METER
1 KIT MISCELLANEOUS DAILY
Qty: 1 KIT | Refills: 0 | Status: SHIPPED | OUTPATIENT
Start: 2019-03-25 | End: 2019-10-22

## 2019-03-25 ASSESSMENT — PATIENT HEALTH QUESTIONNAIRE - PHQ9
SUM OF ALL RESPONSES TO PHQ QUESTIONS 1-9: 0
SUM OF ALL RESPONSES TO PHQ QUESTIONS 1-9: 0
2. FEELING DOWN, DEPRESSED OR HOPELESS: 0

## 2019-04-26 RX ORDER — BLOOD SUGAR DIAGNOSTIC
STRIP MISCELLANEOUS
Qty: 100 EACH | Refills: 5 | Status: SHIPPED | OUTPATIENT
Start: 2019-04-26 | End: 2019-10-22 | Stop reason: SDUPTHER

## 2019-05-06 RX ORDER — CHLORTHALIDONE 25 MG/1
TABLET ORAL
Qty: 90 TABLET | Refills: 1 | Status: ON HOLD | OUTPATIENT
Start: 2019-05-06 | End: 2019-12-03 | Stop reason: SDUPTHER

## 2019-05-06 RX ORDER — LABETALOL 100 MG/1
TABLET, FILM COATED ORAL
Qty: 180 TABLET | Refills: 1 | Status: SHIPPED | OUTPATIENT
Start: 2019-05-06 | End: 2019-12-09 | Stop reason: SDUPTHER

## 2019-05-09 RX ORDER — GLIMEPIRIDE 4 MG/1
TABLET ORAL
Qty: 90 TABLET | Refills: 1 | Status: ON HOLD | OUTPATIENT
Start: 2019-05-09 | End: 2019-12-03 | Stop reason: HOSPADM

## 2019-05-31 RX ORDER — SIMVASTATIN 20 MG
TABLET ORAL
Qty: 90 TABLET | Refills: 1 | Status: SHIPPED | OUTPATIENT
Start: 2019-05-31 | End: 2019-12-09 | Stop reason: SDUPTHER

## 2019-06-21 ENCOUNTER — CARE COORDINATION (OUTPATIENT)
Dept: CARE COORDINATION | Age: 74
End: 2019-06-21

## 2019-06-21 ENCOUNTER — OFFICE VISIT (OUTPATIENT)
Dept: PRIMARY CARE CLINIC | Age: 74
End: 2019-06-21
Payer: MEDICARE

## 2019-06-21 VITALS
HEART RATE: 68 BPM | WEIGHT: 276.6 LBS | TEMPERATURE: 97.9 F | DIASTOLIC BLOOD PRESSURE: 70 MMHG | SYSTOLIC BLOOD PRESSURE: 138 MMHG | BODY MASS INDEX: 46.03 KG/M2

## 2019-06-21 DIAGNOSIS — Z12.11 COLON CANCER SCREENING: ICD-10-CM

## 2019-06-21 DIAGNOSIS — J45.909 MILD ASTHMA WITHOUT COMPLICATION, UNSPECIFIED WHETHER PERSISTENT: ICD-10-CM

## 2019-06-21 DIAGNOSIS — R05.9 COUGH: ICD-10-CM

## 2019-06-21 DIAGNOSIS — E78.5 HYPERLIPIDEMIA, UNSPECIFIED HYPERLIPIDEMIA TYPE: ICD-10-CM

## 2019-06-21 DIAGNOSIS — I10 ESSENTIAL HYPERTENSION: ICD-10-CM

## 2019-06-21 LAB — HBA1C MFR BLD: 8.8 %

## 2019-06-21 PROCEDURE — G8399 PT W/DXA RESULTS DOCUMENT: HCPCS | Performed by: INTERNAL MEDICINE

## 2019-06-21 PROCEDURE — 3045F PR MOST RECENT HEMOGLOBIN A1C LEVEL 7.0-9.0%: CPT | Performed by: INTERNAL MEDICINE

## 2019-06-21 PROCEDURE — 83036 HEMOGLOBIN GLYCOSYLATED A1C: CPT | Performed by: INTERNAL MEDICINE

## 2019-06-21 PROCEDURE — 4040F PNEUMOC VAC/ADMIN/RCVD: CPT | Performed by: INTERNAL MEDICINE

## 2019-06-21 PROCEDURE — 3017F COLORECTAL CA SCREEN DOC REV: CPT | Performed by: INTERNAL MEDICINE

## 2019-06-21 PROCEDURE — G8417 CALC BMI ABV UP PARAM F/U: HCPCS | Performed by: INTERNAL MEDICINE

## 2019-06-21 PROCEDURE — 1090F PRES/ABSN URINE INCON ASSESS: CPT | Performed by: INTERNAL MEDICINE

## 2019-06-21 PROCEDURE — 2022F DILAT RTA XM EVC RTNOPTHY: CPT | Performed by: INTERNAL MEDICINE

## 2019-06-21 PROCEDURE — G8427 DOCREV CUR MEDS BY ELIG CLIN: HCPCS | Performed by: INTERNAL MEDICINE

## 2019-06-21 PROCEDURE — 1036F TOBACCO NON-USER: CPT | Performed by: INTERNAL MEDICINE

## 2019-06-21 PROCEDURE — 1123F ACP DISCUSS/DSCN MKR DOCD: CPT | Performed by: INTERNAL MEDICINE

## 2019-06-21 PROCEDURE — 99214 OFFICE O/P EST MOD 30 MIN: CPT | Performed by: INTERNAL MEDICINE

## 2019-06-21 RX ORDER — AZITHROMYCIN 250 MG/1
TABLET, FILM COATED ORAL
Qty: 1 PACKET | Refills: 0 | Status: SHIPPED | OUTPATIENT
Start: 2019-06-21 | End: 2019-08-22 | Stop reason: ALTCHOICE

## 2019-06-21 RX ORDER — LANCETS 30 GAUGE
1 EACH MISCELLANEOUS DAILY
Qty: 300 EACH | Refills: 3 | Status: SHIPPED | OUTPATIENT
Start: 2019-06-21

## 2019-06-21 RX ORDER — GLUCOSAMINE HCL/CHONDROITIN SU 500-400 MG
CAPSULE ORAL
Qty: 300 STRIP | Refills: 1 | Status: SHIPPED | OUTPATIENT
Start: 2019-06-21

## 2019-06-21 RX ORDER — BLOOD-GLUCOSE METER
1 KIT MISCELLANEOUS DAILY
Qty: 1 KIT | Refills: 0 | Status: SHIPPED | OUTPATIENT
Start: 2019-06-21 | End: 2019-10-22 | Stop reason: ALTCHOICE

## 2019-06-21 RX ORDER — BUDESONIDE AND FORMOTEROL FUMARATE DIHYDRATE 160; 4.5 UG/1; UG/1
2 AEROSOL RESPIRATORY (INHALATION) 2 TIMES DAILY
Qty: 1 INHALER | Refills: 0 | COMMUNITY
Start: 2019-06-21 | End: 2019-12-09

## 2019-06-21 NOTE — PROGRESS NOTES
Gilmar Miller  YOB: 1945    Date of Service:  6/21/2019    Chief Complaint:   Gilmar Miller is a 68 y.o. female who presents for  chronic health problems. HPI: NEW PATIENT:patient came here for chronic health problems. Diabetes: not controlled . Monitoring sugar at home is not done for long time,  patient is on insulin and glimepiride. Discussed for low carb diet and good impact of exercise on health. Hypertension : stable patient is on labetalol and  chlorthalidone . Patient denies any side effects of medication. Patient is monitoring  bp at home. .  Patient is not doing exercise . Obesity: patient says she started exercise. Hyperlipidemia : stable . Patient complaints of cough with yellowish sputum. Review of Systems:  Constitutional: Negative for chills, fever, malaise/fatigue and obesity. HENT: Negative for headaches, ear discharge, ear pain, hearing loss, sore throat,   blurry vision and double vision. Respiratory:cough+, hemoptysis, sputum production, shortness of breath and wheezing. Cardiovascular: Negative for chest pain, palpitations, orthopnea, claudication and leg swelling. Gastrointestinal: Negative for abdominal pain, nausea and vomiting, constipation, diarrhea, heartburn, blood in stool, melena. Genitourinary: Negative for dysuria, flank pain, frequency, hematuria and urgency. Musculoskeletal: Negative for back pain, myalgias and neck pain. Neurological: Negative for dizziness, seizure, sensory change, focal weakness, loss of consciousness . Psychiatric/Behavioral: Negative for depression and substance abuse. The patient does not have insomnia.           Vitals:    06/21/19 0847   BP: 138/70   Pulse: 68   Temp: 97.9 °F (36.6 °C)     Wt Readings from Last 3 Encounters:   06/21/19 276 lb 9.6 oz (125.5 kg)   03/25/19 283 lb 9.6 oz (128.6 kg)   12/24/18 278 lb (126.1 kg)     BP Readings from Last 3 Encounters: [Amlodipine Besylate]      Leg swelling    Novolog [Insulin Aspart]      dizzyness    Pioglitazone      edema    Zoloft [Sertraline Hcl]      Vision loss     Current Outpatient Medications   Medication Sig Dispense Refill    simvastatin (ZOCOR) 20 MG tablet TAKE ONE TABLET BY MOUTH ONCE NIGHTLY 90 tablet 1    glimepiride (AMARYL) 4 MG tablet TAKE ONE TABLET BY MOUTH EVERY MORNING BEFORE BREAKFAST 90 tablet 1    chlorthalidone (HYGROTON) 25 MG tablet TAKE ONE TABLET BY MOUTH DAILY 90 tablet 1    labetalol (NORMODYNE) 100 MG tablet TAKE ONE TABLET BY MOUTH TWICE A  tablet 1    Insulin Syringe-Needle U-100 (BD INSULIN SYRINGE U/F) 31G X 5/16\" 0.5 ML MISC USE ONE SYRINGE FOUR TIMES A  each 5    insulin regular (HUMULIN R) 100 UNIT/ML injection 20 units lunch and dinner 2 vial 3    insulin NPH (HUMULIN N) 100 UNIT/ML injection vial 45 units in the am and 25 units HS 2 vial 3    Continuous Blood Gluc Sensor (FREESTYLE VÍCTOR 14 DAY SENSOR) MISC CHECK GLUCOSE 4 TIMES DAILY 2 each 2    B-D ULTRAFINE III SHORT PEN 31G X 8 MM MISC USE WITH INSULIN TWO TIMES A DAY AS DIRECTED 100 each 2    escitalopram (LEXAPRO) 10 MG tablet TAKE ONE-HALF TABLET BY MOUTH DAILY 45 tablet 4    amLODIPine (NORVASC) 10 MG tablet Take 10 mg by mouth daily      Lancets MISC 1 each by Other route 3 times daily 100 each 3    ONETOUCH DELICA LANCETS 40M MISC USE TO TEST BLOOD SUGAR THREE TIMES A DAY 1 each 0    Blood Glucose Monitoring Suppl (BLOOD GLUCOSE METER) KIT 1 Device by Does not apply route 3 times daily. 1 kit 0    Multiple Vitamins-Minerals (THERAPEUTIC MULTIVITAMIN-MINERALS) tablet Take 1 tablet by mouth daily.       glucose monitoring kit (FREESTYLE) monitoring kit 1 kit by Does not apply route daily 1 kit 0    Continuous Blood Gluc  (FREESTYLE VÍCTOR 14 DAY READER) CARMEN RECORD BLOOD SUGAR 4 TIMES DAILY 1 Device 0    ONE TOUCH ULTRA TEST strip USE TO TEST BLOOD SUGAR TWO TIMES A DAY AS DIRECTED 100 file     Forced sexual activity: Not on file   Other Topics Concern    Not on file   Social History Narrative    Not on file       Assessment/Plan:    1. Uncontrolled type 2 diabetes mellitus with hyperglycemia (HCC)  a1c 8.8 improved from previous  Insulin  And glimepiride. Patient is not checking sugar at home she is not taking insulin accordingly and she is going this for years . Patient verbalize understanding of bad impact of not checking sugar and not controlled DM. Patient declined to see endocrinologist.   Monitor blood sugar at home, new glucometer ordered. Regular exercise and low carb diet. 2. Mixed hyperlipidemia  stable  - Lipid Panel; Future    3.  Essential hypertension  stable  labetalol and  Chlorthalidone  Monitor blood pressure regularly  Regular exercise and low salt diet    Cough  z-pack  Patient has asthma she is on albuterol and she will start Symbicort

## 2019-06-21 NOTE — LETTER
6/21/2019    [facility]    Dear Yris Le    Please disregard this letter if you have already returned the voice message I left today. My name is Blayne Gill and I wanted to take this opportunity to introduce to you my role as Care Coordinator in support of patients at your [facility] Provider practice. As your Care Coordinator, I will work with you to help achieve the health care goals you and Sally Otoole MD have established. If there exists any barriers, as your nurse Care Coordinator, I can help address any barriers too! Care Coordination is a FREE service, offering extended care team support to you, for a duration of approximately 90 days. You may graduate from the Care Coordination support sooner, as well- but if you feel you need more time, we can extend the duration of time we work together, too. I look forward to talking with you soon! Thanks & Be Well!         Roberto Palmer RN BSN, [facility]  [facility] [place] [phone]

## 2019-06-24 ENCOUNTER — TELEPHONE (OUTPATIENT)
Dept: PRIMARY CARE CLINIC | Age: 74
End: 2019-06-24

## 2019-06-25 NOTE — TELEPHONE ENCOUNTER
Submitted PA for FreeStyle Lite Test strips, Key: [id number] - PA Case ID: [id number] - Rx #: [id number]  Via CMM STATUS: PENDING

## 2019-06-26 NOTE — TELEPHONE ENCOUNTER
Received APPROVAL for FreeStyle Lite Test strips through 12/31/2019. Please advise patient. Thank you.

## 2019-07-01 ENCOUNTER — TELEPHONE (OUTPATIENT)
Dept: PHARMACY | Facility: CLINIC | Age: 74
End: 2019-07-01

## 2019-07-05 NOTE — TELEPHONE ENCOUNTER
CLINICAL PHARMACY CONSULT: MED RECONCILIATION/REVIEW ADDENDUM    For Pharmacy Admin Tracking Only    PHSO: Yes  Total # of Interventions Recommended: 1  - New Order #: 0 New Medication Order Reason(s): Adherence  - Maintenance Safety Lab Monitoring #: 1  Recommended intervention potential cost savings: 0    Time Spent (min): 15    Kayleigh Barboza CPhT.   [address]

## 2019-08-14 ENCOUNTER — APPOINTMENT (OUTPATIENT)
Dept: CT IMAGING | Age: 74
End: 2019-08-14
Payer: MEDICARE

## 2019-08-14 ENCOUNTER — HOSPITAL ENCOUNTER (EMERGENCY)
Age: 74
Discharge: HOME | End: 2019-08-14
Attending: EMERGENCY MEDICINE
Payer: MEDICARE

## 2019-08-14 VITALS
SYSTOLIC BLOOD PRESSURE: 155 MMHG | RESPIRATION RATE: 18 BRPM | WEIGHT: 270 LBS | BODY MASS INDEX: 43.39 KG/M2 | HEIGHT: 66 IN | OXYGEN SATURATION: 95 % | HEART RATE: 72 BPM | DIASTOLIC BLOOD PRESSURE: 65 MMHG | TEMPERATURE: 98.3 F

## 2019-08-14 DIAGNOSIS — R55 SYNCOPE AND COLLAPSE: Primary | ICD-10-CM

## 2019-08-14 LAB
ANION GAP SERPL CALCULATED.3IONS-SCNC: 15 MMOL/L (ref 3–16)
BACTERIA: ABNORMAL /HPF
BILIRUBIN URINE: NEGATIVE
BLOOD, URINE: ABNORMAL
BUN BLDV-MCNC: 18 MG/DL (ref 7–20)
CALCIUM SERPL-MCNC: 8.4 MG/DL (ref 8.3–10.6)
CHLORIDE BLD-SCNC: 96 MMOL/L (ref 99–110)
CLARITY: CLEAR
CO2: 26 MMOL/L (ref 21–32)
COLOR: YELLOW
CREAT SERPL-MCNC: 1.1 MG/DL (ref 0.6–1.2)
EKG ATRIAL RATE: 75 BPM
EKG DIAGNOSIS: NORMAL
EKG P AXIS: 67 DEGREES
EKG P-R INTERVAL: 160 MS
EKG Q-T INTERVAL: 468 MS
EKG QRS DURATION: 124 MS
EKG QTC CALCULATION (BAZETT): 522 MS
EKG R AXIS: -56 DEGREES
EKG T AXIS: 86 DEGREES
EKG VENTRICULAR RATE: 75 BPM
GFR AFRICAN AMERICAN: 59
GFR NON-AFRICAN AMERICAN: 49
GLUCOSE BLD-MCNC: 302 MG/DL (ref 70–99)
GLUCOSE URINE: 100 MG/DL
HCT VFR BLD CALC: 34.2 % (ref 36–48)
HEMOGLOBIN: 11 G/DL (ref 12–16)
KETONES, URINE: NEGATIVE MG/DL
LEUKOCYTE ESTERASE, URINE: NEGATIVE
MCH RBC QN AUTO: 27.8 PG (ref 26–34)
MCHC RBC AUTO-ENTMCNC: 32.2 G/DL (ref 31–36)
MCV RBC AUTO: 86.5 FL (ref 80–100)
MICROSCOPIC EXAMINATION: YES
NITRITE, URINE: NEGATIVE
PDW BLD-RTO: 18.8 % (ref 12.4–15.4)
PH UA: 6 (ref 5–8)
PLATELET # BLD: 209 K/UL (ref 135–450)
PMV BLD AUTO: 8.7 FL (ref 5–10.5)
POTASSIUM REFLEX MAGNESIUM: 5.1 MMOL/L (ref 3.5–5.1)
PROTEIN UA: ABNORMAL MG/DL
RBC # BLD: 3.95 M/UL (ref 4–5.2)
RBC UA: ABNORMAL /HPF (ref 0–2)
SODIUM BLD-SCNC: 137 MMOL/L (ref 136–145)
SPECIFIC GRAVITY UA: 1.02 (ref 1–1.03)
URINE REFLEX TO CULTURE: ABNORMAL
URINE TYPE: ABNORMAL
UROBILINOGEN, URINE: 0.2 E.U./DL
WBC # BLD: 8.3 K/UL (ref 4–11)
WBC UA: ABNORMAL /HPF (ref 0–5)

## 2019-08-14 PROCEDURE — 99285 EMERGENCY DEPT VISIT HI MDM: CPT

## 2019-08-14 PROCEDURE — 96360 HYDRATION IV INFUSION INIT: CPT

## 2019-08-14 PROCEDURE — 2500000003 HC RX 250 WO HCPCS: Performed by: STUDENT IN AN ORGANIZED HEALTH CARE EDUCATION/TRAINING PROGRAM

## 2019-08-14 PROCEDURE — 85027 COMPLETE CBC AUTOMATED: CPT

## 2019-08-14 PROCEDURE — 2580000003 HC RX 258: Performed by: STUDENT IN AN ORGANIZED HEALTH CARE EDUCATION/TRAINING PROGRAM

## 2019-08-14 PROCEDURE — 93005 ELECTROCARDIOGRAM TRACING: CPT | Performed by: EMERGENCY MEDICINE

## 2019-08-14 PROCEDURE — 6370000000 HC RX 637 (ALT 250 FOR IP): Performed by: STUDENT IN AN ORGANIZED HEALTH CARE EDUCATION/TRAINING PROGRAM

## 2019-08-14 PROCEDURE — 80048 BASIC METABOLIC PNL TOTAL CA: CPT

## 2019-08-14 PROCEDURE — 4500000025 HC ED LEVEL 5 PROCEDURE

## 2019-08-14 PROCEDURE — 81001 URINALYSIS AUTO W/SCOPE: CPT

## 2019-08-14 PROCEDURE — 96361 HYDRATE IV INFUSION ADD-ON: CPT

## 2019-08-14 PROCEDURE — 70450 CT HEAD/BRAIN W/O DYE: CPT

## 2019-08-14 RX ORDER — 0.9 % SODIUM CHLORIDE 0.9 %
1000 INTRAVENOUS SOLUTION INTRAVENOUS ONCE
Status: COMPLETED | OUTPATIENT
Start: 2019-08-14 | End: 2019-08-14

## 2019-08-14 RX ORDER — LIDOCAINE HYDROCHLORIDE AND EPINEPHRINE 10; 10 MG/ML; UG/ML
20 INJECTION, SOLUTION INFILTRATION; PERINEURAL ONCE
Status: COMPLETED | OUTPATIENT
Start: 2019-08-14 | End: 2019-08-14

## 2019-08-14 RX ADMIN — LIDOCAINE HYDROCHLORIDE,EPINEPHRINE BITARTRATE 20 ML: 10; .01 INJECTION, SOLUTION INFILTRATION; PERINEURAL at 14:34

## 2019-08-14 RX ADMIN — IBUPROFEN 600 MG: 400 TABLET ORAL at 17:15

## 2019-08-14 RX ADMIN — SODIUM CHLORIDE 1000 ML: 9 INJECTION, SOLUTION INTRAVENOUS at 14:29

## 2019-08-14 ASSESSMENT — PAIN DESCRIPTION - LOCATION: LOCATION: ARM

## 2019-08-14 ASSESSMENT — PAIN SCALES - GENERAL: PAINLEVEL_OUTOF10: 1

## 2019-08-14 ASSESSMENT — PAIN DESCRIPTION - PAIN TYPE: TYPE: ACUTE PAIN

## 2019-08-14 ASSESSMENT — PAIN DESCRIPTION - FREQUENCY: FREQUENCY: CONTINUOUS

## 2019-08-14 ASSESSMENT — PAIN DESCRIPTION - ORIENTATION: ORIENTATION: RIGHT

## 2019-08-14 NOTE — ED PROVIDER NOTES
father; Heart Disease in her father. She reports that she quit smoking about 39 years ago. Her smoking use included cigarettes. She has never used smokeless tobacco. She reports that she drinks alcohol. Medications     Previous Medications    AMLODIPINE (NORVASC) 10 MG TABLET    Take 10 mg by mouth daily    AZITHROMYCIN (ZITHROMAX Z-OSMAR) 250 MG TABLET    Take 2 tablets (500 mg) on Day 1, and then take 1 tablet (250 mg) on days 2 through 5. B-D ULTRAFINE III SHORT PEN 31G X 8 MM MISC    USE WITH INSULIN TWO TIMES A DAY AS DIRECTED    BLOOD GLUCOSE MONITOR STRIPS    Test 3-4 times a day & as needed for symptoms of irregular blood glucose. DX: E11.9. Supply the strips that go with her meter and approved by insurance    BLOOD GLUCOSE MONITORING SUPPL (BLOOD GLUCOSE METER) KIT    1 Device by Does not apply route 3 times daily. BUDESONIDE-FORMOTEROL (SYMBICORT) 160-4.5 MCG/ACT AERO    Inhale 2 puffs into the lungs 2 times daily    CHLORTHALIDONE (HYGROTON) 25 MG TABLET    TAKE ONE TABLET BY MOUTH DAILY    CONTINUOUS BLOOD GLUC  (FREESTYLE VÍCTOR 14 DAY READER) CARMEN    RECORD BLOOD SUGAR 4 TIMES DAILY    CONTINUOUS BLOOD GLUC SENSOR (FREESTYLE VÍCTOR 14 DAY SENSOR) MISC    CHECK GLUCOSE 4 TIMES DAILY    EPINEPHRINE (EPIPEN 2-OSMAR) 0.3 MG/0.3ML SOAJ INJECTION    Inject 0.3 mLs into the muscle once for 1 dose.  Use as directed for allergic reaction    ESCITALOPRAM (LEXAPRO) 10 MG TABLET    TAKE ONE-HALF TABLET BY MOUTH DAILY    GLIMEPIRIDE (AMARYL) 4 MG TABLET    TAKE ONE TABLET BY MOUTH EVERY MORNING BEFORE BREAKFAST    GLUCOSE MONITORING KIT (FREESTYLE) MONITORING KIT    Use a ONE TOUCH    GLUCOSE MONITORING KIT (FREESTYLE) MONITORING KIT    1 kit by Does not apply route daily    GLUCOSE MONITORING KIT (FREESTYLE) MONITORING KIT    1 kit by Does not apply route daily    INSULIN NPH (HUMULIN N) 100 UNIT/ML INJECTION VIAL    45 units in the am and 25 units HS    INSULIN REGULAR (HUMULIN R) 100 UNIT/ML INJECTION

## 2019-08-14 NOTE — ED NOTES
Patient prepared for and ready to be discharged. Patient discharged at this time in no acute distress after verbalizing understanding of discharge instructions. Patient left after receiving After Visit Summary instructions.       Víctor Castro RN  08/14/19 [id number]

## 2019-08-22 ENCOUNTER — OFFICE VISIT (OUTPATIENT)
Dept: PRIMARY CARE CLINIC | Age: 74
End: 2019-08-22
Payer: MEDICARE

## 2019-08-22 VITALS
DIASTOLIC BLOOD PRESSURE: 80 MMHG | WEIGHT: 272 LBS | BODY MASS INDEX: 43.9 KG/M2 | HEART RATE: 82 BPM | TEMPERATURE: 98.5 F | SYSTOLIC BLOOD PRESSURE: 130 MMHG

## 2019-08-22 DIAGNOSIS — Z48.02 ENCOUNTER FOR REMOVAL OF SUTURES: ICD-10-CM

## 2019-08-22 DIAGNOSIS — E78.5 HYPERLIPIDEMIA, UNSPECIFIED HYPERLIPIDEMIA TYPE: ICD-10-CM

## 2019-08-22 DIAGNOSIS — R55 SYNCOPE, UNSPECIFIED SYNCOPE TYPE: Primary | ICD-10-CM

## 2019-08-22 DIAGNOSIS — I10 ESSENTIAL HYPERTENSION: ICD-10-CM

## 2019-08-22 PROCEDURE — 1036F TOBACCO NON-USER: CPT | Performed by: INTERNAL MEDICINE

## 2019-08-22 PROCEDURE — 3017F COLORECTAL CA SCREEN DOC REV: CPT | Performed by: INTERNAL MEDICINE

## 2019-08-22 PROCEDURE — G8427 DOCREV CUR MEDS BY ELIG CLIN: HCPCS | Performed by: INTERNAL MEDICINE

## 2019-08-22 PROCEDURE — G8417 CALC BMI ABV UP PARAM F/U: HCPCS | Performed by: INTERNAL MEDICINE

## 2019-08-22 PROCEDURE — 4040F PNEUMOC VAC/ADMIN/RCVD: CPT | Performed by: INTERNAL MEDICINE

## 2019-08-22 PROCEDURE — 2022F DILAT RTA XM EVC RTNOPTHY: CPT | Performed by: INTERNAL MEDICINE

## 2019-08-22 PROCEDURE — 3045F PR MOST RECENT HEMOGLOBIN A1C LEVEL 7.0-9.0%: CPT | Performed by: INTERNAL MEDICINE

## 2019-08-22 PROCEDURE — 1123F ACP DISCUSS/DSCN MKR DOCD: CPT | Performed by: INTERNAL MEDICINE

## 2019-08-22 PROCEDURE — 99215 OFFICE O/P EST HI 40 MIN: CPT | Performed by: INTERNAL MEDICINE

## 2019-08-22 PROCEDURE — 1090F PRES/ABSN URINE INCON ASSESS: CPT | Performed by: INTERNAL MEDICINE

## 2019-08-22 PROCEDURE — G8399 PT W/DXA RESULTS DOCUMENT: HCPCS | Performed by: INTERNAL MEDICINE

## 2019-08-22 RX ORDER — BLOOD PRESSURE TEST KIT
1 KIT MISCELLANEOUS 2 TIMES DAILY
Qty: 1 KIT | Refills: 0 | Status: SHIPPED | OUTPATIENT
Start: 2019-08-22

## 2019-08-22 NOTE — PROGRESS NOTES
Ricky Schneider  YOB: 1945    Date of Service:  8/22/2019    Chief Complaint:   Ricky Schneider is a 76 y.o. female who presents for ER follow  And other concerns      HPI: ER f/u on syncope. Patient had ekg and blood work was done. Stiches were placed on nose due to injury from fall. Diabetes: not controlled . Patient is now  Monitoring sugar at home,  patient is on insulin and glimepiride. Discussed for low carb diet and good impact of exercise on health.         Hypertension : stable patient is on labetalol and  chlorthalidone . Patient denies any side effects of medication. Patient is monitoring  bp at home. .  Patient is not doing exercise .        Obesity: patient says she started exercise.      Hyperlipidemia : stable .        Review of Systems:  Constitutional: Negative for chills, fever, malaise/fatigue and weight loss. HENT: Negative for headaches, ear discharge, ear pain, hearing loss, sore throat, blurry vision and double vision. bruise under eye. Respiratory: Negative for cough,  sputum production, hemoptysis, shortness of breath and wheezing. Cardiovascular: Negative for chest pain, palpitations, orthopnea, claudication and leg swelling. Gastrointestinal: Negative for abdominal pain, nausea and vomiting, constipation, diarrhea, heartburn, blood in stool, melena. Genitourinary: Negative for dysuria, flank pain, frequency, hematuria and urgency. Musculoskeletal: Negative for back pain, myalgias and neck pain. Knee pain. Shoulder pain. Neurological: Negative for dizziness, seizure, sensory change, focal weakness, loss of consciousness . Psychiatric/Behavioral: Negative for depression and substance abuse. The patient does not have insomnia. Skin: Rash, skin lesion, itching, acne, numerous moles. Stiches on nose.     Vitals:    08/22/19 1554   BP: 130/80   Pulse: 82   Temp: 98.5 °F (36.9 °C)     Wt Readings from Last 3 Encounters:   08/22/19 272 lb quit: 1980     Years since quittin.6    Smokeless tobacco: Never Used   Substance and Sexual Activity    Alcohol use: Yes    Drug use: Not on file    Sexual activity: Not on file   Lifestyle    Physical activity:     Days per week: Not on file     Minutes per session: Not on file    Stress: Not on file   Relationships    Social connections:     Talks on phone: Not on file     Gets together: Not on file     Attends Anabaptist service: Not on file     Active member of club or organization: Not on file     Attends meetings of clubs or organizations: Not on file     Relationship status: Not on file    Intimate partner violence:     Fear of current or ex partner: Not on file     Emotionally abused: Not on file     Physically abused: Not on file     Forced sexual activity: Not on file   Other Topics Concern    Not on file   Social History Narrative    Not on file       Assessment/Plan:    1. Syncope, unspecified syncope type  Patient needs cardiology evaluation. EKG reviewed    - Saranya Regalado MD, Cardiology, [facility]      Uncontrolled type 2 diabetes mellitus with hyperglycemia ([facility] [address]  a1c 8.8 improved from previous  Insulin  And glimepiride. Patient is  checking sugar at home now . But still patient is  not  Taking her medications properly . Patient verbalize understanding of bad impact of not controlled DM. Again Patient declined to see endocrinologist.   Monitor blood sugar at home, new glucometer ordered. Regular exercise and low carb diet.        Mixed hyperlipidemia  stable  zocor      Essential hypertension  stable  labetalol and  Chlorthalidone  Monitor blood pressure regularly  Regular exercise and low salt diet      due to noncompliance care coordinator discharge patient.        Patient again discussed compliance of medication .

## 2019-10-21 RX ORDER — INSULIN HUMAN 100 [IU]/ML
INJECTION, SUSPENSION SUBCUTANEOUS
Refills: 2 | OUTPATIENT
Start: 2019-10-21

## 2019-10-22 ENCOUNTER — OFFICE VISIT (OUTPATIENT)
Dept: PRIMARY CARE CLINIC | Age: 74
End: 2019-10-22
Payer: MEDICARE

## 2019-10-22 VITALS
HEIGHT: 64 IN | BODY MASS INDEX: 46.03 KG/M2 | TEMPERATURE: 98.4 F | DIASTOLIC BLOOD PRESSURE: 60 MMHG | HEART RATE: 78 BPM | WEIGHT: 269.6 LBS | SYSTOLIC BLOOD PRESSURE: 132 MMHG

## 2019-10-22 VITALS
HEART RATE: 70 BPM | WEIGHT: 269 LBS | HEIGHT: 64 IN | DIASTOLIC BLOOD PRESSURE: 60 MMHG | SYSTOLIC BLOOD PRESSURE: 132 MMHG | TEMPERATURE: 98.4 F | BODY MASS INDEX: 45.93 KG/M2

## 2019-10-22 DIAGNOSIS — I10 ESSENTIAL HYPERTENSION: ICD-10-CM

## 2019-10-22 DIAGNOSIS — Z76.89 ENCOUNTER TO ESTABLISH CARE: Primary | ICD-10-CM

## 2019-10-22 DIAGNOSIS — Z00.00 ROUTINE GENERAL MEDICAL EXAMINATION AT A HEALTH CARE FACILITY: Primary | ICD-10-CM

## 2019-10-22 DIAGNOSIS — E78.5 HYPERLIPIDEMIA, UNSPECIFIED HYPERLIPIDEMIA TYPE: ICD-10-CM

## 2019-10-22 DIAGNOSIS — E66.01 CLASS 3 SEVERE OBESITY DUE TO EXCESS CALORIES WITHOUT SERIOUS COMORBIDITY WITH BODY MASS INDEX (BMI) OF 45.0 TO 49.9 IN ADULT (HCC): ICD-10-CM

## 2019-10-22 DIAGNOSIS — I87.2 VENOUS STASIS DERMATITIS OF BOTH LOWER EXTREMITIES: ICD-10-CM

## 2019-10-22 DIAGNOSIS — Z12.11 COLON CANCER SCREENING: ICD-10-CM

## 2019-10-22 DIAGNOSIS — Z23 NEED FOR IMMUNIZATION AGAINST INFLUENZA: ICD-10-CM

## 2019-10-22 LAB
A/G RATIO: 1.3 (ref 1.1–2.2)
ALBUMIN SERPL-MCNC: 4 G/DL (ref 3.4–5)
ALP BLD-CCNC: 91 U/L (ref 40–129)
ALT SERPL-CCNC: 13 U/L (ref 10–40)
ANION GAP SERPL CALCULATED.3IONS-SCNC: 15 MMOL/L (ref 3–16)
AST SERPL-CCNC: 15 U/L (ref 15–37)
BILIRUB SERPL-MCNC: 0.3 MG/DL (ref 0–1)
BUN BLDV-MCNC: 19 MG/DL (ref 7–20)
CALCIUM SERPL-MCNC: 9.3 MG/DL (ref 8.3–10.6)
CHLORIDE BLD-SCNC: 94 MMOL/L (ref 99–110)
CHOLESTEROL, TOTAL: 167 MG/DL (ref 0–199)
CO2: 27 MMOL/L (ref 21–32)
CREAT SERPL-MCNC: 1 MG/DL (ref 0.6–1.2)
CREATININE URINE: 219.5 MG/DL (ref 28–259)
GFR AFRICAN AMERICAN: >60
GFR NON-AFRICAN AMERICAN: 54
GLOBULIN: 3.2 G/DL
GLUCOSE BLD-MCNC: 233 MG/DL (ref 70–99)
HBA1C MFR BLD: 9.4 %
HDLC SERPL-MCNC: 53 MG/DL (ref 40–60)
LDL CHOLESTEROL CALCULATED: 84 MG/DL
MICROALBUMIN UR-MCNC: <1.2 MG/DL
MICROALBUMIN/CREAT UR-RTO: NORMAL MG/G (ref 0–30)
POTASSIUM SERPL-SCNC: 4.1 MMOL/L (ref 3.5–5.1)
SODIUM BLD-SCNC: 136 MMOL/L (ref 136–145)
TOTAL PROTEIN: 7.2 G/DL (ref 6.4–8.2)
TRIGL SERPL-MCNC: 149 MG/DL (ref 0–150)
VLDLC SERPL CALC-MCNC: 30 MG/DL

## 2019-10-22 PROCEDURE — G0008 ADMIN INFLUENZA VIRUS VAC: HCPCS | Performed by: FAMILY MEDICINE

## 2019-10-22 PROCEDURE — 99205 OFFICE O/P NEW HI 60 MIN: CPT | Performed by: FAMILY MEDICINE

## 2019-10-22 PROCEDURE — G0439 PPPS, SUBSEQ VISIT: HCPCS | Performed by: FAMILY MEDICINE

## 2019-10-22 PROCEDURE — 83036 HEMOGLOBIN GLYCOSYLATED A1C: CPT | Performed by: FAMILY MEDICINE

## 2019-10-22 PROCEDURE — 90653 IIV ADJUVANT VACCINE IM: CPT | Performed by: FAMILY MEDICINE

## 2019-10-22 RX ORDER — LANCETS 33 GAUGE
EACH MISCELLANEOUS
Qty: 300 EACH | Refills: 0 | Status: SHIPPED | OUTPATIENT
Start: 2019-10-22

## 2019-10-22 RX ORDER — BLOOD SUGAR DIAGNOSTIC
STRIP MISCELLANEOUS
Qty: 100 EACH | Refills: 5 | Status: SHIPPED | OUTPATIENT
Start: 2019-10-22

## 2019-10-22 ASSESSMENT — PATIENT HEALTH QUESTIONNAIRE - PHQ9
SUM OF ALL RESPONSES TO PHQ QUESTIONS 1-9: 0
SUM OF ALL RESPONSES TO PHQ QUESTIONS 1-9: 0
SUM OF ALL RESPONSES TO PHQ QUESTIONS 1-9: 1
1. LITTLE INTEREST OR PLEASURE IN DOING THINGS: 0
SUM OF ALL RESPONSES TO PHQ9 QUESTIONS 1 & 2: 1
SUM OF ALL RESPONSES TO PHQ QUESTIONS 1-9: 1
2. FEELING DOWN, DEPRESSED OR HOPELESS: 1

## 2019-10-22 ASSESSMENT — LIFESTYLE VARIABLES: HOW OFTEN DO YOU HAVE A DRINK CONTAINING ALCOHOL: 0

## 2019-10-23 ASSESSMENT — ENCOUNTER SYMPTOMS
SINUS PAIN: 0
ABDOMINAL PAIN: 0
SHORTNESS OF BREATH: 0
EYE PAIN: 0
NAUSEA: 0
SINUS PRESSURE: 0
SORE THROAT: 0
COUGH: 0
DIARRHEA: 0
CONSTIPATION: 0
VOMITING: 0
BACK PAIN: 1

## 2019-11-05 RX ORDER — LABETALOL 100 MG/1
TABLET, FILM COATED ORAL
Qty: 180 TABLET | Refills: 0 | OUTPATIENT
Start: 2019-11-05

## 2019-11-05 RX ORDER — GLIMEPIRIDE 4 MG/1
TABLET ORAL
Qty: 90 TABLET | Refills: 0 | OUTPATIENT
Start: 2019-11-05

## 2019-11-05 RX ORDER — CHLORTHALIDONE 25 MG/1
TABLET ORAL
Qty: 90 TABLET | Refills: 0 | OUTPATIENT
Start: 2019-11-05

## 2019-11-19 RX ORDER — BUDESONIDE AND FORMOTEROL FUMARATE DIHYDRATE 160; 4.5 UG/1; UG/1
2 AEROSOL RESPIRATORY (INHALATION) 2 TIMES DAILY
Qty: 3 INHALER | Refills: 1 | Status: SHIPPED | OUTPATIENT
Start: 2019-11-19 | End: 2020-10-28

## 2019-11-20 RX ORDER — GLIMEPIRIDE 4 MG/1
TABLET ORAL
Qty: 90 TABLET | Refills: 0 | OUTPATIENT
Start: 2019-11-20

## 2019-11-30 ENCOUNTER — APPOINTMENT (OUTPATIENT)
Dept: GENERAL RADIOLOGY | Age: 74
DRG: 189 | End: 2019-11-30
Payer: MEDICARE

## 2019-11-30 ENCOUNTER — HOSPITAL ENCOUNTER (INPATIENT)
Age: 74
LOS: 4 days | Discharge: HOME | DRG: 189 | End: 2019-12-04
Attending: EMERGENCY MEDICINE | Admitting: INTERNAL MEDICINE
Payer: MEDICARE

## 2019-11-30 ENCOUNTER — APPOINTMENT (OUTPATIENT)
Dept: CT IMAGING | Age: 74
DRG: 189 | End: 2019-11-30
Payer: MEDICARE

## 2019-11-30 DIAGNOSIS — R06.09 DYSPNEA ON EXERTION: Primary | ICD-10-CM

## 2019-11-30 DIAGNOSIS — S81.801A LEG WOUND, RIGHT, INITIAL ENCOUNTER: ICD-10-CM

## 2019-11-30 DIAGNOSIS — J96.01 ACUTE RESPIRATORY FAILURE WITH HYPOXIA (HCC): ICD-10-CM

## 2019-11-30 DIAGNOSIS — R77.8 ELEVATED TROPONIN: ICD-10-CM

## 2019-11-30 PROBLEM — R09.02 HYPOXIA: Status: ACTIVE | Noted: 2019-11-30

## 2019-11-30 LAB
A/G RATIO: 1.3 (ref 1.1–2.2)
ALBUMIN SERPL-MCNC: 3.8 G/DL (ref 3.4–5)
ALP BLD-CCNC: 169 U/L (ref 40–129)
ALT SERPL-CCNC: 50 U/L (ref 10–40)
ANION GAP SERPL CALCULATED.3IONS-SCNC: 20 MMOL/L (ref 3–16)
AST SERPL-CCNC: 57 U/L (ref 15–37)
BACTERIA: ABNORMAL /HPF
BASOPHILS ABSOLUTE: 0 K/UL (ref 0–0.2)
BASOPHILS RELATIVE PERCENT: 0.4 %
BILIRUB SERPL-MCNC: 0.4 MG/DL (ref 0–1)
BILIRUBIN URINE: NEGATIVE
BLOOD, URINE: ABNORMAL
BUN BLDV-MCNC: 20 MG/DL (ref 7–20)
CALCIUM SERPL-MCNC: 9.5 MG/DL (ref 8.3–10.6)
CHLORIDE BLD-SCNC: 98 MMOL/L (ref 99–110)
CLARITY: ABNORMAL
CO2: 22 MMOL/L (ref 21–32)
COLOR: YELLOW
CREAT SERPL-MCNC: 1.1 MG/DL (ref 0.6–1.2)
EOSINOPHILS ABSOLUTE: 0 K/UL (ref 0–0.6)
EOSINOPHILS RELATIVE PERCENT: 0.4 %
EPITHELIAL CELLS, UA: ABNORMAL /HPF
GFR AFRICAN AMERICAN: 59
GFR NON-AFRICAN AMERICAN: 49
GLOBULIN: 2.9 G/DL
GLUCOSE BLD-MCNC: 170 MG/DL (ref 70–99)
GLUCOSE BLD-MCNC: 215 MG/DL (ref 70–99)
GLUCOSE URINE: 100 MG/DL
HCT VFR BLD CALC: 40.3 % (ref 36–48)
HEMOGLOBIN: 13 G/DL (ref 12–16)
KETONES, URINE: NEGATIVE MG/DL
LEUKOCYTE ESTERASE, URINE: ABNORMAL
LYMPHOCYTES ABSOLUTE: 1.5 K/UL (ref 1–5.1)
LYMPHOCYTES RELATIVE PERCENT: 14.8 %
MCH RBC QN AUTO: 27.9 PG (ref 26–34)
MCHC RBC AUTO-ENTMCNC: 32.3 G/DL (ref 31–36)
MCV RBC AUTO: 86.2 FL (ref 80–100)
MICROSCOPIC EXAMINATION: YES
MONOCYTES ABSOLUTE: 0.6 K/UL (ref 0–1.3)
MONOCYTES RELATIVE PERCENT: 5.9 %
MUCUS: ABNORMAL /LPF
NEUTROPHILS ABSOLUTE: 7.8 K/UL (ref 1.7–7.7)
NEUTROPHILS RELATIVE PERCENT: 78.5 %
NITRITE, URINE: NEGATIVE
PDW BLD-RTO: 17.6 % (ref 12.4–15.4)
PERFORMED ON: ABNORMAL
PH UA: 6 (ref 5–8)
PLATELET # BLD: 223 K/UL (ref 135–450)
PMV BLD AUTO: 9.1 FL (ref 5–10.5)
POTASSIUM REFLEX MAGNESIUM: 3.8 MMOL/L (ref 3.5–5.1)
PRO-BNP: 3364 PG/ML (ref 0–449)
PROTEIN UA: ABNORMAL MG/DL
RBC # BLD: 4.68 M/UL (ref 4–5.2)
RBC UA: ABNORMAL /HPF (ref 0–2)
RENAL EPITHELIAL, UA: ABNORMAL /HPF
SODIUM BLD-SCNC: 140 MMOL/L (ref 136–145)
SPECIFIC GRAVITY UA: 1.02 (ref 1–1.03)
TOTAL PROTEIN: 6.7 G/DL (ref 6.4–8.2)
TROPONIN: 0.01 NG/ML
TROPONIN: 0.02 NG/ML
URINE TYPE: ABNORMAL
UROBILINOGEN, URINE: 0.2 E.U./DL
WBC # BLD: 9.9 K/UL (ref 4–11)
WBC UA: ABNORMAL /HPF (ref 0–5)

## 2019-11-30 PROCEDURE — 6370000000 HC RX 637 (ALT 250 FOR IP): Performed by: EMERGENCY MEDICINE

## 2019-11-30 PROCEDURE — 2060000000 HC ICU INTERMEDIATE R&B

## 2019-11-30 PROCEDURE — 71046 X-RAY EXAM CHEST 2 VIEWS: CPT

## 2019-11-30 PROCEDURE — 83036 HEMOGLOBIN GLYCOSYLATED A1C: CPT

## 2019-11-30 PROCEDURE — 85025 COMPLETE CBC W/AUTO DIFF WBC: CPT

## 2019-11-30 PROCEDURE — 80053 COMPREHEN METABOLIC PANEL: CPT

## 2019-11-30 PROCEDURE — 2700000000 HC OXYGEN THERAPY PER DAY

## 2019-11-30 PROCEDURE — 84484 ASSAY OF TROPONIN QUANT: CPT

## 2019-11-30 PROCEDURE — 83880 ASSAY OF NATRIURETIC PEPTIDE: CPT

## 2019-11-30 PROCEDURE — 36415 COLL VENOUS BLD VENIPUNCTURE: CPT

## 2019-11-30 PROCEDURE — 81001 URINALYSIS AUTO W/SCOPE: CPT

## 2019-11-30 PROCEDURE — 93005 ELECTROCARDIOGRAM TRACING: CPT | Performed by: EMERGENCY MEDICINE

## 2019-11-30 PROCEDURE — 71250 CT THORAX DX C-: CPT

## 2019-11-30 PROCEDURE — 99291 CRITICAL CARE FIRST HOUR: CPT

## 2019-11-30 PROCEDURE — 73590 X-RAY EXAM OF LOWER LEG: CPT

## 2019-11-30 RX ORDER — DEXTROSE MONOHYDRATE 50 MG/ML
100 INJECTION, SOLUTION INTRAVENOUS PRN
Status: DISCONTINUED | OUTPATIENT
Start: 2019-11-30 | End: 2019-12-04 | Stop reason: HOSPADM

## 2019-11-30 RX ORDER — SODIUM CHLORIDE 0.9 % (FLUSH) 0.9 %
10 SYRINGE (ML) INJECTION PRN
Status: DISCONTINUED | OUTPATIENT
Start: 2019-11-30 | End: 2019-12-04 | Stop reason: HOSPADM

## 2019-11-30 RX ORDER — SODIUM CHLORIDE 0.9 % (FLUSH) 0.9 %
10 SYRINGE (ML) INJECTION EVERY 12 HOURS SCHEDULED
Status: DISCONTINUED | OUTPATIENT
Start: 2019-11-30 | End: 2019-12-04 | Stop reason: HOSPADM

## 2019-11-30 RX ORDER — NICOTINE POLACRILEX 4 MG
15 LOZENGE BUCCAL PRN
Status: DISCONTINUED | OUTPATIENT
Start: 2019-11-30 | End: 2019-12-04 | Stop reason: HOSPADM

## 2019-11-30 RX ORDER — ONDANSETRON 2 MG/ML
4 INJECTION INTRAMUSCULAR; INTRAVENOUS EVERY 6 HOURS PRN
Status: DISCONTINUED | OUTPATIENT
Start: 2019-11-30 | End: 2019-12-04 | Stop reason: HOSPADM

## 2019-11-30 RX ORDER — BUDESONIDE 0.5 MG/2ML
0.5 INHALANT ORAL 2 TIMES DAILY
Status: DISCONTINUED | OUTPATIENT
Start: 2019-11-30 | End: 2019-12-04 | Stop reason: HOSPADM

## 2019-11-30 RX ORDER — SODIUM CHLORIDE 0.9 % (FLUSH) 0.9 %
10 SYRINGE (ML) INJECTION EVERY 12 HOURS SCHEDULED
Status: DISCONTINUED | OUTPATIENT
Start: 2019-11-30 | End: 2019-12-02 | Stop reason: SDUPTHER

## 2019-11-30 RX ORDER — FORMOTEROL FUMARATE 20 UG/2ML
20 SOLUTION RESPIRATORY (INHALATION) 2 TIMES DAILY
Status: DISCONTINUED | OUTPATIENT
Start: 2019-11-30 | End: 2019-12-04 | Stop reason: HOSPADM

## 2019-11-30 RX ORDER — ACETAMINOPHEN 325 MG/1
650 TABLET ORAL EVERY 4 HOURS PRN
Status: DISCONTINUED | OUTPATIENT
Start: 2019-11-30 | End: 2019-11-30

## 2019-11-30 RX ORDER — SIMVASTATIN 20 MG
20 TABLET ORAL NIGHTLY
Status: DISCONTINUED | OUTPATIENT
Start: 2019-12-01 | End: 2019-12-04 | Stop reason: HOSPADM

## 2019-11-30 RX ORDER — SODIUM CHLORIDE 0.9 % (FLUSH) 0.9 %
10 SYRINGE (ML) INJECTION PRN
Status: DISCONTINUED | OUTPATIENT
Start: 2019-11-30 | End: 2019-12-02 | Stop reason: SDUPTHER

## 2019-11-30 RX ORDER — ASPIRIN 81 MG/1
324 TABLET, CHEWABLE ORAL ONCE
Status: COMPLETED | OUTPATIENT
Start: 2019-11-30 | End: 2019-11-30

## 2019-11-30 RX ORDER — DEXTROSE MONOHYDRATE 25 G/50ML
12.5 INJECTION, SOLUTION INTRAVENOUS PRN
Status: DISCONTINUED | OUTPATIENT
Start: 2019-11-30 | End: 2019-12-04 | Stop reason: HOSPADM

## 2019-11-30 RX ORDER — GLIMEPIRIDE 4 MG/1
4 TABLET ORAL
Status: DISCONTINUED | OUTPATIENT
Start: 2019-12-01 | End: 2019-12-01

## 2019-11-30 RX ADMIN — ASPIRIN 81 MG 324 MG: 81 TABLET ORAL at 17:55

## 2019-11-30 ASSESSMENT — ENCOUNTER SYMPTOMS
CHEST TIGHTNESS: 1
VOMITING: 0
NAUSEA: 0
ABDOMINAL PAIN: 0
EYE DISCHARGE: 0
COLOR CHANGE: 1
SHORTNESS OF BREATH: 1
COUGH: 0

## 2019-12-01 LAB
ANION GAP SERPL CALCULATED.3IONS-SCNC: 12 MMOL/L (ref 3–16)
BASOPHILS ABSOLUTE: 0.1 K/UL (ref 0–0.2)
BASOPHILS RELATIVE PERCENT: 0.8 %
BUN BLDV-MCNC: 24 MG/DL (ref 7–20)
CALCIUM SERPL-MCNC: 8.5 MG/DL (ref 8.3–10.6)
CHLORIDE BLD-SCNC: 103 MMOL/L (ref 99–110)
CO2: 26 MMOL/L (ref 21–32)
CREAT SERPL-MCNC: 1.2 MG/DL (ref 0.6–1.2)
D DIMER: 1023 NG/ML DDU (ref 0–229)
EKG ATRIAL RATE: 87 BPM
EKG DIAGNOSIS: NORMAL
EKG P AXIS: 55 DEGREES
EKG P-R INTERVAL: 144 MS
EKG Q-T INTERVAL: 408 MS
EKG QRS DURATION: 128 MS
EKG QTC CALCULATION (BAZETT): 490 MS
EKG R AXIS: -56 DEGREES
EKG T AXIS: 126 DEGREES
EKG VENTRICULAR RATE: 87 BPM
EOSINOPHILS ABSOLUTE: 0 K/UL (ref 0–0.6)
EOSINOPHILS RELATIVE PERCENT: 0.4 %
ESTIMATED AVERAGE GLUCOSE: 240.3 MG/DL
GFR AFRICAN AMERICAN: 53
GFR NON-AFRICAN AMERICAN: 44
GLUCOSE BLD-MCNC: 113 MG/DL (ref 70–99)
GLUCOSE BLD-MCNC: 144 MG/DL (ref 70–99)
GLUCOSE BLD-MCNC: 147 MG/DL (ref 70–99)
GLUCOSE BLD-MCNC: 149 MG/DL (ref 70–99)
GLUCOSE BLD-MCNC: 173 MG/DL (ref 70–99)
HBA1C MFR BLD: 10 %
HCT VFR BLD CALC: 37.4 % (ref 36–48)
HEMOGLOBIN: 11.8 G/DL (ref 12–16)
LYMPHOCYTES ABSOLUTE: 1.4 K/UL (ref 1–5.1)
LYMPHOCYTES RELATIVE PERCENT: 17.1 %
MCH RBC QN AUTO: 27.8 PG (ref 26–34)
MCHC RBC AUTO-ENTMCNC: 31.6 G/DL (ref 31–36)
MCV RBC AUTO: 87.8 FL (ref 80–100)
MONOCYTES ABSOLUTE: 0.8 K/UL (ref 0–1.3)
MONOCYTES RELATIVE PERCENT: 9.3 %
NEUTROPHILS ABSOLUTE: 5.9 K/UL (ref 1.7–7.7)
NEUTROPHILS RELATIVE PERCENT: 72.4 %
PDW BLD-RTO: 17.2 % (ref 12.4–15.4)
PERFORMED ON: ABNORMAL
PLATELET # BLD: 205 K/UL (ref 135–450)
PMV BLD AUTO: 8.7 FL (ref 5–10.5)
POTASSIUM REFLEX MAGNESIUM: 4 MMOL/L (ref 3.5–5.1)
RBC # BLD: 4.26 M/UL (ref 4–5.2)
SODIUM BLD-SCNC: 141 MMOL/L (ref 136–145)
WBC # BLD: 8.1 K/UL (ref 4–11)

## 2019-12-01 PROCEDURE — 6370000000 HC RX 637 (ALT 250 FOR IP): Performed by: STUDENT IN AN ORGANIZED HEALTH CARE EDUCATION/TRAINING PROGRAM

## 2019-12-01 PROCEDURE — 85025 COMPLETE CBC W/AUTO DIFF WBC: CPT

## 2019-12-01 PROCEDURE — 80048 BASIC METABOLIC PNL TOTAL CA: CPT

## 2019-12-01 PROCEDURE — 87070 CULTURE OTHR SPECIMN AEROBIC: CPT

## 2019-12-01 PROCEDURE — 93010 ELECTROCARDIOGRAM REPORT: CPT | Performed by: INTERNAL MEDICINE

## 2019-12-01 PROCEDURE — 94640 AIRWAY INHALATION TREATMENT: CPT

## 2019-12-01 PROCEDURE — 84134 ASSAY OF PREALBUMIN: CPT

## 2019-12-01 PROCEDURE — 87186 SC STD MICRODIL/AGAR DIL: CPT

## 2019-12-01 PROCEDURE — 85379 FIBRIN DEGRADATION QUANT: CPT

## 2019-12-01 PROCEDURE — 2580000003 HC RX 258: Performed by: INTERNAL MEDICINE

## 2019-12-01 PROCEDURE — 6370000000 HC RX 637 (ALT 250 FOR IP): Performed by: INTERNAL MEDICINE

## 2019-12-01 PROCEDURE — 2700000000 HC OXYGEN THERAPY PER DAY

## 2019-12-01 PROCEDURE — 99222 1ST HOSP IP/OBS MODERATE 55: CPT | Performed by: SURGERY

## 2019-12-01 PROCEDURE — 36415 COLL VENOUS BLD VENIPUNCTURE: CPT

## 2019-12-01 PROCEDURE — 6360000002 HC RX W HCPCS: Performed by: INTERNAL MEDICINE

## 2019-12-01 PROCEDURE — 2060000000 HC ICU INTERMEDIATE R&B

## 2019-12-01 PROCEDURE — 87077 CULTURE AEROBIC IDENTIFY: CPT

## 2019-12-01 PROCEDURE — 87205 SMEAR GRAM STAIN: CPT

## 2019-12-01 PROCEDURE — 99223 1ST HOSP IP/OBS HIGH 75: CPT | Performed by: INTERNAL MEDICINE

## 2019-12-01 PROCEDURE — 94761 N-INVAS EAR/PLS OXIMETRY MLT: CPT

## 2019-12-01 RX ORDER — AMMONIUM LACTATE 12 G/100G
LOTION TOPICAL PRN
Status: DISCONTINUED | OUTPATIENT
Start: 2019-12-01 | End: 2019-12-04 | Stop reason: HOSPADM

## 2019-12-01 RX ORDER — DOXYCYCLINE 100 MG/1
100 CAPSULE ORAL EVERY 12 HOURS SCHEDULED
Status: DISCONTINUED | OUTPATIENT
Start: 2019-12-01 | End: 2019-12-03

## 2019-12-01 RX ORDER — LANOLIN ALCOHOL/MO/W.PET/CERES
CREAM (GRAM) TOPICAL 2 TIMES DAILY
Status: DISCONTINUED | OUTPATIENT
Start: 2019-12-01 | End: 2019-12-04 | Stop reason: HOSPADM

## 2019-12-01 RX ADMIN — PIPERACILLIN AND TAZOBACTAM 3.38 G: 3; .375 INJECTION, POWDER, LYOPHILIZED, FOR SOLUTION INTRAVENOUS at 08:39

## 2019-12-01 RX ADMIN — Medication 10 ML: at 21:35

## 2019-12-01 RX ADMIN — DOXYCYCLINE 100 MG: 100 CAPSULE ORAL at 21:34

## 2019-12-01 RX ADMIN — Medication 10 ML: at 08:45

## 2019-12-01 RX ADMIN — DOXYCYCLINE 100 MG: 100 CAPSULE ORAL at 11:59

## 2019-12-01 RX ADMIN — Medication: at 11:50

## 2019-12-01 RX ADMIN — PIPERACILLIN AND TAZOBACTAM 3.38 G: 3; .375 INJECTION, POWDER, LYOPHILIZED, FOR SOLUTION INTRAVENOUS at 00:42

## 2019-12-01 RX ADMIN — ENOXAPARIN SODIUM 40 MG: 40 INJECTION SUBCUTANEOUS at 08:41

## 2019-12-01 RX ADMIN — MUPIROCIN: 20 OINTMENT TOPICAL at 11:50

## 2019-12-01 RX ADMIN — INSULIN HUMAN 25 UNITS: 100 INJECTION, SUSPENSION SUBCUTANEOUS at 01:00

## 2019-12-01 RX ADMIN — FORMOTEROL FUMARATE DIHYDRATE 20 MCG: 20 SOLUTION RESPIRATORY (INHALATION) at 23:00

## 2019-12-01 RX ADMIN — MUPIROCIN: 20 OINTMENT TOPICAL at 21:34

## 2019-12-01 RX ADMIN — BUDESONIDE 500 MCG: 0.5 SUSPENSION RESPIRATORY (INHALATION) at 23:00

## 2019-12-01 RX ADMIN — INSULIN HUMAN 45 UNITS: 100 INJECTION, SUSPENSION SUBCUTANEOUS at 08:43

## 2019-12-01 RX ADMIN — INSULIN HUMAN 25 UNITS: 100 INJECTION, SUSPENSION SUBCUTANEOUS at 21:35

## 2019-12-01 RX ADMIN — Medication 10 ML: at 00:42

## 2019-12-01 RX ADMIN — SIMVASTATIN 20 MG: 20 TABLET, FILM COATED ORAL at 21:34

## 2019-12-01 RX ADMIN — GLIMEPIRIDE 4 MG: 4 TABLET ORAL at 08:41

## 2019-12-01 ASSESSMENT — PAIN SCALES - GENERAL
PAINLEVEL_OUTOF10: 0

## 2019-12-02 ENCOUNTER — APPOINTMENT (OUTPATIENT)
Dept: NUCLEAR MEDICINE | Age: 74
DRG: 189 | End: 2019-12-02
Payer: MEDICARE

## 2019-12-02 ENCOUNTER — TELEPHONE (OUTPATIENT)
Dept: PRIMARY CARE CLINIC | Age: 74
End: 2019-12-02

## 2019-12-02 ENCOUNTER — APPOINTMENT (OUTPATIENT)
Dept: GENERAL RADIOLOGY | Age: 74
DRG: 189 | End: 2019-12-02
Payer: MEDICARE

## 2019-12-02 ENCOUNTER — APPOINTMENT (OUTPATIENT)
Dept: VASCULAR LAB | Age: 74
DRG: 189 | End: 2019-12-02
Payer: MEDICARE

## 2019-12-02 LAB
ANION GAP SERPL CALCULATED.3IONS-SCNC: 14 MMOL/L (ref 3–16)
BUN BLDV-MCNC: 26 MG/DL (ref 7–20)
CALCIUM SERPL-MCNC: 8.4 MG/DL (ref 8.3–10.6)
CHLORIDE BLD-SCNC: 103 MMOL/L (ref 99–110)
CO2: 24 MMOL/L (ref 21–32)
CREAT SERPL-MCNC: 1.1 MG/DL (ref 0.6–1.2)
GFR AFRICAN AMERICAN: 59
GFR NON-AFRICAN AMERICAN: 49
GLUCOSE BLD-MCNC: 111 MG/DL (ref 70–99)
GLUCOSE BLD-MCNC: 119 MG/DL (ref 70–99)
GLUCOSE BLD-MCNC: 152 MG/DL (ref 70–99)
GLUCOSE BLD-MCNC: 157 MG/DL (ref 70–99)
GLUCOSE BLD-MCNC: 73 MG/DL (ref 70–99)
HCT VFR BLD CALC: 38.1 % (ref 36–48)
HEMOGLOBIN: 12.2 G/DL (ref 12–16)
MAGNESIUM: 2.1 MG/DL (ref 1.8–2.4)
MCH RBC QN AUTO: 27.3 PG (ref 26–34)
MCHC RBC AUTO-ENTMCNC: 31.9 G/DL (ref 31–36)
MCV RBC AUTO: 85.6 FL (ref 80–100)
PDW BLD-RTO: 17.3 % (ref 12.4–15.4)
PERFORMED ON: ABNORMAL
PLATELET # BLD: 204 K/UL (ref 135–450)
PMV BLD AUTO: 8.6 FL (ref 5–10.5)
POTASSIUM SERPL-SCNC: 3.7 MMOL/L (ref 3.5–5.1)
PREALBUMIN: 18.6 MG/DL (ref 20–40)
PRO-BNP: 2658 PG/ML (ref 0–449)
RBC # BLD: 4.45 M/UL (ref 4–5.2)
SODIUM BLD-SCNC: 141 MMOL/L (ref 136–145)
WBC # BLD: 6.7 K/UL (ref 4–11)

## 2019-12-02 PROCEDURE — 6370000000 HC RX 637 (ALT 250 FOR IP): Performed by: INTERNAL MEDICINE

## 2019-12-02 PROCEDURE — 88305 TISSUE EXAM BY PATHOLOGIST: CPT

## 2019-12-02 PROCEDURE — 36415 COLL VENOUS BLD VENIPUNCTURE: CPT

## 2019-12-02 PROCEDURE — 83880 ASSAY OF NATRIURETIC PEPTIDE: CPT

## 2019-12-02 PROCEDURE — 85027 COMPLETE CBC AUTOMATED: CPT

## 2019-12-02 PROCEDURE — 2700000000 HC OXYGEN THERAPY PER DAY

## 2019-12-02 PROCEDURE — 6360000002 HC RX W HCPCS: Performed by: INTERNAL MEDICINE

## 2019-12-02 PROCEDURE — 2060000000 HC ICU INTERMEDIATE R&B

## 2019-12-02 PROCEDURE — 83735 ASSAY OF MAGNESIUM: CPT

## 2019-12-02 PROCEDURE — 80048 BASIC METABOLIC PNL TOTAL CA: CPT

## 2019-12-02 PROCEDURE — 3430000000 HC RX DIAGNOSTIC RADIOPHARMACEUTICAL: Performed by: INTERNAL MEDICINE

## 2019-12-02 PROCEDURE — 2500000003 HC RX 250 WO HCPCS: Performed by: STUDENT IN AN ORGANIZED HEALTH CARE EDUCATION/TRAINING PROGRAM

## 2019-12-02 PROCEDURE — 93971 EXTREMITY STUDY: CPT

## 2019-12-02 PROCEDURE — 2580000003 HC RX 258: Performed by: INTERNAL MEDICINE

## 2019-12-02 PROCEDURE — 94761 N-INVAS EAR/PLS OXIMETRY MLT: CPT

## 2019-12-02 PROCEDURE — 71046 X-RAY EXAM CHEST 2 VIEWS: CPT

## 2019-12-02 PROCEDURE — 94640 AIRWAY INHALATION TREATMENT: CPT

## 2019-12-02 PROCEDURE — 2500000003 HC RX 250 WO HCPCS

## 2019-12-02 PROCEDURE — A9540 TC99M MAA: HCPCS | Performed by: INTERNAL MEDICINE

## 2019-12-02 PROCEDURE — A9558 XE133 XENON 10MCI: HCPCS | Performed by: INTERNAL MEDICINE

## 2019-12-02 PROCEDURE — 78582 LUNG VENTILAT&PERFUS IMAGING: CPT

## 2019-12-02 RX ORDER — LIDOCAINE HYDROCHLORIDE AND EPINEPHRINE 10; 10 MG/ML; UG/ML
INJECTION, SOLUTION INFILTRATION; PERINEURAL
Status: COMPLETED
Start: 2019-12-02 | End: 2019-12-02

## 2019-12-02 RX ORDER — ACETAMINOPHEN 325 MG/1
650 TABLET ORAL EVERY 4 HOURS PRN
Status: DISCONTINUED | OUTPATIENT
Start: 2019-12-02 | End: 2019-12-04 | Stop reason: HOSPADM

## 2019-12-02 RX ORDER — XENON XE-133 10 MCI/1
5.4 GAS RESPIRATORY (INHALATION)
Status: COMPLETED | OUTPATIENT
Start: 2019-12-02 | End: 2019-12-02

## 2019-12-02 RX ADMIN — LIDOCAINE HYDROCHLORIDE,EPINEPHRINE BITARTRATE: 10; .01 INJECTION, SOLUTION INFILTRATION; PERINEURAL at 17:43

## 2019-12-02 RX ADMIN — Medication: at 09:00

## 2019-12-02 RX ADMIN — DOXYCYCLINE 100 MG: 100 CAPSULE ORAL at 09:53

## 2019-12-02 RX ADMIN — FORMOTEROL FUMARATE DIHYDRATE 20 MCG: 20 SOLUTION RESPIRATORY (INHALATION) at 22:04

## 2019-12-02 RX ADMIN — ACETAMINOPHEN 650 MG: 325 TABLET ORAL at 20:55

## 2019-12-02 RX ADMIN — Medication 10 ML: at 09:00

## 2019-12-02 RX ADMIN — ENOXAPARIN SODIUM 120 MG: 120 INJECTION SUBCUTANEOUS at 10:22

## 2019-12-02 RX ADMIN — SIMVASTATIN 20 MG: 20 TABLET, FILM COATED ORAL at 20:55

## 2019-12-02 RX ADMIN — DOXYCYCLINE 100 MG: 100 CAPSULE ORAL at 20:55

## 2019-12-02 RX ADMIN — FORMOTEROL FUMARATE DIHYDRATE 20 MCG: 20 SOLUTION RESPIRATORY (INHALATION) at 09:58

## 2019-12-02 RX ADMIN — BUDESONIDE 500 MCG: 0.5 SUSPENSION RESPIRATORY (INHALATION) at 09:58

## 2019-12-02 RX ADMIN — INSULIN HUMAN 45 UNITS: 100 INJECTION, SUSPENSION SUBCUTANEOUS at 10:16

## 2019-12-02 RX ADMIN — INSULIN HUMAN 25 UNITS: 100 INJECTION, SUSPENSION SUBCUTANEOUS at 21:02

## 2019-12-02 RX ADMIN — XENON XE-133 5.4 MILLICURIE: 10 GAS RESPIRATORY (INHALATION) at 12:01

## 2019-12-02 RX ADMIN — BUDESONIDE 500 MCG: 0.5 SUSPENSION RESPIRATORY (INHALATION) at 22:04

## 2019-12-02 RX ADMIN — ENOXAPARIN SODIUM 120 MG: 120 INJECTION SUBCUTANEOUS at 20:56

## 2019-12-02 RX ADMIN — MUPIROCIN: 20 OINTMENT TOPICAL at 10:52

## 2019-12-02 RX ADMIN — MUPIROCIN: 20 OINTMENT TOPICAL at 20:55

## 2019-12-02 RX ADMIN — Medication 5.7 MILLICURIE: at 12:01

## 2019-12-02 RX ADMIN — Medication 10 ML: at 21:08

## 2019-12-02 RX ADMIN — LIDOCAINE HYDROCHLORIDE 20 ML: 10 INJECTION, SOLUTION EPIDURAL; INFILTRATION; INTRACAUDAL; PERINEURAL at 09:00

## 2019-12-02 RX ADMIN — Medication: at 20:58

## 2019-12-02 ASSESSMENT — PAIN DESCRIPTION - ONSET: ONSET: GRADUAL

## 2019-12-02 ASSESSMENT — PAIN DESCRIPTION - FREQUENCY: FREQUENCY: INTERMITTENT

## 2019-12-02 ASSESSMENT — PAIN SCALES - GENERAL
PAINLEVEL_OUTOF10: 0
PAINLEVEL_OUTOF10: 3
PAINLEVEL_OUTOF10: 0
PAINLEVEL_OUTOF10: 4
PAINLEVEL_OUTOF10: 0

## 2019-12-02 ASSESSMENT — PAIN DESCRIPTION - ORIENTATION: ORIENTATION: RIGHT

## 2019-12-02 ASSESSMENT — PAIN DESCRIPTION - DESCRIPTORS
DESCRIPTORS: HEADACHE
DESCRIPTORS: ACHING;DISCOMFORT

## 2019-12-02 ASSESSMENT — PAIN DESCRIPTION - LOCATION
LOCATION: LEG
LOCATION: HEAD

## 2019-12-02 ASSESSMENT — PAIN - FUNCTIONAL ASSESSMENT
PAIN_FUNCTIONAL_ASSESSMENT: ACTIVITIES ARE NOT PREVENTED
PAIN_FUNCTIONAL_ASSESSMENT: ACTIVITIES ARE NOT PREVENTED

## 2019-12-02 ASSESSMENT — PAIN DESCRIPTION - PROGRESSION: CLINICAL_PROGRESSION: NOT CHANGED

## 2019-12-03 PROBLEM — I26.99 ACUTE PULMONARY EMBOLISM (HCC): Status: ACTIVE | Noted: 2019-12-03

## 2019-12-03 LAB
ALBUMIN SERPL-MCNC: 3.4 G/DL (ref 3.4–5)
ALP BLD-CCNC: 120 U/L (ref 40–129)
ALT SERPL-CCNC: 32 U/L (ref 10–40)
ANION GAP SERPL CALCULATED.3IONS-SCNC: 13 MMOL/L (ref 3–16)
AST SERPL-CCNC: 23 U/L (ref 15–37)
BILIRUB SERPL-MCNC: 0.6 MG/DL (ref 0–1)
BILIRUBIN DIRECT: <0.2 MG/DL (ref 0–0.3)
BILIRUBIN, INDIRECT: NORMAL MG/DL (ref 0–1)
BUN BLDV-MCNC: 30 MG/DL (ref 7–20)
CALCIUM SERPL-MCNC: 8.7 MG/DL (ref 8.3–10.6)
CHLORIDE BLD-SCNC: 103 MMOL/L (ref 99–110)
CO2: 25 MMOL/L (ref 21–32)
CREAT SERPL-MCNC: 1.2 MG/DL (ref 0.6–1.2)
GFR AFRICAN AMERICAN: 53
GFR NON-AFRICAN AMERICAN: 44
GLUCOSE BLD-MCNC: 130 MG/DL (ref 70–99)
GLUCOSE BLD-MCNC: 252 MG/DL (ref 70–99)
GLUCOSE BLD-MCNC: 256 MG/DL (ref 70–99)
GLUCOSE BLD-MCNC: 68 MG/DL (ref 70–99)
GLUCOSE BLD-MCNC: 76 MG/DL (ref 70–99)
GLUCOSE BLD-MCNC: 82 MG/DL (ref 70–99)
GLUCOSE BLD-MCNC: 82 MG/DL (ref 70–99)
GRAM STAIN RESULT: ABNORMAL
LV EF: 58 %
LVEF MODALITY: NORMAL
MAGNESIUM: 2.1 MG/DL (ref 1.8–2.4)
ORGANISM: ABNORMAL
ORGANISM: ABNORMAL
PERFORMED ON: ABNORMAL
PERFORMED ON: NORMAL
POTASSIUM SERPL-SCNC: 3.9 MMOL/L (ref 3.5–5.1)
SODIUM BLD-SCNC: 141 MMOL/L (ref 136–145)
TOTAL PROTEIN: 7 G/DL (ref 6.4–8.2)
WOUND/ABSCESS: ABNORMAL

## 2019-12-03 PROCEDURE — 93306 TTE W/DOPPLER COMPLETE: CPT

## 2019-12-03 PROCEDURE — 36415 COLL VENOUS BLD VENIPUNCTURE: CPT

## 2019-12-03 PROCEDURE — 6360000002 HC RX W HCPCS: Performed by: INTERNAL MEDICINE

## 2019-12-03 PROCEDURE — 94761 N-INVAS EAR/PLS OXIMETRY MLT: CPT

## 2019-12-03 PROCEDURE — 83735 ASSAY OF MAGNESIUM: CPT

## 2019-12-03 PROCEDURE — 2060000000 HC ICU INTERMEDIATE R&B

## 2019-12-03 PROCEDURE — 97165 OT EVAL LOW COMPLEX 30 MIN: CPT

## 2019-12-03 PROCEDURE — 2580000003 HC RX 258: Performed by: INTERNAL MEDICINE

## 2019-12-03 PROCEDURE — 6370000000 HC RX 637 (ALT 250 FOR IP): Performed by: INTERNAL MEDICINE

## 2019-12-03 PROCEDURE — 80076 HEPATIC FUNCTION PANEL: CPT

## 2019-12-03 PROCEDURE — 80048 BASIC METABOLIC PNL TOTAL CA: CPT

## 2019-12-03 PROCEDURE — 97535 SELF CARE MNGMENT TRAINING: CPT

## 2019-12-03 PROCEDURE — 97161 PT EVAL LOW COMPLEX 20 MIN: CPT

## 2019-12-03 PROCEDURE — 94640 AIRWAY INHALATION TREATMENT: CPT

## 2019-12-03 PROCEDURE — 94680 O2 UPTK RST&XERS DIR SIMPLE: CPT

## 2019-12-03 PROCEDURE — 2700000000 HC OXYGEN THERAPY PER DAY

## 2019-12-03 PROCEDURE — 99232 SBSQ HOSP IP/OBS MODERATE 35: CPT | Performed by: INTERNAL MEDICINE

## 2019-12-03 RX ORDER — CHLORTHALIDONE 25 MG/1
12.5 TABLET ORAL DAILY
Qty: 90 TABLET | Refills: 1 | Status: SHIPPED | OUTPATIENT
Start: 2019-12-03 | End: 2019-12-04 | Stop reason: HOSPADM

## 2019-12-03 RX ORDER — LABETALOL 100 MG/1
100 TABLET, FILM COATED ORAL EVERY 12 HOURS SCHEDULED
Status: DISCONTINUED | OUTPATIENT
Start: 2019-12-03 | End: 2019-12-04 | Stop reason: HOSPADM

## 2019-12-03 RX ADMIN — FORMOTEROL FUMARATE DIHYDRATE 20 MCG: 20 SOLUTION RESPIRATORY (INHALATION) at 10:34

## 2019-12-03 RX ADMIN — MUPIROCIN: 20 OINTMENT TOPICAL at 08:32

## 2019-12-03 RX ADMIN — Medication: at 20:37

## 2019-12-03 RX ADMIN — Medication: at 08:32

## 2019-12-03 RX ADMIN — FORMOTEROL FUMARATE DIHYDRATE 20 MCG: 20 SOLUTION RESPIRATORY (INHALATION) at 20:19

## 2019-12-03 RX ADMIN — Medication 10 ML: at 08:35

## 2019-12-03 RX ADMIN — INSULIN HUMAN 25 UNITS: 100 INJECTION, SUSPENSION SUBCUTANEOUS at 10:44

## 2019-12-03 RX ADMIN — DOXYCYCLINE 100 MG: 100 CAPSULE ORAL at 08:32

## 2019-12-03 RX ADMIN — BUDESONIDE 500 MCG: 0.5 SUSPENSION RESPIRATORY (INHALATION) at 10:35

## 2019-12-03 RX ADMIN — LABETALOL HYDROCHLORIDE 100 MG: 100 TABLET, FILM COATED ORAL at 20:35

## 2019-12-03 RX ADMIN — ENOXAPARIN SODIUM 120 MG: 120 INJECTION SUBCUTANEOUS at 20:35

## 2019-12-03 RX ADMIN — MUPIROCIN: 20 OINTMENT TOPICAL at 20:37

## 2019-12-03 RX ADMIN — INSULIN HUMAN 25 UNITS: 100 INJECTION, SUSPENSION SUBCUTANEOUS at 20:38

## 2019-12-03 RX ADMIN — BUDESONIDE 500 MCG: 0.5 SUSPENSION RESPIRATORY (INHALATION) at 20:19

## 2019-12-03 RX ADMIN — SIMVASTATIN 20 MG: 20 TABLET, FILM COATED ORAL at 20:35

## 2019-12-03 RX ADMIN — Medication 10 ML: at 20:43

## 2019-12-03 RX ADMIN — ENOXAPARIN SODIUM 120 MG: 120 INJECTION SUBCUTANEOUS at 08:32

## 2019-12-03 ASSESSMENT — PAIN SCALES - GENERAL
PAINLEVEL_OUTOF10: 0

## 2019-12-04 VITALS
BODY MASS INDEX: 44.39 KG/M2 | SYSTOLIC BLOOD PRESSURE: 135 MMHG | OXYGEN SATURATION: 93 % | WEIGHT: 260 LBS | TEMPERATURE: 97.1 F | HEIGHT: 64 IN | RESPIRATION RATE: 18 BRPM | DIASTOLIC BLOOD PRESSURE: 71 MMHG | HEART RATE: 79 BPM

## 2019-12-04 LAB
GLUCOSE BLD-MCNC: 140 MG/DL (ref 70–99)
GLUCOSE BLD-MCNC: 222 MG/DL (ref 70–99)
PERFORMED ON: ABNORMAL
PERFORMED ON: ABNORMAL

## 2019-12-04 PROCEDURE — 94761 N-INVAS EAR/PLS OXIMETRY MLT: CPT

## 2019-12-04 PROCEDURE — 6370000000 HC RX 637 (ALT 250 FOR IP): Performed by: INTERNAL MEDICINE

## 2019-12-04 PROCEDURE — 6370000000 HC RX 637 (ALT 250 FOR IP): Performed by: STUDENT IN AN ORGANIZED HEALTH CARE EDUCATION/TRAINING PROGRAM

## 2019-12-04 PROCEDURE — 6360000002 HC RX W HCPCS: Performed by: INTERNAL MEDICINE

## 2019-12-04 PROCEDURE — 94640 AIRWAY INHALATION TREATMENT: CPT

## 2019-12-04 PROCEDURE — 2580000003 HC RX 258: Performed by: INTERNAL MEDICINE

## 2019-12-04 RX ADMIN — FORMOTEROL FUMARATE DIHYDRATE 20 MCG: 20 SOLUTION RESPIRATORY (INHALATION) at 09:01

## 2019-12-04 RX ADMIN — BUDESONIDE 500 MCG: 0.5 SUSPENSION RESPIRATORY (INHALATION) at 09:01

## 2019-12-04 RX ADMIN — Medication 10 ML: at 09:27

## 2019-12-04 RX ADMIN — Medication: at 09:23

## 2019-12-04 RX ADMIN — LABETALOL HYDROCHLORIDE 100 MG: 100 TABLET, FILM COATED ORAL at 09:17

## 2019-12-04 RX ADMIN — HYOSCYAMINE SULFATE: 16 SOLUTION at 09:26

## 2019-12-04 RX ADMIN — APIXABAN 10 MG: 5 TABLET, FILM COATED ORAL at 09:17

## 2019-12-04 RX ADMIN — INSULIN HUMAN 45 UNITS: 100 INJECTION, SUSPENSION SUBCUTANEOUS at 09:18

## 2019-12-04 RX ADMIN — MUPIROCIN: 20 OINTMENT TOPICAL at 09:24

## 2019-12-04 ASSESSMENT — PAIN SCALES - GENERAL
PAINLEVEL_OUTOF10: 0

## 2019-12-05 ENCOUNTER — CARE COORDINATION (OUTPATIENT)
Dept: CASE MANAGEMENT | Age: 74
End: 2019-12-05

## 2019-12-05 DIAGNOSIS — J96.01 ACUTE RESPIRATORY FAILURE WITH HYPOXIA (HCC): Primary | ICD-10-CM

## 2019-12-05 PROCEDURE — 1111F DSCHRG MED/CURRENT MED MERGE: CPT | Performed by: FAMILY MEDICINE

## 2019-12-06 RX ORDER — SIMVASTATIN 20 MG
TABLET ORAL
Qty: 90 TABLET | Refills: 0 | OUTPATIENT
Start: 2019-12-06

## 2019-12-09 ENCOUNTER — OFFICE VISIT (OUTPATIENT)
Dept: PRIMARY CARE CLINIC | Age: 74
End: 2019-12-09
Payer: MEDICARE

## 2019-12-09 ENCOUNTER — TELEPHONE (OUTPATIENT)
Dept: PRIMARY CARE CLINIC | Age: 74
End: 2019-12-09

## 2019-12-09 VITALS
WEIGHT: 265.4 LBS | HEIGHT: 64 IN | SYSTOLIC BLOOD PRESSURE: 134 MMHG | BODY MASS INDEX: 45.31 KG/M2 | OXYGEN SATURATION: 94 % | DIASTOLIC BLOOD PRESSURE: 60 MMHG | TEMPERATURE: 98.8 F | HEART RATE: 72 BPM

## 2019-12-09 DIAGNOSIS — I26.99 OTHER ACUTE PULMONARY EMBOLISM, UNSPECIFIED WHETHER ACUTE COR PULMONALE PRESENT (HCC): ICD-10-CM

## 2019-12-09 DIAGNOSIS — Z09 HOSPITAL DISCHARGE FOLLOW-UP: Primary | ICD-10-CM

## 2019-12-09 DIAGNOSIS — R91.1 INCIDENTAL LUNG NODULE, GREATER THAN OR EQUAL TO 8MM: ICD-10-CM

## 2019-12-09 DIAGNOSIS — E78.5 HYPERLIPIDEMIA, UNSPECIFIED HYPERLIPIDEMIA TYPE: ICD-10-CM

## 2019-12-09 DIAGNOSIS — I10 ESSENTIAL HYPERTENSION: ICD-10-CM

## 2019-12-09 PROBLEM — J96.01 ACUTE RESPIRATORY FAILURE WITH HYPOXIA (HCC): Status: RESOLVED | Noted: 2019-11-30 | Resolved: 2019-12-09

## 2019-12-09 PROCEDURE — 99204 OFFICE O/P NEW MOD 45 MIN: CPT | Performed by: FAMILY MEDICINE

## 2019-12-09 PROCEDURE — 3017F COLORECTAL CA SCREEN DOC REV: CPT | Performed by: FAMILY MEDICINE

## 2019-12-09 PROCEDURE — G8417 CALC BMI ABV UP PARAM F/U: HCPCS | Performed by: FAMILY MEDICINE

## 2019-12-09 PROCEDURE — G8482 FLU IMMUNIZE ORDER/ADMIN: HCPCS | Performed by: FAMILY MEDICINE

## 2019-12-09 PROCEDURE — 1036F TOBACCO NON-USER: CPT | Performed by: FAMILY MEDICINE

## 2019-12-09 PROCEDURE — 1123F ACP DISCUSS/DSCN MKR DOCD: CPT | Performed by: FAMILY MEDICINE

## 2019-12-09 PROCEDURE — G8427 DOCREV CUR MEDS BY ELIG CLIN: HCPCS | Performed by: FAMILY MEDICINE

## 2019-12-09 PROCEDURE — 2022F DILAT RTA XM EVC RTNOPTHY: CPT | Performed by: FAMILY MEDICINE

## 2019-12-09 PROCEDURE — G8399 PT W/DXA RESULTS DOCUMENT: HCPCS | Performed by: FAMILY MEDICINE

## 2019-12-09 PROCEDURE — 1090F PRES/ABSN URINE INCON ASSESS: CPT | Performed by: FAMILY MEDICINE

## 2019-12-09 PROCEDURE — 4040F PNEUMOC VAC/ADMIN/RCVD: CPT | Performed by: FAMILY MEDICINE

## 2019-12-09 PROCEDURE — 3046F HEMOGLOBIN A1C LEVEL >9.0%: CPT | Performed by: FAMILY MEDICINE

## 2019-12-09 PROCEDURE — 1111F DSCHRG MED/CURRENT MED MERGE: CPT | Performed by: FAMILY MEDICINE

## 2019-12-09 RX ORDER — SIMVASTATIN 20 MG
TABLET ORAL
Qty: 90 TABLET | Refills: 1 | Status: SHIPPED | OUTPATIENT
Start: 2019-12-09 | End: 2020-01-22 | Stop reason: SDUPTHER

## 2019-12-09 RX ORDER — LABETALOL 100 MG/1
TABLET, FILM COATED ORAL
Qty: 180 TABLET | Refills: 1 | Status: SHIPPED | OUTPATIENT
Start: 2019-12-09 | End: 2020-06-07

## 2019-12-09 ASSESSMENT — ENCOUNTER SYMPTOMS
CONSTIPATION: 0
DIARRHEA: 0
WHEEZING: 0
COUGH: 1
RHINORRHEA: 0
NAUSEA: 0
ABDOMINAL PAIN: 0
SINUS PAIN: 0
SINUS PRESSURE: 0
SHORTNESS OF BREATH: 0

## 2019-12-09 NOTE — PROGRESS NOTES
[facility] ([facility]) Surgical Oncology  Deluca Rout    HPI: Ariela Fuentes is here today as a hospital follow up for Cholelithiasis. Ariela Fuentes was seen in the ER on 19 with complaints of dyspnea on exertion and chest pain. A CT scan of chest obtained that incidentally showed gallbladder stones and wall calcifications. Patient denies any issues with gallbladder in past. Denies abdominal pain with eating, RUQ pain or acid reflux. No previous abdominal surgery. Currently denies any abdominal pain. Past Medical History:   Diagnosis Date    Acute respiratory failure with hypoxia ([address] 2019    Hyperlipidemia     Hypertension     Mallet finger 3/4/2015    Type II or unspecified type diabetes mellitus without mention of complication, not stated as uncontrolled      Past Surgical History:   Procedure Laterality Date    BREAST BIOPSY Right      Social:   Social History     Tobacco Use    Smoking status: Former Smoker     Packs/day: 0.25     Years: 38.00     Pack years: 9.50     Types: Cigarettes     Start date: 10/22/1958     Last attempt to quit: 1980     Years since quittin.9    Smokeless tobacco: Never Used   Substance Use Topics    Alcohol use: Yes    Drug use: Never     Family History   Problem Relation Age of Onset    Diabetes Father     Heart Disease Father      Allergies: Adhesive tape; Aleve [naproxen sodium]; Cozaar [losartan potassium]; Iodine; Lasix [furosemide]; Lisinopril; Metformin and related; Nifedipine; Norvasc [amlodipine besylate]; Novolog [insulin aspart];  Pioglitazone; and Zoloft [sertraline hcl]  Current Outpatient Medications   Medication Sig Dispense Refill    insulin NPH (HUMULIN N) 100 UNIT/ML injection vial 45 units in the am and 25 units HS 2 vial 3    insulin regular (HUMULIN R) 100 UNIT/ML injection 20 units lunch and dinner 2 vial 3    labetalol (NORMODYNE) 100 MG tablet TAKE ONE TABLET BY MOUTH TWICE A  tablet 1    simvastatin (ZOCOR) 20 MG tablet TAKE ONE TABLET BY MOUTH ONCE NIGHTLY 90 tablet 1    apixaban (ELIQUIS STARTER PACK) 5 MG TABS tablet Take 10 mg (2 tablets) orally twice daily for 7 days, then take 5 mg (1 tablet) orally twice daily thereafter. 74 tablet 0    budesonide-formoterol (SYMBICORT) 160-4.5 MCG/ACT AERO Inhale 2 puffs into the lungs 2 times daily 3 Inhaler 1    Insulin Syringe-Needle U-100 (BD INSULIN SYRINGE U/F) 31G X 5/16\" 0.5 ML MISC USE ONE SYRINGE FOUR TIMES A  each 5    B-D ULTRAFINE III SHORT PEN 31G X 8 MM MISC USE WITH INSULIN TWO TIMES A DAY AS DIRECTED 100 each 2    ONETOUCH DELICA LANCETS 97B MISC USE TO TEST BLOOD SUGAR THREE TIMES A  each 0    Blood Pressure KIT Apply 1 applicator topically 2 times daily (Patient not taking: Reported on 12/9/2019) 1 kit 0    blood glucose monitor strips Test 3-4 times a day & as needed for symptoms of irregular blood glucose. DX: E11.9. Supply the strips that go with her meter and approved by insurance 300 strip 1    Lancets MISC 1 each by Does not apply route daily Test 3-4 times a day & as needed for symptoms of irregular blood glucose. DX: E11.9. Supply the lancets that go with her meter and approved by insurance 300 each 3    Continuous Blood Gluc Sensor (FREESTYLE VÍCTOR 14 DAY SENSOR) MISC CHECK GLUCOSE 4 TIMES DAILY 2 each 2    Continuous Blood Gluc  (FREESTYLE VÍCTOR 14 DAY READER) CARMEN RECORD BLOOD SUGAR 4 TIMES DAILY 1 Device 0    ONE TOUCH ULTRA TEST strip USE TO TEST BLOOD SUGAR TWO TIMES A DAY AS DIRECTED 100 strip 3    glucose monitoring kit (FREESTYLE) monitoring kit Use a ONE TOUCH 1 kit 0    Blood Glucose Monitoring Suppl (BLOOD GLUCOSE METER) KIT 1 Device by Does not apply route 3 times daily. 1 kit 0    EPINEPHrine (EPIPEN 2-OSMAR) 0.3 MG/0.3ML SOAJ injection Inject 0.3 mLs into the muscle once for 1 dose. Use as directed for allergic reaction 0.3 mL 2     No current facility-administered medications for this visit.         Review of Systems: See HPI  All other systems reviewed and are negative. Vitals:    12/10/19 1241 12/10/19 1247   BP: (!) 143/65 (!) 138/57   Pulse: 66    Temp: 97.1 °F (36.2 °C)    TempSrc: Oral    SpO2: 94%    Weight: 267 lb (121.1 kg)    Height: 5' 4\" (1.626 m)      Wt Readings from Last 3 Encounters:   12/10/19 267 lb (121.1 kg)   12/09/19 265 lb 6.4 oz (120.4 kg)   11/30/19 260 lb (117.9 kg)     Body mass index is 45.83 kg/m². Physical Exam:   Constitutional: She is oriented to person, place, and time. She appears well-developed and well-nourished. No distress. HENT: Moist mucus membranes  Head: Normocephalic and atraumatic. Eyes: EOM are normal. Pupils are equal, round, and no icterus. Neck: Normal range of motion. Neck supple. No thyromegaly present. Cardiovascular: Normal rate and regular rhythm by peripheral pulses. Pulmonary/Chest: No respiratory distress. Bilateral symmetrical chest rise. Abdominal: Soft. She exhibits no distension and no mass. There is no hepatosplenomegaly. No tenderness. Extremities: She exhibits no bilateral edema. Lymphadenopathy: She has no bilateral cervical adenopathy. Neurological: Grossly intact motor and sensory exam.   Skin: Skin is warm and dry. Psychiatric: She has a normal mood and affect. Appropriate thought process and judgement capacity. CT Chest W/O 11/30/19:     Gallstones noted with gallbladder wall calcification. This may indicate porcelain gallbladder. Extensive coronary calcification. 8 mm nodule seen posteriorly in left lung base. Following the Fleischner Society 2017 guidelines for single solid nodules >8 mm, if patient is low risk then consider CT at 3 months, PET/CT, or tissue sampling.  If patient is high risk, then consider CT at 3 months, PET/CT, or tissue sampling.  qzxv  No acute intrathoracic pathology identified. Assessment/Plan:   Diagnosis Orders   1.  Calculus of gallbladder without cholecystitis without obstruction       I discussed with the Kim Barber

## 2019-12-10 ENCOUNTER — CARE COORDINATION (OUTPATIENT)
Dept: CASE MANAGEMENT | Age: 74
End: 2019-12-10

## 2019-12-10 ENCOUNTER — OFFICE VISIT (OUTPATIENT)
Dept: SURGERY | Age: 74
End: 2019-12-10
Payer: MEDICARE

## 2019-12-10 VITALS
DIASTOLIC BLOOD PRESSURE: 57 MMHG | TEMPERATURE: 97.1 F | HEIGHT: 64 IN | HEART RATE: 66 BPM | SYSTOLIC BLOOD PRESSURE: 138 MMHG | BODY MASS INDEX: 45.58 KG/M2 | WEIGHT: 267 LBS | OXYGEN SATURATION: 94 %

## 2019-12-10 PROCEDURE — G8482 FLU IMMUNIZE ORDER/ADMIN: HCPCS | Performed by: SURGERY

## 2019-12-10 PROCEDURE — G8417 CALC BMI ABV UP PARAM F/U: HCPCS | Performed by: SURGERY

## 2019-12-10 PROCEDURE — 1090F PRES/ABSN URINE INCON ASSESS: CPT | Performed by: SURGERY

## 2019-12-10 PROCEDURE — G8427 DOCREV CUR MEDS BY ELIG CLIN: HCPCS | Performed by: SURGERY

## 2019-12-10 PROCEDURE — 1111F DSCHRG MED/CURRENT MED MERGE: CPT | Performed by: SURGERY

## 2019-12-10 PROCEDURE — 4040F PNEUMOC VAC/ADMIN/RCVD: CPT | Performed by: SURGERY

## 2019-12-10 PROCEDURE — 3017F COLORECTAL CA SCREEN DOC REV: CPT | Performed by: SURGERY

## 2019-12-10 PROCEDURE — G8399 PT W/DXA RESULTS DOCUMENT: HCPCS | Performed by: SURGERY

## 2019-12-10 PROCEDURE — 1036F TOBACCO NON-USER: CPT | Performed by: SURGERY

## 2019-12-10 PROCEDURE — 1123F ACP DISCUSS/DSCN MKR DOCD: CPT | Performed by: SURGERY

## 2019-12-10 PROCEDURE — 99213 OFFICE O/P EST LOW 20 MIN: CPT | Performed by: SURGERY

## 2019-12-12 ENCOUNTER — CARE COORDINATION (OUTPATIENT)
Dept: CASE MANAGEMENT | Age: 74
End: 2019-12-12

## 2019-12-12 ENCOUNTER — HOSPITAL ENCOUNTER (OUTPATIENT)
Dept: WOUND CARE | Age: 74
Discharge: HOME | End: 2019-12-12
Payer: MEDICARE

## 2019-12-12 VITALS
WEIGHT: 266.32 LBS | RESPIRATION RATE: 16 BRPM | SYSTOLIC BLOOD PRESSURE: 135 MMHG | HEART RATE: 67 BPM | TEMPERATURE: 97.4 F | BODY MASS INDEX: 45.71 KG/M2 | DIASTOLIC BLOOD PRESSURE: 62 MMHG

## 2019-12-12 DIAGNOSIS — I83.212 VARICOSE VEINS OF RIGHT LOWER EXTREMITY WITH INFLAMMATION, WITH ULCER OF CALF LIMITED TO BREAKDOWN OF SKIN (HCC): Primary | ICD-10-CM

## 2019-12-12 DIAGNOSIS — L97.211 VARICOSE VEINS OF RIGHT LOWER EXTREMITY WITH INFLAMMATION, WITH ULCER OF CALF LIMITED TO BREAKDOWN OF SKIN (HCC): Primary | ICD-10-CM

## 2019-12-12 PROCEDURE — 99213 OFFICE O/P EST LOW 20 MIN: CPT

## 2019-12-12 RX ORDER — LIDOCAINE HYDROCHLORIDE 40 MG/ML
5 SOLUTION TOPICAL ONCE
Status: CANCELLED | OUTPATIENT
Start: 2019-12-12

## 2019-12-12 RX ORDER — CLOBETASOL PROPIONATE 0.5 MG/G
OINTMENT TOPICAL ONCE
Status: CANCELLED | OUTPATIENT
Start: 2019-12-12

## 2019-12-12 RX ORDER — LIDOCAINE 50 MG/G
0.5 OINTMENT TOPICAL ONCE
Status: CANCELLED | OUTPATIENT
Start: 2019-12-12

## 2019-12-12 RX ORDER — BACITRACIN, NEOMYCIN, POLYMYXIN B 400; 3.5; 5 [USP'U]/G; MG/G; [USP'U]/G
OINTMENT TOPICAL ONCE
Status: CANCELLED | OUTPATIENT
Start: 2019-12-12

## 2019-12-12 RX ORDER — BACITRACIN ZINC AND POLYMYXIN B SULFATE 500; 1000 [USP'U]/G; [USP'U]/G
OINTMENT TOPICAL ONCE
Status: CANCELLED | OUTPATIENT
Start: 2019-12-12

## 2019-12-12 RX ORDER — LIDOCAINE 40 MG/G
CREAM TOPICAL ONCE
Status: CANCELLED | OUTPATIENT
Start: 2019-12-12

## 2019-12-12 RX ORDER — GINSENG 100 MG
CAPSULE ORAL ONCE
Status: CANCELLED | OUTPATIENT
Start: 2019-12-12

## 2019-12-12 RX ORDER — GENTAMICIN SULFATE 1 MG/G
OINTMENT TOPICAL ONCE
Status: CANCELLED | OUTPATIENT
Start: 2019-12-12

## 2019-12-12 RX ORDER — BETAMETHASONE DIPROPIONATE 0.05 %
OINTMENT (GRAM) TOPICAL ONCE
Status: CANCELLED | OUTPATIENT
Start: 2019-12-12

## 2019-12-12 ASSESSMENT — PAIN SCALES - GENERAL
PAINLEVEL_OUTOF10: 0
PAINLEVEL_OUTOF10: 0

## 2019-12-16 ENCOUNTER — CARE COORDINATION (OUTPATIENT)
Dept: CASE MANAGEMENT | Age: 74
End: 2019-12-16

## 2019-12-19 ENCOUNTER — CARE COORDINATION (OUTPATIENT)
Dept: CASE MANAGEMENT | Age: 74
End: 2019-12-19

## 2019-12-23 ENCOUNTER — TELEPHONE (OUTPATIENT)
Dept: CASE MANAGEMENT | Age: 74
End: 2019-12-23

## 2020-01-02 ENCOUNTER — TELEPHONE (OUTPATIENT)
Dept: PRIMARY CARE CLINIC | Age: 75
End: 2020-01-02

## 2020-01-22 ENCOUNTER — OFFICE VISIT (OUTPATIENT)
Dept: PRIMARY CARE CLINIC | Age: 75
End: 2020-01-22
Payer: MEDICARE

## 2020-01-22 VITALS
DIASTOLIC BLOOD PRESSURE: 52 MMHG | SYSTOLIC BLOOD PRESSURE: 151 MMHG | TEMPERATURE: 97.7 F | BODY MASS INDEX: 46.37 KG/M2 | HEIGHT: 64 IN | HEART RATE: 63 BPM | WEIGHT: 271.6 LBS

## 2020-01-22 LAB — HBA1C MFR BLD: 8.8 %

## 2020-01-22 PROCEDURE — G8399 PT W/DXA RESULTS DOCUMENT: HCPCS | Performed by: FAMILY MEDICINE

## 2020-01-22 PROCEDURE — 2022F DILAT RTA XM EVC RTNOPTHY: CPT | Performed by: FAMILY MEDICINE

## 2020-01-22 PROCEDURE — 1090F PRES/ABSN URINE INCON ASSESS: CPT | Performed by: FAMILY MEDICINE

## 2020-01-22 PROCEDURE — G8427 DOCREV CUR MEDS BY ELIG CLIN: HCPCS | Performed by: FAMILY MEDICINE

## 2020-01-22 PROCEDURE — G8482 FLU IMMUNIZE ORDER/ADMIN: HCPCS | Performed by: FAMILY MEDICINE

## 2020-01-22 PROCEDURE — 3017F COLORECTAL CA SCREEN DOC REV: CPT | Performed by: FAMILY MEDICINE

## 2020-01-22 PROCEDURE — 1123F ACP DISCUSS/DSCN MKR DOCD: CPT | Performed by: FAMILY MEDICINE

## 2020-01-22 PROCEDURE — 1036F TOBACCO NON-USER: CPT | Performed by: FAMILY MEDICINE

## 2020-01-22 PROCEDURE — G8417 CALC BMI ABV UP PARAM F/U: HCPCS | Performed by: FAMILY MEDICINE

## 2020-01-22 PROCEDURE — 99215 OFFICE O/P EST HI 40 MIN: CPT | Performed by: FAMILY MEDICINE

## 2020-01-22 PROCEDURE — 83036 HEMOGLOBIN GLYCOSYLATED A1C: CPT | Performed by: FAMILY MEDICINE

## 2020-01-22 PROCEDURE — 4040F PNEUMOC VAC/ADMIN/RCVD: CPT | Performed by: FAMILY MEDICINE

## 2020-01-22 RX ORDER — SIMVASTATIN 20 MG
TABLET ORAL
Qty: 90 TABLET | Refills: 1 | Status: SHIPPED | OUTPATIENT
Start: 2020-01-22 | End: 2020-12-06

## 2020-01-22 ASSESSMENT — ENCOUNTER SYMPTOMS
DIARRHEA: 0
SORE THROAT: 0
EYE PAIN: 0
RHINORRHEA: 0
ABDOMINAL PAIN: 0
CHEST TIGHTNESS: 0
BLOOD IN STOOL: 0
SINUS PAIN: 0
SINUS PRESSURE: 0
NAUSEA: 0
SHORTNESS OF BREATH: 0
BACK PAIN: 0
COUGH: 1
VOMITING: 0
CONSTIPATION: 0
EYE REDNESS: 0
WHEEZING: 0
EYE ITCHING: 0

## 2020-01-22 ASSESSMENT — PATIENT HEALTH QUESTIONNAIRE - PHQ9
2. FEELING DOWN, DEPRESSED OR HOPELESS: 1
SUM OF ALL RESPONSES TO PHQ QUESTIONS 1-9: 1
SUM OF ALL RESPONSES TO PHQ9 QUESTIONS 1 & 2: 1
SUM OF ALL RESPONSES TO PHQ QUESTIONS 1-9: 1
1. LITTLE INTEREST OR PLEASURE IN DOING THINGS: 0

## 2020-01-22 NOTE — PROGRESS NOTES
environmental allergies. Neurological: Positive for numbness. Negative for dizziness, tremors, syncope, weakness, light-headedness and headaches. BL feet. Hematological: Negative for adenopathy. Psychiatric/Behavioral: Negative for behavioral problems, decreased concentration, sleep disturbance and suicidal ideas. The patient is not nervous/anxious. Past Medical History:   Diagnosis Date    Acute respiratory failure with hypoxia ([address] 2019    Hyperlipidemia     Hypertension     Mallet finger 3/4/2015    Type II or unspecified type diabetes mellitus without mention of complication, not stated as uncontrolled        Family History   Problem Relation Age of Onset    Diabetes Father     Heart Disease Father        Social History     Tobacco Use    Smoking status: Former Smoker     Packs/day: 0.25     Years: 38.00     Pack years: 9.50     Types: Cigarettes     Start date: 10/22/1958     Last attempt to quit: 1980     Years since quittin.0    Smokeless tobacco: Never Used   Substance Use Topics    Alcohol use:  Yes     Alcohol/week: 1.0 standard drinks     Types: 1 Cans of beer per week     Comment: occasional     Drug use: Never       New Prescriptions    HANDICAP PLACARD MISC    by Does not apply route Duration 5 years       Meds Prior to visit:  Current Outpatient Medications on File Prior to Visit   Medication Sig Dispense Refill    insulin regular (HUMULIN R) 100 UNIT/ML injection 20 units lunch and dinner 2 vial 3    labetalol (NORMODYNE) 100 MG tablet TAKE ONE TABLET BY MOUTH TWICE A  tablet 1    budesonide-formoterol (SYMBICORT) 160-4.5 MCG/ACT AERO Inhale 2 puffs into the lungs 2 times daily 3 Inhaler 1    Insulin Syringe-Needle U-100 (BD INSULIN SYRINGE U/F) 31G X \" 0.5 ML MISC USE ONE SYRINGE FOUR TIMES A  each 5    ONETOUCH DELICA LANCETS 94K MISC USE TO TEST BLOOD SUGAR THREE TIMES A  each 0    Blood Pressure KIT Apply 1 applicator topically 2 times daily 1 kit 0    blood glucose monitor strips Test 3-4 times a day & as needed for symptoms of irregular blood glucose. DX: E11.9. Supply the strips that go with her meter and approved by insurance 300 strip 1    Lancets MISC 1 each by Does not apply route daily Test 3-4 times a day & as needed for symptoms of irregular blood glucose. DX: E11.9. Supply the lancets that go with her meter and approved by insurance 300 each 3    Continuous Blood Gluc Sensor (FREESTYLE VÍCTOR 14 DAY SENSOR) MISC CHECK GLUCOSE 4 TIMES DAILY 2 each 2    Continuous Blood Gluc  (FREESTYLE VÍCTOR 14 DAY READER) CARMEN RECORD BLOOD SUGAR 4 TIMES DAILY 1 Device 0    ONE TOUCH ULTRA TEST strip USE TO TEST BLOOD SUGAR TWO TIMES A DAY AS DIRECTED 100 strip 3    B-D ULTRAFINE III SHORT PEN 31G X 8 MM MISC USE WITH INSULIN TWO TIMES A DAY AS DIRECTED 100 each 2    glucose monitoring kit (FREESTYLE) monitoring kit Use a ONE TOUCH 1 kit 0    Blood Glucose Monitoring Suppl (BLOOD GLUCOSE METER) KIT 1 Device by Does not apply route 3 times daily. 1 kit 0    EPINEPHrine (EPIPEN 2-OSMAR) 0.3 MG/0.3ML SOAJ injection Inject 0.3 mLs into the muscle once for 1 dose. Use as directed for allergic reaction (Patient not taking: Reported on 1/22/2020) 0.3 mL 2    [DISCONTINUED] sitagliptan (JANUVIA) 100 MG tablet Take 1 tablet by mouth daily for 360 days. 30 tablet 3     No current facility-administered medications on file prior to visit.       Allergies   Allergen Reactions    Adhesive Tape     Aleve [Naproxen Sodium]      Possible angioedema    Cozaar [Losartan Potassium]      angioedema    Iodine     Lasix [Furosemide] Swelling    Lisinopril      Cough and sore throat     Metformin And Related      Possible angioedema    Nifedipine      swells    Norvasc [Amlodipine Besylate]      Leg swelling    Novolog [Insulin Aspart]      dizzyness    Pioglitazone      edema    Zoloft [Sertraline Hcl]      Vision loss motion. Feet:    Feet:      Right foot:      Protective Sensation: 8 sites tested. 3 sites sensed. Skin integrity: Callus and dry skin present. Toenail Condition: Right toenails are abnormally thick. Left foot:      Protective Sensation: 8 sites tested. 2 sites sensed. Skin integrity: Callus and dry skin present. Toenail Condition: Left toenails are abnormally thick. Comments: Encircled area on plantar aspect of left foot, near the ball, there was a piece of glass in her foot. When removed, small wound bled but hemostasis was achieved. Wrapped before leaving. Skin:     General: Skin is warm. Capillary Refill: Capillary refill takes less than 2 seconds. Findings: Lesion present. Neurological:      General: No focal deficit present. Mental Status: She is alert and oriented to person, place, and time. Mental status is at baseline. Psychiatric:         Mood and Affect: Mood normal.         Behavior: Behavior normal.         Thought Content:  Thought content normal.         Judgment: Judgment normal.         Lab Results   Component Value Date    WBC 6.7 12/02/2019    HGB 12.2 12/02/2019    HCT 38.1 12/02/2019    MCV 85.6 12/02/2019     12/02/2019     Lab Results   Component Value Date     12/03/2019    K 3.9 12/03/2019     12/03/2019    CO2 25 12/03/2019    BUN 30 (H) 12/03/2019    CREATININE 1.2 12/03/2019    GLUCOSE 68 (L) 12/03/2019    CALCIUM 8.7 12/03/2019    PROT 7.0 12/03/2019    LABALBU 3.4 12/03/2019    BILITOT 0.6 12/03/2019    ALKPHOS 120 12/03/2019    AST 23 12/03/2019    ALT 32 12/03/2019    LABGLOM 44 (A) 12/03/2019    GFRAA 53 (A) 12/03/2019    AGRATIO 1.3 11/30/2019    GLOB 2.9 11/30/2019     Lab Results   Component Value Date    CHOL 167 10/22/2019    CHOL 146 03/27/2018    CHOL 148 09/25/2017     Lab Results   Component Value Date    TRIG 149 10/22/2019    TRIG 153 (H) 03/27/2018    TRIG 169 (H) 09/25/2017     Lab Results [facility])  Needs to follow-up with hematology/oncology. Referral printed and patient will call  - apixaban (ELIQUIS) 5 MG TABS tablet; Take 1 tablet by mouth 2 times daily  Dispense: 60 tablet; Refill: 5  - Handicap Placard MISC; by Does not apply route Duration 5 years  Dispense: 1 each; Refill: 0    7. Type 2 diabetes mellitus with pressure callus (HCC)  As above  - Diabetic Shoe  - Handicap Placard MISC; by Does not apply route Duration 5 years  Dispense: 1 each; Refill: 0      Discussed use, benefit, and side effects of prescribed medications. Barriers to medication compliance addressed. All patient questions answered. Pt voiced understanding. RTC Return in about 3 months (around 4/22/2020).     Future Appointments   Date Time Provider Fitz Gregg   4/22/2020  8:30 AM [facility] Mehran Marcos MD  1/22/2020  3:39 PM

## 2020-01-23 ENCOUNTER — TELEPHONE (OUTPATIENT)
Dept: PRIMARY CARE CLINIC | Age: 75
End: 2020-01-23

## 2020-02-25 ENCOUNTER — OFFICE VISIT (OUTPATIENT)
Dept: ORTHOPEDIC SURGERY | Age: 75
End: 2020-02-25
Payer: MEDICARE

## 2020-02-25 VITALS — RESPIRATION RATE: 12 BRPM | BODY MASS INDEX: 46.37 KG/M2 | HEIGHT: 64 IN | WEIGHT: 271.61 LBS

## 2020-02-25 PROCEDURE — 3052F HG A1C>EQUAL 8.0%<EQUAL 9.0%: CPT | Performed by: PODIATRIST

## 2020-02-25 PROCEDURE — 99202 OFFICE O/P NEW SF 15 MIN: CPT | Performed by: PODIATRIST

## 2020-02-25 PROCEDURE — 11721 DEBRIDE NAIL 6 OR MORE: CPT | Performed by: PODIATRIST

## 2020-02-27 ENCOUNTER — CARE COORDINATION (OUTPATIENT)
Dept: CARE COORDINATION | Age: 75
End: 2020-02-27

## 2020-02-27 NOTE — LETTER
3/4/2020    [address]    Dear Jonathon Guevara    Please disregard this letter if we have already spoken. My name is Pastor Durbin and I wanted to take this opportunity to introduce myself to you. I am the Nurse Care Manager who works with Jacinto Boas, MD.    On behalf of Jacinto Boas, MD, I am contacting you for the added support of Care Coordination. Care Coordination is a FREE service, offering extended care team support to you, for a duration of approximately 3-6 months. As your Care Manager, I will work with you to help achieve the health care goals you and Jacinto Boas, MD have established. If there exists any barriers, as your nurse Care Manager, I will be available to help address any barriers or resource needs as we work together towards your goals. If we have not yet spoken, we will likely soon be speaking over or the phone or perhaps when you are in to see your primary care provider Jacinto Boas, MD.    This will allow you opportunity to discuss your focused health care/lifestyle goals and let us know of any identified barriers or challenges, as well. I look forward to working together! Thanks & Be Well!             Oren Solorzano RN,  [facility] (KGWOQTQ) [phone]; Hernandez Richardson) [phone]; (LIVQECPY) [phone]  Cell  [phone]

## 2020-03-10 ENCOUNTER — OFFICE VISIT (OUTPATIENT)
Dept: PRIMARY CARE CLINIC | Age: 75
End: 2020-03-10
Payer: MEDICARE

## 2020-03-10 VITALS
TEMPERATURE: 98.2 F | BODY MASS INDEX: 46.92 KG/M2 | WEIGHT: 274.8 LBS | HEIGHT: 64 IN | SYSTOLIC BLOOD PRESSURE: 148 MMHG | DIASTOLIC BLOOD PRESSURE: 64 MMHG

## 2020-03-10 PROCEDURE — 99214 OFFICE O/P EST MOD 30 MIN: CPT | Performed by: FAMILY MEDICINE

## 2020-03-10 PROCEDURE — 3052F HG A1C>EQUAL 8.0%<EQUAL 9.0%: CPT | Performed by: FAMILY MEDICINE

## 2020-03-10 ASSESSMENT — ENCOUNTER SYMPTOMS
NAUSEA: 0
CONSTIPATION: 0
BACK PAIN: 0
WHEEZING: 0
COUGH: 0
BLOOD IN STOOL: 0
RHINORRHEA: 0
SHORTNESS OF BREATH: 0
VOMITING: 0
SINUS PAIN: 0
SORE THROAT: 0
ABDOMINAL PAIN: 0
CHEST TIGHTNESS: 0
DIARRHEA: 0
SINUS PRESSURE: 0

## 2020-03-10 NOTE — PROGRESS NOTES
Chief Complaint   Patient presents with    3 Month Follow-Up     nothing bothering her just knee pain. ongoing. HPI:  Erin Bautista is a 76 y.o. (: 1945) here today for 3 month follow up of diabetes. DMT2  Rx: Humalin N 45 units in AM and Humalin R 25   Pt denied any hypoglycemia episodes nor symptoms suggestive of hypoglycemia which include profuse sweating, anxiousness, light headedness. Check BG at home: yes  Brought logs:  AM: 150s  PM: 200s  Last hemoglobin A1c on 2020 was 6.6%; at goal  Lab Results   Component Value Date    CREATININE 1.2 2019     Lab Results   Component Value Date    LABMICR YES 2019   Pt is on Statin. Not taking ACEi or ARB. Saw podiatrist for foot exam.    Still waiting on diabetic shoes--> has appointment to get measurements done. HTN  Rx: No medication  Does not check BP consistently  Brought logs: States it is normal at home  Lab Results   Component Value Date    CREATININE 1.2 2019       The 10-year ASCVD risk score (Fran Freeman, et al., 2013) is: 32.5%    Values used to calculate the score:      Age: 76 years      Sex: Female      Is Non- : No      Diabetic: Yes      Tobacco smoker: No      Systolic Blood Pressure: 636 mmHg      Is BP treated: No      HDL Cholesterol: 53 mg/dL      Total Cholesterol: 167 mg/dL    Discussed weight increase. States that fast food restaurants do not have any healthy options. Discussed all of the healthy options that fast food restaurant do offer like side salad sensitive [place] fries. Fruits instead of [place] fries. Grilled chicken instead of breaded chicken. She states that this would not fill her up. Review of Systems   Constitutional: Negative for activity change, appetite change, chills, fatigue, fever and unexpected weight change. HENT: Negative for congestion, postnasal drip, rhinorrhea, sinus pressure, sinus pain, sneezing and sore throat.     Eyes: Negative for visual the muscle once for 1 dose. Use as directed for allergic reaction (Patient not taking: Reported on 1/22/2020) 0.3 mL 2    [DISCONTINUED] sitagliptan (JANUVIA) 100 MG tablet Take 1 tablet by mouth daily for 360 days. 30 tablet 3     No current facility-administered medications on file prior to visit. Allergies   Allergen Reactions    Adhesive Tape     Aleve [Naproxen Sodium]      Possible angioedema    Cozaar [Losartan Potassium]      angioedema    Iodine     Lasix [Furosemide] Swelling    Lisinopril      Cough and sore throat     Metformin And Related      Possible angioedema    Nifedipine      swells    Norvasc [Amlodipine Besylate]      Leg swelling    Novolog [Insulin Aspart]      dizzyness    Pioglitazone      edema    Zoloft [Sertraline Hcl]      Vision loss       OBJECTIVE:  BP (!) 148/64   Temp 98.2 °F (36.8 °C) (Oral)   Ht 5' 4\" (1.626 m)   Wt 274 lb 12.8 oz (124.6 kg)   LMP  (LMP Unknown)   BMI 47.17 kg/m²   BP Readings from Last 2 Encounters:   03/10/20 (!) 148/64   01/22/20 (!) 151/52     Wt Readings from Last 3 Encounters:   03/10/20 274 lb 12.8 oz (124.6 kg)   02/25/20 271 lb 9.7 oz (123.2 kg)   01/22/20 271 lb 9.6 oz (123.2 kg)       Physical Exam  Vitals signs reviewed. Constitutional:       General: She is not in acute distress. Appearance: Normal appearance. She is obese. She is not ill-appearing. HENT:      Head: Normocephalic and atraumatic. Right Ear: External ear normal.      Left Ear: External ear normal.      Nose: Nose normal. No congestion. Mouth/Throat:      Mouth: Mucous membranes are moist.      Pharynx: Oropharynx is clear. No oropharyngeal exudate. Eyes:      Extraocular Movements: Extraocular movements intact. Conjunctiva/sclera: Conjunctivae normal.      Pupils: Pupils are equal, round, and reactive to light. Neck:      Musculoskeletal: Normal range of motion and neck supple.    Cardiovascular:      Rate and Rhythm: Normal rate and 03/27/2018    HDL 40 09/25/2017     Lab Results   Component Value Date    LDLCALC 84 10/22/2019    LDLCALC 75 03/27/2018    LDLCALC 74 09/25/2017     Lab Results   Component Value Date    LABVLDL 30 10/22/2019    LABVLDL 31 03/27/2018    LABVLDL 34 09/25/2017     Lab Results   Component Value Date    LABA1C 8.8 01/22/2020         ASSESSMENT/PLAN:  1. Uncontrolled type 2 diabetes mellitus with complication, with long-term current use of insulin ([facility] [address]  We will recheck A1c next month; at goal per last months check at 6.6%  Continue regimen. Refill test strips. Following with podiatrist for foot checks. - blood glucose test strips (ONE TOUCH ULTRA TEST) strip; Apply 1 each topically 2 times daily As needed. Dispense: 100 strip; Refill: 3    2. Essential hypertension  Elevated but at goal at home per patient  Stable and will continue to monitor. 3. Hyperlipidemia, unspecified hyperlipidemia type  Continue Zocor 20 mg daily. We will discuss increasing this to high intensity dose at next visit    4. Class 3 severe obesity due to excess calories without serious comorbidity with body mass index (BMI) of 45.0 to 49.9 in [facility])  Discussed dietary modifications that may be made but patient does not seem to have insight into this. Discussed use, benefit, and side effects of prescribed medications. Barriers to medication compliance addressed. All patient questions answered. Pt voiced understanding. RTC No follow-ups on file.     Future Appointments   Date Time Provider Fitz Gregg   3/12/2020 10:30 AM [facility] [employer]   4/22/2020  8:30 AM MD MAYRA Sosa   6/20/2020  9:00 AM BENITO Montemayor AND RAFI Toro MD  3/10/2020  10:21 AM

## 2020-04-08 ENCOUNTER — TELEPHONE (OUTPATIENT)
Dept: PRIMARY CARE CLINIC | Age: 75
End: 2020-04-08

## 2020-04-17 NOTE — PROGRESS NOTES
Patient had a positive cologuard test.     Please call patient and have her schedule at the GI office I referred her to. 1. Positive colorectal cancer screening using Cologuard test  - [facility] - Holly Lawson MD, Gastroenterology, [facility]    Thank you!     Dr. Roxana Samayoa

## 2020-04-22 NOTE — PROGRESS NOTES
Alejandra Harman is a 76 y.o. female evaluated via telephone on 4/23/2020. Consent:  She and/or health care decision maker is aware that that she may receive a bill for this telephone service, depending on her insurance coverage, and has provided verbal consent to proceed: Yes    Documentation:  I communicated with the patient and/or health care decision maker about cologuard results and DM    Details of this discussion including any medical advice provided:     1. Positive colorectal cancer screening using Cologuard test  Discussed results and referral info. Has scheduled an appt for scope in June 2020.    2. Uncontrolled type 2 diabetes mellitus with complication, with long-term current use of insulin (HCC)  DMT2  Rx: Humalin N 45 units in AM and Humalin R 25   Pt denied any hypoglycemia episodes nor symptoms suggestive of hypoglycemia which include profuse sweating, anxiousness, light headedness. Check BG at home: yes  AM: 130s  PM: 300s  Lab Results   Component Value Date    LABA1C 8.8 01/22/2020     Lab Results   Component Value Date    CREATININE 1.2 12/03/2019     Lab Results   Component Value Date    LABMICR YES 11/30/2019   Pt is on Statin. Not taking ACEi or ARB. Sees a podiatrist for foot exams. Still waiting on diabetic shoes--> has appointment to get measurements done. Current regimen and recheck A1c in office at next visit. I affirm this is a Patient Initiated Episode with an Established Patient who has not had a related appointment within my department in the past 7 days or scheduled within the next 24 hours.     Total Time: minutes: 11-20 minutes    Note: not billable if this call serves to triage the patient into an appointment for the relevant concern      Ranulfo Gordon

## 2020-04-23 ENCOUNTER — VIRTUAL VISIT (OUTPATIENT)
Dept: PRIMARY CARE CLINIC | Age: 75
End: 2020-04-23
Payer: MEDICARE

## 2020-04-23 PROCEDURE — 3052F HG A1C>EQUAL 8.0%<EQUAL 9.0%: CPT | Performed by: FAMILY MEDICINE

## 2020-04-23 PROCEDURE — G2012 BRIEF CHECK IN BY MD/QHP: HCPCS | Performed by: FAMILY MEDICINE

## 2020-04-23 PROCEDURE — 99213 OFFICE O/P EST LOW 20 MIN: CPT | Performed by: FAMILY MEDICINE

## 2020-05-27 ENCOUNTER — TELEPHONE (OUTPATIENT)
Dept: FAMILY MEDICINE CLINIC | Age: 75
End: 2020-05-27

## 2020-06-07 RX ORDER — LABETALOL 100 MG/1
TABLET, FILM COATED ORAL
Qty: 180 TABLET | Refills: 0 | Status: SHIPPED | OUTPATIENT
Start: 2020-06-07 | End: 2020-09-08

## 2020-09-08 RX ORDER — LABETALOL 100 MG/1
TABLET, FILM COATED ORAL
Qty: 180 TABLET | Refills: 0 | Status: SHIPPED | OUTPATIENT
Start: 2020-09-08 | End: 2020-12-06

## 2020-10-12 ENCOUNTER — NURSE TRIAGE (OUTPATIENT)
Dept: OTHER | Facility: CLINIC | Age: 75
End: 2020-10-12

## 2020-10-12 ENCOUNTER — TELEPHONE (OUTPATIENT)
Dept: PRIMARY CARE CLINIC | Age: 75
End: 2020-10-12

## 2020-10-12 RX ORDER — CLINDAMYCIN HYDROCHLORIDE 300 MG/1
300 CAPSULE ORAL 4 TIMES DAILY
Qty: 40 CAPSULE | Refills: 0 | Status: SHIPPED | OUTPATIENT
Start: 2020-10-12 | End: 2020-10-22

## 2020-10-12 NOTE — TELEPHONE ENCOUNTER
This is a high risk patient given her co morbidities. She should be seen at urgent care or in office ASAP if she has a purulent wound. Please call her to let her know that an antibiotic has been called in but that she needs to be evaluated as well. She may need IV antibiotics. 1. Cellulitis of toe, unspecified laterality  - clindamycin (CLEOCIN) 300 MG capsule; Take 1 capsule by mouth 4 times daily for 10 days  Dispense: 40 capsule;  Refill: 0    Dr. Claudine Wheeler

## 2020-10-13 ENCOUNTER — TELEPHONE (OUTPATIENT)
Dept: PRIMARY CARE CLINIC | Age: 75
End: 2020-10-13

## 2020-10-14 NOTE — TELEPHONE ENCOUNTER
----- Message from Cb Sellers sent at 10/13/2020  5:03 PM EDT -----  Subject: Referral Request    QUESTIONS   Reason for referral request? patient has bunion and small wound on her   foot. patient would like to see a podiatrist.   Has the physician seen you for this condition before? No   Preferred Specialist (if applicable)? Do you already have an appointment scheduled? No  Additional Information for Provider? additional phone number for call back   . [phone]  ---------------------------------------------------------------------------  --------------  Monika Meskylar WEBBER  What is the best way for the office to contact you? OK to leave message on   voicemail  Preferred Call Back Phone Number?  [phone]

## 2020-10-20 ENCOUNTER — OFFICE VISIT (OUTPATIENT)
Dept: ORTHOPEDIC SURGERY | Age: 75
End: 2020-10-20

## 2020-10-20 VITALS — WEIGHT: 274 LBS | HEIGHT: 64 IN | BODY MASS INDEX: 46.78 KG/M2

## 2020-10-20 NOTE — PROGRESS NOTES
is absent to a 5.07 g monofilament and multiple plantar sides of both feet, bilateral.      X-RAYS:  3 weightbearing views of the right foot were obtained. Extensive osseous destruction is noted of the fifth metatarsal head and neck region. No soft tissue gas is noted. Dislocation of the fifth MTP is noted. ASSESSMENT:  NIDDM with peripheral neuropathy, diabetic foot ulcer - right foot. Osteomyelitis right foot. PLAN:  I educated the patient on the pathology and its treatment options. I reviewed the x-rays with the patient. Unfortunately, it does appear that osteomyelitis is an issue and she will most likely require resection of the infected bone. I would like to control the soft tissue infection before proceeding with that. Unna's boot was applied to the right lower leg. I will see her back on Thursday at the [facility] office to have that removed. A postop shoe was applied to the patient's right foot. The patient was instructed to remove the postop shoe prior to driving. The postop shoe is to be replaced before the patient if he gets out of the car upon completion of driving. All weightbearing activity is to be performed in the postop shoe with the exception of driving. Overall activity is to be decreased. A diabetic foot exam was performed. This exam revealed loss of protective sensation to both feet. This finding coupled with the presence of foot ulceration classifies the patient as a higher risk diabetic foot. The ulcer was debrided using a #15 blade down to good bleeding tissue. A mildly compressive dressing was applied with Neosporin ointment. Local wound care was discussed and prescribed. I discussed the recalcitrant nature of diabetic foot ulcers to treatment and the oftentimes extended nature of treatment. We discussed several different treatment options. Diabetic foot education was discussed. Overall activity will have to be decreased.   I will follow up on Thursday. Procedures    Darco Post-op Shoe Brace     Patient was prescribed a Darco Post-Op Shoe. The right foot will require stabilization / immobilization from this semi-rigid / rigid orthosis to improve their function. The orthosis will assist in protecting the affected area, provide functional support and facilitate healing. Patient was instructed to progress ambulation weight bearing as tolerated in the device. The patient was educated and fit by a healthcare professional with expert knowledge and specialization in brace application while under the direct supervision of the treating physician. Verbal and written instructions for the use of and application of this item were provided. They were instructed to contact the office immediately should the brace result in increased pain, decreased sensation, increased swelling or worsening of the condition.

## 2020-10-22 ENCOUNTER — OFFICE VISIT (OUTPATIENT)
Dept: ORTHOPEDIC SURGERY | Age: 75
End: 2020-10-22
Payer: MEDICARE

## 2020-10-22 ENCOUNTER — TELEPHONE (OUTPATIENT)
Dept: PRIMARY CARE CLINIC | Age: 75
End: 2020-10-22

## 2020-10-22 VITALS — TEMPERATURE: 98.2 F | HEIGHT: 64 IN | BODY MASS INDEX: 46.78 KG/M2 | WEIGHT: 274 LBS

## 2020-10-22 PROCEDURE — 3052F HG A1C>EQUAL 8.0%<EQUAL 9.0%: CPT | Performed by: PODIATRIST

## 2020-10-22 PROCEDURE — 99213 OFFICE O/P EST LOW 20 MIN: CPT | Performed by: PODIATRIST

## 2020-10-22 RX ORDER — CLINDAMYCIN HYDROCHLORIDE 300 MG/1
300 CAPSULE ORAL 3 TIMES DAILY
Qty: 30 CAPSULE | Refills: 0 | Status: SHIPPED | OUTPATIENT
Start: 2020-10-22 | End: 2020-10-31 | Stop reason: SDUPTHER

## 2020-10-22 NOTE — TELEPHONE ENCOUNTER
I spoke to Dr. Delmer White regarding patient. She has osteomyelitis and he plans to take her to the operating room Friday, October 30 for fifth metatarsal resection. She needs a preoperative exam Monday or Tuesday at the latest and she needs to get a Covid test.  I placed the order for the Covid test.    Please have them schedule an appointment for that test to be done tomorrow. Also please facilitate them getting a preop appointment Monday or Tuesday. [place] [address] testing sites (call for an appointment [phone] and realize hours have been changing): Only accepting [facility] patients, employees or patients scheduled for surgery/procedure. COVID testing locations and hours   [address] Phone: [phone] Monday - Friday 8:00 am - 1:00 pm  [facility] ([address] [facility], [address] [facility])  Phone: [phone]  Monday - Friday 8:00 am - 1:00 pm  [facility]  - [address], [address] [facility], [address])   Phone:  [phone]  Monday - Friday 8:00 am - 1:00 pm  [facility]  ([address], [address])  Phone:  [phone]   Monday - Friday 8:00 am - 3:00 pm            Here is a link to the most up to date and accurate information on the topic:    [email or web address].[email or web address]. html

## 2020-10-26 ENCOUNTER — OFFICE VISIT (OUTPATIENT)
Dept: PRIMARY CARE CLINIC | Age: 75
End: 2020-10-26
Payer: MEDICARE

## 2020-10-26 ENCOUNTER — TELEPHONE (OUTPATIENT)
Dept: ORTHOPEDIC SURGERY | Age: 75
End: 2020-10-26

## 2020-10-26 LAB — SARS-COV-2, PCR: NOT DETECTED

## 2020-10-26 PROCEDURE — 99211 OFF/OP EST MAY X REQ PHY/QHP: CPT | Performed by: NURSE PRACTITIONER

## 2020-10-27 ENCOUNTER — TELEPHONE (OUTPATIENT)
Dept: ORTHOPEDIC SURGERY | Age: 75
End: 2020-10-27

## 2020-10-27 RX ORDER — ACETAMINOPHEN 500 MG
500 TABLET ORAL EVERY 6 HOURS PRN
COMMUNITY

## 2020-10-27 NOTE — PROGRESS NOTES
Monna Clarissa    Age 76 y.o.    female    1945    N [id number]    10/30/2020  Arrival Time_____________  OR Time____________[address]     Procedure(s):  PARTIAL AMPUTATION 5TH RAY RIGHT FOOT                      Monitor Anesthesia Care    Surgeon(s):  Dino Rausch, DPM       Phone [phone] (home) [phone] (work)    [address] [facility] [place]  Cell Work  _________________________________________________________________  _________________________________________________________________  _________________________________________________________________  _________________________________________________________________  _________________________________________________________________      PCP _____________________________ Phone_________________       H&P__________________Bringing    Chart            Epic  DOS     Called_______  EKG__________________Bringing    Chart            Epic  DOS     Called_______  LAB__________________ Bringing    Chart            Epic  DOS     Called_______  Cardiac Clearance_______Bringing    Chart            Epic      DOS       Called_______    Cardiologist________________________ Phone___________________________      ? Druze concerns / Waiver on Chart            PAT Communications_____________  ? Pre-op Instructions Given [place]          ______________________________  ? Directions to Surgery Center                          ______________________________  ? Transportation Home_______________      _______________________________  ?  Crutches/Walker__________________        _______________________________      ________Pre-op Orders   _______Transcribed    _______Wt.  ________Pharmacy          _______SCD  ______VTE     ______Beta Blocker  ________Consent             ________TED [place]

## 2020-10-27 NOTE — RESULT ENCOUNTER NOTE

## 2020-10-27 NOTE — PROGRESS NOTES
Obstructive Sleep Apnea (TAHMINA) Screening     Patient:  Yosi Bellamy    YOB: 1945      Medical Record #:  [id number]                     Date:  10/27/2020     1. Are you a loud and/or regular snorer? [x]  Yes       [] No    2. Have you been observed to gasp or stop breathing during sleep? []  Yes       [x] No    3. Do you feel tired or groggy upon awakening or do you awaken with a headache?           []  Yes       [x] No    4. Are you often tired or fatigued during the wake time hours? [x]  Yes       [] No    5. Do you fall asleep sitting, reading, watching TV or driving? []  Yes       [x] No    6. Do you often have problems with memory or concentration? []  Yes       [x] No    **If patient's score is ? 3 they are considered high risk for TAHMINA. An Anesthesia provider will evaluate the patient and develop a plan of care the day of surgery. Note:  If the patient's BMI is more than 35 kg m¯² , has neck circumference > 40 cm, and/or high blood pressure the risk is greater (© American Sleep Apnea Association, 2006).

## 2020-10-28 ENCOUNTER — OFFICE VISIT (OUTPATIENT)
Dept: PRIMARY CARE CLINIC | Age: 75
End: 2020-10-28
Payer: MEDICARE

## 2020-10-28 VITALS
HEART RATE: 71 BPM | SYSTOLIC BLOOD PRESSURE: 141 MMHG | WEIGHT: 256.6 LBS | TEMPERATURE: 97.3 F | BODY MASS INDEX: 44.05 KG/M2 | DIASTOLIC BLOOD PRESSURE: 71 MMHG | RESPIRATION RATE: 16 BRPM

## 2020-10-28 DIAGNOSIS — Z01.818 PREOP EXAMINATION: ICD-10-CM

## 2020-10-28 LAB
A/G RATIO: 0.7 (ref 1.1–2.2)
ALBUMIN SERPL-MCNC: 2.8 G/DL (ref 3.4–5)
ALP BLD-CCNC: 63 U/L (ref 40–129)
ALT SERPL-CCNC: 9 U/L (ref 10–40)
ANION GAP SERPL CALCULATED.3IONS-SCNC: 7 MMOL/L (ref 3–16)
APTT: 29 SEC (ref 24.2–36.2)
AST SERPL-CCNC: 11 U/L (ref 15–37)
BASOPHILS ABSOLUTE: 0 K/UL (ref 0–0.2)
BASOPHILS RELATIVE PERCENT: 0.6 %
BILIRUB SERPL-MCNC: <0.2 MG/DL (ref 0–1)
BUN BLDV-MCNC: 9 MG/DL (ref 7–20)
CALCIUM SERPL-MCNC: 8.8 MG/DL (ref 8.3–10.6)
CHLORIDE BLD-SCNC: 105 MMOL/L (ref 99–110)
CO2: 29 MMOL/L (ref 21–32)
CREAT SERPL-MCNC: 0.7 MG/DL (ref 0.6–1.2)
EOSINOPHILS ABSOLUTE: 0.2 K/UL (ref 0–0.6)
EOSINOPHILS RELATIVE PERCENT: 2.7 %
ESTIMATED AVERAGE GLUCOSE: 243.2 MG/DL
GFR AFRICAN AMERICAN: >60
GFR NON-AFRICAN AMERICAN: >60
GLOBULIN: 3.9 G/DL
GLUCOSE BLD-MCNC: 69 MG/DL (ref 70–99)
HBA1C MFR BLD: 10.1 %
HCT VFR BLD CALC: 28.7 % (ref 36–48)
HEMOGLOBIN: 9 G/DL (ref 12–16)
INR BLD: 1.11 (ref 0.86–1.14)
LYMPHOCYTES ABSOLUTE: 0.8 K/UL (ref 1–5.1)
LYMPHOCYTES RELATIVE PERCENT: 11.4 %
MCH RBC QN AUTO: 25.5 PG (ref 26–34)
MCHC RBC AUTO-ENTMCNC: 31.2 G/DL (ref 31–36)
MCV RBC AUTO: 81.6 FL (ref 80–100)
MONOCYTES ABSOLUTE: 0.5 K/UL (ref 0–1.3)
MONOCYTES RELATIVE PERCENT: 6.9 %
NEUTROPHILS ABSOLUTE: 5.7 K/UL (ref 1.7–7.7)
NEUTROPHILS RELATIVE PERCENT: 78.4 %
PDW BLD-RTO: 18.7 % (ref 12.4–15.4)
PLATELET # BLD: 365 K/UL (ref 135–450)
PMV BLD AUTO: 7.8 FL (ref 5–10.5)
POTASSIUM SERPL-SCNC: 4 MMOL/L (ref 3.5–5.1)
PROTHROMBIN TIME: 12.9 SEC (ref 10–13.2)
RBC # BLD: 3.52 M/UL (ref 4–5.2)
SODIUM BLD-SCNC: 141 MMOL/L (ref 136–145)
TOTAL PROTEIN: 6.7 G/DL (ref 6.4–8.2)
WBC # BLD: 7.3 K/UL (ref 4–11)

## 2020-10-28 PROCEDURE — 90653 IIV ADJUVANT VACCINE IM: CPT | Performed by: FAMILY MEDICINE

## 2020-10-28 PROCEDURE — 93000 ELECTROCARDIOGRAM COMPLETE: CPT | Performed by: FAMILY MEDICINE

## 2020-10-28 PROCEDURE — G0008 ADMIN INFLUENZA VIRUS VAC: HCPCS | Performed by: FAMILY MEDICINE

## 2020-10-28 PROCEDURE — 99212 OFFICE O/P EST SF 10 MIN: CPT | Performed by: FAMILY MEDICINE

## 2020-10-28 NOTE — PROGRESS NOTES
Chaperone for Intimate Exam   Chaperone was offered as part of the rooming process. Patient declined and agrees to continue with exam without a chaperone.

## 2020-10-28 NOTE — PROGRESS NOTES
TOUCH ULTRA TEST) strip Apply 1 each topically 2 times daily As needed. 100 strip 3    insulin NPH (HUMULIN N) 100 UNIT/ML injection vial 45 units in the am and 25 units HS 2 vial 3    simvastatin (ZOCOR) 20 MG tablet TAKE ONE TABLET BY MOUTH ONCE NIGHTLY 90 tablet 1    Handicap Placard MISC by Does not apply route Duration 5 years 1 each 0    Insulin Syringe-Needle U-100 (BD INSULIN SYRINGE U/F) 31G X 5/16\" 0.5 ML MISC USE ONE SYRINGE FOUR TIMES A  each 5    ONETOUCH DELICA LANCETS 89Q MISC USE TO TEST BLOOD SUGAR THREE TIMES A  each 0    Blood Pressure KIT Apply 1 applicator topically 2 times daily 1 kit 0    blood glucose monitor strips Test 3-4 times a day & as needed for symptoms of irregular blood glucose. DX: E11.9. Supply the strips that go with her meter and approved by insurance 300 strip 1    Lancets MISC 1 each by Does not apply route daily Test 3-4 times a day & as needed for symptoms of irregular blood glucose. DX: E11.9. Supply the lancets that go with her meter and approved by insurance 300 each 3    Continuous Blood Gluc Sensor (FREESTYLE VÍCTOR 14 DAY SENSOR) [facility] CHECK GLUCOSE 4 TIMES DAILY 2 each 2    B-D ULTRAFINE III SHORT PEN 31G X 8 MM MISC USE WITH INSULIN TWO TIMES A DAY AS DIRECTED 100 each 2    glucose monitoring kit (FREESTYLE) monitoring kit Use a ONE TOUCH 1 kit 0    EPINEPHrine (EPIPEN 2-OSMAR) 0.3 MG/0.3ML SOAJ injection Inject 0.3 mLs into the muscle once for 1 dose. Use as directed for allergic reaction (Patient not taking: Reported on 1/22/2020) 0.3 mL 2     No current facility-administered medications for this visit.         Allergies   Allergen Reactions    Latex Rash    Adhesive Tape     Aleve [Naproxen Sodium]      Possible angioedema    Cozaar [Losartan Potassium]      angioedema    Iodine     Lasix [Furosemide] Swelling    Lisinopril      Cough and sore throat     Metformin And Related      Possible angioedema    Nifedipine      swells    Norvasc [Amlodipine Besylate]      Leg swelling    Novolog [Insulin Aspart]      dizzyness    Pioglitazone      edema    Zoloft [Sertraline Hcl]      Vision loss       Social History     Tobacco Use    Smoking status: Former Smoker     Packs/day: 0.25     Years: 38.00     Pack years: 9.50     Types: Cigarettes     Start date: 10/22/1958     Last attempt to quit: 1980     Years since quittin.8    Smokeless tobacco: Never Used   Substance Use Topics    Alcohol use: Yes     Alcohol/week: 1.0 standard drinks     Types: 1 Cans of beer per week     Comment: occasional     Drug use: Never        Family History   Problem Relation Age of Onset    Diabetes Father     Heart Disease Father     COPD Sister     Alzheimer's Disease Mother     Cancer Brother         lungs    Diabetes Sister     Stroke Sister     Cancer Sister         anal        Review Of Systems  Skin: abnormal pigmentation and erythema on both lower extremities with dry skin and scaling. No masses. Swelling present. Eyes: negative  Ears/Nose/Throat: negative  Respiratory: negative  Cardiovascular: negative  Gastrointestinal: negative  Genitourinary: negative  Musculoskeletal: negative  Neurologic: negative  Psychiatric: negative  Hematologic/Lymphatic/Immunologic: negative  Endocrine: negative       Objective:      BP (!) 141/71   Pulse 71   Temp 97.3 °F (36.3 °C) (Temporal)   Resp 16   Wt 256 lb 9.6 oz (116.4 kg)   LMP  (LMP Unknown)   Breastfeeding No   BMI 44.05 kg/m²   General appearance - healthy, alert, no distress  Skin - BL LE erythema, pitting edema and mild TTP. Head - Normocephalic. No masses, lesions, tenderness or abnormalities  Eyes - conjunctivae/corneas clear. PERRL, EOM's intact. Ears - External ears normal.   Nose/Sinuses - Nares normal. Mucosa normal. No drainage   Oropharynx - MMM, pink and patent  Neck - Neck supple. No adenopathy. Back - Back symmetric, no curvature. ROM normal. No CVA tenderness.   Lungs - Good diaphragmatic excursion. Lungs clear to ausc BL  Heart - Regular rate and rhythm, with no rub, murmur or gallop noted. Abdomen - Abdomen soft, non-tender. BS normal.   Extremities - Extremities normal. No deformities, edema, or skin discoloration  Musculoskeletal - spont ROM normal in all extremities. Muscular strength intact. Peripheral pulses - radial=3/4, dorsalis pedis= difficult to palpate due to swelling. Right foot is wrapped in gauze and ace wrap  Neuro - Gait aided by cane. EKG: SR, possible RBBB with changes that reflext LVH, no acute ST changes, there are no previous tracings available for comparison. Assessment and Plan:   Asher Michele is a low risk for major cardiac complications during a intermediate risk procedure and may continue as planned per operating surgeon. See also orders filed with this encounter, if any. Specific medication recommendations are above. Medications recommended to continue should be taken with a sip of water even when NPO. Changed dressing on right foot. Diagnosis Orders   1. Preop examination  EKG 12 lead    CBC Auto Differential    Comprehensive Metabolic Panel    APTT    PROTIME-INR    HEMOGLOBIN A1C       She is at low risk for cardiac event during surgery and anesthesia. No future appointments.      Lisa Allen MD  10/28/2020  10:08 AM

## 2020-10-29 ENCOUNTER — ANESTHESIA EVENT (OUTPATIENT)
Dept: OPERATING ROOM | Age: 75
End: 2020-10-29
Payer: MEDICARE

## 2020-10-29 NOTE — ANESTHESIA PRE PROCEDURE
Department of Anesthesiology  Preprocedure Note       Name:  Bridgett Smith   Age:  76 y.o.  :  1945                                          MRN:  [id number]         Date:  10/29/2020      Surgeon:  Arnel Jeffers DPM    Procedure:  PARTIAL AMPUTATION 5TH RAY RIGHT FOOT    HPI:  This is a 75 y/o female with IDDM with peripheral neuropathy, diabetic foot ulcer of the right foot. EKG:  10/28/2020 Sinus  Rhythm 73; Right bundle branch block with left axis; bifascicular block; voltage criteria for LVH  w/o ST/T abnormality may be normal    Medications prior to admission:   Multiple Vitamin (MULTIVITAMIN PO) Take by mouth daily   acetaminophen (TYLENOL) 500 MG tablet Take 500 mg by mouth every 6 hours as needed for Pain   clindamycin (CLEOCIN) 300 MG capsule Take 1 capsule by mouth 3 times daily  Patient taking differently: Take 300 mg by mouth 4 times daily    labetalol (NORMODYNE) 100 MG tablet TAKE ONE TABLET BY MOUTH TWICE A DAY   insulin regular (HUMULIN R) 100 UNIT/ML injection INJECT 45 UNITS INTO THE ARM UNDER THE SKIN AT LUNCH AND THEN INJECT 25 UNITS UNDER THE SKIN AT BEDTIME  Patient taking differently: Sliding scale   insulin NPH (HUMULIN N) 100 UNIT/ML injection vial 45 units in the am and 25 units HS   simvastatin (ZOCOR) 20 MG tablet TAKE ONE TABLET BY MOUTH ONCE NIGHTLY   B-D ULTRAFINE III SHORT PEN 31G X 8 MM MISC USE WITH INSULIN TWO TIMES A DAY AS DIRECTED   EPINEPHrine (EPIPEN 2-OSMAR)   injection Inject 0.3 mLs into the muscle once for 1 dose. Use as directed for allergic reaction; Patient not taking: Reported on 2020   sitagliptan (JANUVIA) 100 MG tablet Take 1 tablet by mouth daily for 360 days.      Allergies:     Latex Rash    Adhesive Tape     Aleve [Naproxen Sodium]      Possible angioedema    Cozaar [Losartan Potassium]      angioedema    Iodine     Lasix [Furosemide] Swelling    Lisinopril      Cough and sore throat     Metformin And Related      Possible angioedema    Nifedipine      swells    Norvasc [Amlodipine Besylate]      Leg swelling    Novolog [Insulin Aspart]      dizzyness    Pioglitazone      edema    Zoloft [Sertraline Hcl]      Vision loss     Problem List:     Hyperlipidemia    HTN (hypertension)    Uncontrolled type 2 diabetes mellitus with complication, with long-term current use of insulin (HCC)    Angioedema    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)    Hypoxia    Acute pulmonary embolism (HCC)    Dyspnea on exertion    Calcification of gallbladder     Past Medical History:     Acute respiratory failure with hypoxia ([address] 2019    Hx of blood clots 2019    PE    Hyperlipidemia     Hypertension     Mallet finger 3/4/2015    PONV (postoperative nausea and vomiting)     Type II or unspecified type diabetes mellitus without mention of complication, not stated as uncontrolled      Past Surgical History:    Procedure Laterality Date    BREAST BIOPSY Right      Social History:     Smoking status: Former Smoker     Packs/day: 0.25     Years: 38.00     Pack years: 9.50     Types: Cigarettes     Start date: 10/22/1958     Last attempt to quit: 1980     Years since quittin.8    Smokeless tobacco: Never Used   Substance Use Topics    Alcohol use:  Yes     Alcohol/week: 1.0 standard drinks     Types: 1 Cans of beer per week     Comment: occasional      Vital Signs (Current):    BP: 154/66  Pulse: 82    Resp: 20  SpO2: 95    Temp: 97.2 °F (36.2 °C)    Height: 5' 4\" (1.626 m)  (10/30/20)  Weight: 267 lb (121.1 kg)  (10/30/20)    BMI: 45.9            BP Readings from Last 3 Encounters:   10/28/20 (!) 141/71   03/10/20 (!) 148/64   20 (!) 151/52     NPO Status: >8 hrs    BMI:   Wt Readings from Last 3 Encounters:   10/28/20 256 lb 9.6 oz (116.4 kg) 10/22/20 274 lb (124.3 kg)   10/20/20 274 lb (124.3 kg)     Body mass index is 47.03 kg/m². FBS: 78 (NB: PATIENT TOOK HER INSULIN LAST NIGHT AND FBS LOW-> D50 1 AMP ADDED TO LR INFUSION)    CBC:    WBC 7.3 10/28/2020    HGB 9.0 10/28/2020    HCT 28.7 10/28/2020     10/28/2020     CMP:     10/28/2020    K 4.0 10/28/2020     10/28/2020    CO2 29 10/28/2020    BUN 9 10/28/2020    CREATININE 0.7 10/28/2020    GLUCOSE 69 10/28/2020    PROT 6.7 10/28/2020    CALCIUM 8.8 10/28/2020    BILITOT <0.2 10/28/2020    ALKPHOS 63 10/28/2020    AST 11 10/28/2020    ALT 9 10/28/2020     Coags:    PROTIME 12.9 10/28/2020    INR 1.11 10/28/2020    APTT 29.0 10/28/2020     COVID-19 Screening (If Applicable):   Lab Results   Component Value Date    COVID19 Not Detected 10/26/2020     Anesthesia Evaluation  Patient summary reviewed and Nursing notes reviewed   history of anesthetic complications: PONV. Airway: Mallampati: III  TM distance: >3 FB   Neck ROM: full  Comment: Thick neck girth  Mouth opening: > = 3 FB Dental:          Pulmonary: breath sounds clear to auscultation  (+) current smoker    (-) asthma, recent URI, sleep apnea and wheezes                           Cardiovascular:  Exercise tolerance: good (>4 METS),   (+) hypertension:, BAEZ: after ambulating 2 flights of stairs,     (-) past MI, CABG/stent,  angina and murmur    ECG reviewed  Rhythm: regular  Rate: normal                    Neuro/Psych:      (-) seizures, TIA and CVA           GI/Hepatic/Renal:   (+) morbid obesity     (-) GERD, hepatitis, liver disease and no renal disease       Endo/Other:    (+) DiabetesType II DM, , : arthritis:., .    (-) hypothyroidism               Abdominal:   (+) obese,         Vascular:   + DVT, . Anesthesia Plan      MAC, general and TIVA     ASA 3     (MAC/TIVA-> GA/LMA if needed  If needed post op, can place PF block.)  Induction: intravenous.     MIPS: Prophylactic antiemetics administered. Anesthetic plan and risks discussed with patient. Plan discussed with CRNA.         Ryan Valle MD

## 2020-10-30 ENCOUNTER — HOSPITAL ENCOUNTER (OUTPATIENT)
Age: 75
Setting detail: OUTPATIENT SURGERY
Discharge: HOME | End: 2020-10-30
Attending: PODIATRIST | Admitting: PODIATRIST
Payer: MEDICARE

## 2020-10-30 ENCOUNTER — ANESTHESIA (OUTPATIENT)
Dept: OPERATING ROOM | Age: 75
End: 2020-10-30
Payer: MEDICARE

## 2020-10-30 VITALS
HEART RATE: 76 BPM | WEIGHT: 267 LBS | RESPIRATION RATE: 18 BRPM | SYSTOLIC BLOOD PRESSURE: 143 MMHG | DIASTOLIC BLOOD PRESSURE: 52 MMHG | TEMPERATURE: 98 F | HEIGHT: 64 IN | OXYGEN SATURATION: 95 % | BODY MASS INDEX: 45.58 KG/M2

## 2020-10-30 VITALS
SYSTOLIC BLOOD PRESSURE: 143 MMHG | DIASTOLIC BLOOD PRESSURE: 67 MMHG | RESPIRATION RATE: 1 BRPM | OXYGEN SATURATION: 97 %

## 2020-10-30 LAB
GLUCOSE BLD-MCNC: 105 MG/DL (ref 70–99)
GLUCOSE BLD-MCNC: 110 MG/DL (ref 70–99)
GLUCOSE BLD-MCNC: 72 MG/DL (ref 70–99)
PERFORMED ON: ABNORMAL
PERFORMED ON: ABNORMAL
PERFORMED ON: NORMAL

## 2020-10-30 PROCEDURE — 3700000001 HC ADD 15 MINUTES (ANESTHESIA): Performed by: PODIATRIST

## 2020-10-30 PROCEDURE — 2580000003 HC RX 258: Performed by: ANESTHESIOLOGY

## 2020-10-30 PROCEDURE — 88305 TISSUE EXAM BY PATHOLOGIST: CPT

## 2020-10-30 PROCEDURE — 3700000000 HC ANESTHESIA ATTENDED CARE: Performed by: PODIATRIST

## 2020-10-30 PROCEDURE — 2580000003 HC RX 258: Performed by: PODIATRIST

## 2020-10-30 PROCEDURE — 7100000011 HC PHASE II RECOVERY - ADDTL 15 MIN: Performed by: PODIATRIST

## 2020-10-30 PROCEDURE — 2580000003 HC RX 258

## 2020-10-30 PROCEDURE — 2500000003 HC RX 250 WO HCPCS: Performed by: PODIATRIST

## 2020-10-30 PROCEDURE — 7100000000 HC PACU RECOVERY - FIRST 15 MIN: Performed by: PODIATRIST

## 2020-10-30 PROCEDURE — 6360000002 HC RX W HCPCS: Performed by: NURSE ANESTHETIST, CERTIFIED REGISTERED

## 2020-10-30 PROCEDURE — 88311 DECALCIFY TISSUE: CPT

## 2020-10-30 PROCEDURE — 2709999900 HC NON-CHARGEABLE SUPPLY: Performed by: PODIATRIST

## 2020-10-30 PROCEDURE — 3600000013 HC SURGERY LEVEL 3 ADDTL 15MIN: Performed by: PODIATRIST

## 2020-10-30 PROCEDURE — 3600000003 HC SURGERY LEVEL 3 BASE: Performed by: PODIATRIST

## 2020-10-30 PROCEDURE — 7100000010 HC PHASE II RECOVERY - FIRST 15 MIN: Performed by: PODIATRIST

## 2020-10-30 RX ORDER — PROPOFOL 10 MG/ML
INJECTION, EMULSION INTRAVENOUS PRN
Status: DISCONTINUED | OUTPATIENT
Start: 2020-10-30 | End: 2020-10-30 | Stop reason: SDUPTHER

## 2020-10-30 RX ORDER — HYDROCODONE BITARTRATE AND ACETAMINOPHEN 5; 325 MG/1; MG/1
1 TABLET ORAL EVERY 6 HOURS PRN
Qty: 16 TABLET | Refills: 0 | Status: SHIPPED | OUTPATIENT
Start: 2020-10-30 | End: 2020-11-03

## 2020-10-30 RX ORDER — DEXTROSE MONOHYDRATE 25 G/50ML
25 INJECTION, SOLUTION INTRAVENOUS ONCE
Status: COMPLETED | OUTPATIENT
Start: 2020-10-30 | End: 2020-10-30

## 2020-10-30 RX ORDER — BUPIVACAINE HYDROCHLORIDE 5 MG/ML
INJECTION, SOLUTION EPIDURAL; INTRACAUDAL PRN
Status: DISCONTINUED | OUTPATIENT
Start: 2020-10-30 | End: 2020-10-30 | Stop reason: ALTCHOICE

## 2020-10-30 RX ORDER — FENTANYL CITRATE 50 UG/ML
INJECTION, SOLUTION INTRAMUSCULAR; INTRAVENOUS PRN
Status: DISCONTINUED | OUTPATIENT
Start: 2020-10-30 | End: 2020-10-30 | Stop reason: SDUPTHER

## 2020-10-30 RX ORDER — SODIUM CHLORIDE 0.9 % (FLUSH) 0.9 %
10 SYRINGE (ML) INJECTION PRN
Status: DISCONTINUED | OUTPATIENT
Start: 2020-10-30 | End: 2020-10-30 | Stop reason: HOSPADM

## 2020-10-30 RX ORDER — HYDRALAZINE HYDROCHLORIDE 20 MG/ML
5 INJECTION INTRAMUSCULAR; INTRAVENOUS EVERY 10 MIN PRN
Status: DISCONTINUED | OUTPATIENT
Start: 2020-10-30 | End: 2020-10-30 | Stop reason: HOSPADM

## 2020-10-30 RX ORDER — PROMETHAZINE HYDROCHLORIDE 25 MG/ML
6.25 INJECTION, SOLUTION INTRAMUSCULAR; INTRAVENOUS
Status: DISCONTINUED | OUTPATIENT
Start: 2020-10-30 | End: 2020-10-30 | Stop reason: HOSPADM

## 2020-10-30 RX ORDER — FENTANYL CITRATE 50 UG/ML
25 INJECTION, SOLUTION INTRAMUSCULAR; INTRAVENOUS EVERY 5 MIN PRN
Status: DISCONTINUED | OUTPATIENT
Start: 2020-10-30 | End: 2020-10-30 | Stop reason: HOSPADM

## 2020-10-30 RX ORDER — ONDANSETRON 2 MG/ML
4 INJECTION INTRAMUSCULAR; INTRAVENOUS
Status: DISCONTINUED | OUTPATIENT
Start: 2020-10-30 | End: 2020-10-30 | Stop reason: HOSPADM

## 2020-10-30 RX ORDER — MIDAZOLAM HYDROCHLORIDE 1 MG/ML
INJECTION INTRAMUSCULAR; INTRAVENOUS PRN
Status: DISCONTINUED | OUTPATIENT
Start: 2020-10-30 | End: 2020-10-30 | Stop reason: SDUPTHER

## 2020-10-30 RX ORDER — OXYCODONE HYDROCHLORIDE AND ACETAMINOPHEN 5; 325 MG/1; MG/1
2 TABLET ORAL PRN
Status: DISCONTINUED | OUTPATIENT
Start: 2020-10-30 | End: 2020-10-30 | Stop reason: HOSPADM

## 2020-10-30 RX ORDER — LIDOCAINE HYDROCHLORIDE 10 MG/ML
0.3 INJECTION, SOLUTION EPIDURAL; INFILTRATION; INTRACAUDAL; PERINEURAL
Status: DISCONTINUED | OUTPATIENT
Start: 2020-10-30 | End: 2020-10-30 | Stop reason: HOSPADM

## 2020-10-30 RX ORDER — UBIDECARENONE 75 MG
50 CAPSULE ORAL DAILY
COMMUNITY

## 2020-10-30 RX ORDER — SODIUM CHLORIDE 0.9 % (FLUSH) 0.9 %
10 SYRINGE (ML) INJECTION EVERY 12 HOURS SCHEDULED
Status: DISCONTINUED | OUTPATIENT
Start: 2020-10-30 | End: 2020-10-30 | Stop reason: HOSPADM

## 2020-10-30 RX ORDER — DEXTROSE MONOHYDRATE 25 G/50ML
INJECTION, SOLUTION INTRAVENOUS
Status: COMPLETED
Start: 2020-10-30 | End: 2020-10-30

## 2020-10-30 RX ORDER — MAGNESIUM HYDROXIDE 1200 MG/15ML
LIQUID ORAL CONTINUOUS PRN
Status: COMPLETED | OUTPATIENT
Start: 2020-10-30 | End: 2020-10-30

## 2020-10-30 RX ORDER — MEPERIDINE HYDROCHLORIDE 50 MG/ML
12.5 INJECTION INTRAMUSCULAR; INTRAVENOUS; SUBCUTANEOUS EVERY 5 MIN PRN
Status: DISCONTINUED | OUTPATIENT
Start: 2020-10-30 | End: 2020-10-30 | Stop reason: HOSPADM

## 2020-10-30 RX ORDER — SODIUM CHLORIDE, SODIUM LACTATE, POTASSIUM CHLORIDE, CALCIUM CHLORIDE 600; 310; 30; 20 MG/100ML; MG/100ML; MG/100ML; MG/100ML
INJECTION, SOLUTION INTRAVENOUS CONTINUOUS
Status: DISCONTINUED | OUTPATIENT
Start: 2020-10-30 | End: 2020-10-30 | Stop reason: HOSPADM

## 2020-10-30 RX ORDER — OXYCODONE HYDROCHLORIDE AND ACETAMINOPHEN 5; 325 MG/1; MG/1
1 TABLET ORAL PRN
Status: DISCONTINUED | OUTPATIENT
Start: 2020-10-30 | End: 2020-10-30 | Stop reason: HOSPADM

## 2020-10-30 RX ADMIN — FENTANYL CITRATE 25 MCG: 50 INJECTION INTRAMUSCULAR; INTRAVENOUS at 13:41

## 2020-10-30 RX ADMIN — PROPOFOL 50 MG: 10 INJECTION, EMULSION INTRAVENOUS at 13:49

## 2020-10-30 RX ADMIN — PROPOFOL 50 MG: 10 INJECTION, EMULSION INTRAVENOUS at 13:41

## 2020-10-30 RX ADMIN — DEXTROSE MONOHYDRATE 25 G: 25 INJECTION, SOLUTION INTRAVENOUS at 12:05

## 2020-10-30 RX ADMIN — DEXTROSE MONOHYDRATE 600 MG: 50 INJECTION, SOLUTION INTRAVENOUS at 13:28

## 2020-10-30 RX ADMIN — MIDAZOLAM HYDROCHLORIDE 1 MG: 2 INJECTION, SOLUTION INTRAMUSCULAR; INTRAVENOUS at 13:41

## 2020-10-30 RX ADMIN — SODIUM CHLORIDE, POTASSIUM CHLORIDE, SODIUM LACTATE AND CALCIUM CHLORIDE: 600; 310; 30; 20 INJECTION, SOLUTION INTRAVENOUS at 11:59

## 2020-10-30 RX ADMIN — PROPOFOL 50 MG: 10 INJECTION, EMULSION INTRAVENOUS at 13:55

## 2020-10-30 RX ADMIN — MIDAZOLAM HYDROCHLORIDE 1 MG: 2 INJECTION, SOLUTION INTRAMUSCULAR; INTRAVENOUS at 13:55

## 2020-10-30 RX ADMIN — PROPOFOL 50 MG: 10 INJECTION, EMULSION INTRAVENOUS at 13:36

## 2020-10-30 RX ADMIN — PROPOFOL 20 MG: 10 INJECTION, EMULSION INTRAVENOUS at 14:02

## 2020-10-30 ASSESSMENT — PULMONARY FUNCTION TESTS
PIF_VALUE: 4
PIF_VALUE: 1
PIF_VALUE: 0
PIF_VALUE: 4
PIF_VALUE: 0
PIF_VALUE: 2
PIF_VALUE: 3
PIF_VALUE: 0
PIF_VALUE: 0
PIF_VALUE: 4
PIF_VALUE: 1
PIF_VALUE: 4
PIF_VALUE: 2
PIF_VALUE: 1
PIF_VALUE: 1
PIF_VALUE: 0
PIF_VALUE: 3
PIF_VALUE: 0
PIF_VALUE: 5
PIF_VALUE: 0
PIF_VALUE: 0
PIF_VALUE: 3
PIF_VALUE: 1
PIF_VALUE: 0
PIF_VALUE: 1
PIF_VALUE: 1
PIF_VALUE: 0
PIF_VALUE: 3
PIF_VALUE: 0
PIF_VALUE: 0
PIF_VALUE: 2
PIF_VALUE: 1
PIF_VALUE: 0
PIF_VALUE: 1
PIF_VALUE: 0
PIF_VALUE: 1
PIF_VALUE: 0
PIF_VALUE: 0
PIF_VALUE: 1
PIF_VALUE: 0
PIF_VALUE: 0
PIF_VALUE: 5
PIF_VALUE: 4
PIF_VALUE: 1
PIF_VALUE: 1

## 2020-10-30 ASSESSMENT — ENCOUNTER SYMPTOMS: DYSPNEA ACTIVITY LEVEL: AFTER AMBULATING 2 FLIGHTS OF STAIRS

## 2020-10-30 ASSESSMENT — LIFESTYLE VARIABLES: SMOKING_STATUS: 1

## 2020-10-30 ASSESSMENT — PAIN SCALES - GENERAL
PAINLEVEL_OUTOF10: 0
PAINLEVEL_OUTOF10: 0

## 2020-10-30 ASSESSMENT — PAIN - FUNCTIONAL ASSESSMENT: PAIN_FUNCTIONAL_ASSESSMENT: 0-10

## 2020-10-30 NOTE — OP NOTE
[facility]                 [place]                                 OPERATIVE REPORT    PATIENT NAME: Angelica Portillo                       :        1945  MED REC NO:   [id number]                          ROOM:  ACCOUNT NO:   [de-identified]                           ADMIT DATE: 10/30/2020  PROVIDER:     Gloria Godinez DPM    DATE OF PROCEDURE:  10/30/2020    PREOPERATIVE DIAGNOSES:  1. Diabetic foot ulcer, right foot. 2.  Osteomyelitis, fifth metatarsal, right foot. POSTOPERATIVE DIAGNOSES: same. PROCEDURE:  Partial fifth ray amputation, right foot. SURGEON:  Gloria Godinez DPM    ANESTHESIA USED:  MAC with local.    OPERATIVE INDICATIONS:  This patient presented to my outpatient clinic a  couple of weeks ago with a longstanding diabetic foot ulcer. She had  recently developed increased pain, swelling and drainage from the right  foot. Subsequent x-rays demonstrated extensive osteolysis of the distal  portion of the right fifth metatarsal.  The decision for partial  amputation was then made. OPERATIVE REPORT:  The patient was brought to the operating room and  placed in supine position with an IV intact for sedation. MAC  anesthesia was delivered by the Anesthesia Department of [facility]. The patient's right foot was anesthetized using 10 mL  of 0.5% bupivacaine plain via a ring block around the fifth metatarsal.   The patient's right foot was then prepped and draped in the usual  aseptic manner. A pneumatic tourniquet was secured to the right calf;  however, this was not inflated. The right lower leg was then prepped  and draped in the usual aseptic manner. Attention was then directed to the dorsal aspect of the right forefoot  where a small ulcer was noted around the fifth metatarsal head. She had  severe edema to the lateral aspect of the right forefoot with mild deep  tendon rubor.   No ascending cellulitis was noted. There was no purulent  drainage. A linear incision was created at the base of the fifth metatarsal and  extended distally on the dorsal aspect of the foot. This was converted  into a racquet-shaped incision around the base of the fifth digit. The  fifth digit was then disarticulated at the fifth MTP and removed in its  entirety. This was sent as a single specimen. The remainder of the  wound was explored and no purulent pocket was noted. A periosteal incision was made proximally along the course of the fifth  metatarsal to reveal the shaft of the bone. A sagittal saw was then  used to resect the distal half of the fifth metatarsal back to what was  suspected to be healthy bone. The distal aspect of the fifth metatarsal  was sent as a separate specimen. Next a small clinic fragment in the proximal aspect of the fifth  metatarsal was obtained using a sagittal saw. It was noted that the  proximal aspect of the fifth metatarsal was normal in shape and  hardness. The wound was then copiously irrigated using 2000 mL of normal saline  using a power lavage system. The periosteal tissue was then gently  reapproximated using 2-0 Vicryl. The skin was then gently  reapproximated under minimal tension using 3-0 nylon. A mildly  compressive dressing was applied to the patient's right foot. There were no complications encountered during the procedure. All  materials and injectables are as above. Specimens sent to Pathology  included three different specimens. The estimated blood loss for the  procedure was less than 50 mL.         Bianca Chen DPM    D: 10/30/2020 14:21:32       T: 10/30/2020 16:39:47     [email or web address]  Job#: [id number]     Doc#: [id number]    CC:

## 2020-10-30 NOTE — ANESTHESIA POSTPROCEDURE EVALUATION
Department of Anesthesiology  Postprocedure Note    Patient: Luann Hamilton  MRN: [id number]  [place]: 1945  Date of evaluation: 10/30/2020    Procedure Summary     Date:  10/30/20 Room / Location:  [address] Bren [facility]ard  / [facility]    Anesthesia Start:  5888 Anesthesia Stop:  9699    Procedure:  PARTIAL AMPUTATION 5TH RAY RIGHT FOOT (Right Toes) Diagnosis:       Diabetic foot ulcer with osteomyelitis ([address]      Ulcer of right foot, unspecified ulcer stage ([address]      (DIABETIC ULCER RIGHT FOOT)    Surgeon:  Martín Shea DPM Responsible Provider:  Rosa Mojica MD    Anesthesia Type:  MAC, general, TIVA ASA Status:  3        Anesthesia Type: MAC, general, TIVA    Meghan Phase I: Meghan Score: 10    Meghan Phase II: Meghan Score: 10    Last vitals: Reviewed and per EMR flowsheets.      Anesthesia Post Evaluation   Anesthetic Problems: no   Cardiovascular System Stable: yes  Respiratory Function: Airway Patent yes  ETT no  Ventilator no  Level of consciousness: awake, alert and oriented  Post-op pain: adequate analgesia  Hydration Adequate: yes  Nausea/Vomiting:no  Other Issues:     Anh Tran MD

## 2020-10-30 NOTE — PROGRESS NOTES
POCT      Blood Glucose:  72@ 1150      105 @ 1217   After some of IV with D50      (Normal Range 70-99)    PT:      (Normal Range 9.8 - 13)    INR:      (Normal Range 0.86-1.14)

## 2020-10-30 NOTE — H&P
I examined the patient. I reviewed the patient's pre-op history and physical, no changes are noted.     Blaise Salmon DPM

## 2020-10-30 NOTE — BRIEF OP NOTE
Brief Postoperative Note      Patient: Sherry Lord  YOB: 1945  MRN: [id number]    Date of Procedure: 10/30/2020    Pre-Op Diagnosis: DIABETIC ULCER RIGHT FOOT, OSTEOMYELITIS 5TH METATARSAL, RIGHT FOOT    Post-Op Diagnosis: Same       Procedure(s):  PARTIAL AMPUTATION 5TH RAY RIGHT FOOT    Surgeon(s):  Joann Valencia DPM    Assistant:  Surgical Assistant: Lary Soliz    Anesthesia: Monitor Anesthesia Care    Estimated Blood Loss (mL): less than 50     Complications: None    Specimens:   ID Type Source Tests Collected by Time Destination   A : fifth digit bone and soft tissue right foot Tissue Tissue SURGICAL PATHOLOGY Joann Valencia DPM 10/30/2020 1403    B : distal fifth metatarsal right foot Tissue Tissue SURGICAL PATHOLOGY Joann Valencia DPM 10/30/2020 1404    C : Proximal fifth metatarsal right foot Tissue Tissue SURGICAL PATHOLOGY Joann Valencia DPM 10/30/2020 1406        Implants:  * No implants in log *      Drains: * No LDAs found *    Findings: Proximal aspect of the 5th metatarsal appeared to be of normal hardness and color. The patient bled well.     Electronically signed by Joann Valencia DPM on 10/30/2020 at 2:15 PM

## 2020-11-03 ENCOUNTER — OFFICE VISIT (OUTPATIENT)
Dept: ORTHOPEDIC SURGERY | Age: 75
End: 2020-11-03

## 2020-11-03 VITALS — WEIGHT: 257 LBS | HEIGHT: 64 IN | BODY MASS INDEX: 43.87 KG/M2

## 2020-11-03 PROCEDURE — 99024 POSTOP FOLLOW-UP VISIT: CPT | Performed by: PODIATRIST

## 2020-11-03 NOTE — PROGRESS NOTES
Initial postop check. She denies any fever chills or night sweats. She does however states that her blood sugars have spiked to over 300 a couple of times over the weekend. She is currently staying at a friend's house and home health nursing is coming out to change her dressings. There is mild edema to the right foot. There is just slight maceration in the central aspect of the surgical incision. This is otherwise well approximated and there is no active bleeding or drainage from the wound. Sutures are intact. 3 nonweightbearing x-ray views of the right foot were taken. These demonstrate resection of the fifth metatarsal and the fifth digit back to the proximal metaphyseal region of the metatarsal.  No acute osteomyelitis is noted and there is no soft tissue gas noted. Status post partial fifth ray amputation right foot x4 days    The postop dressing was changed.   She will follow up next week for suture removal.

## 2020-11-10 ENCOUNTER — OFFICE VISIT (OUTPATIENT)
Dept: ORTHOPEDIC SURGERY | Age: 75
End: 2020-11-10

## 2020-11-10 VITALS — BODY MASS INDEX: 43.87 KG/M2 | WEIGHT: 257 LBS | HEIGHT: 64 IN

## 2020-11-10 PROCEDURE — 99024 POSTOP FOLLOW-UP VISIT: CPT | Performed by: PODIATRIST

## 2020-11-10 NOTE — PROGRESS NOTES
Second postop visit. She denies any fever chills or night sweats. She states that she is not having any pain within her right foot. She has mild edema to the right forefoot. There is very slight wound dehiscence at the distal aspect of the incision. There are no surrounding signs of infection or ascending cellulitis. She has mild dependent rubor to the right foot but this readily blanches with palpation. There is no purulence or active bleeding noted. Status post partial fifth ray amputation, right foot x2 weeks    I will leave the sutures in for another week. She is performing the dressing changes herself now she will continue with a dry sterile dressing at all times. We will see her back in a week.

## 2020-11-17 ENCOUNTER — OFFICE VISIT (OUTPATIENT)
Dept: ORTHOPEDIC SURGERY | Age: 75
End: 2020-11-17
Payer: MEDICARE

## 2020-11-17 VITALS — WEIGHT: 257 LBS | BODY MASS INDEX: 43.87 KG/M2 | HEIGHT: 64 IN

## 2020-11-17 PROCEDURE — 99024 POSTOP FOLLOW-UP VISIT: CPT | Performed by: PODIATRIST

## 2020-11-17 PROCEDURE — 11042 DBRDMT SUBQ TIS 1ST 20SQCM/<: CPT | Performed by: PODIATRIST

## 2020-11-17 RX ORDER — MAG HYDROX/ALUMINUM HYD/SIMETH 400-400-40
SUSPENSION, ORAL (FINAL DOSE FORM) ORAL
Qty: 1 CAN | Refills: 0 | Status: SHIPPED | OUTPATIENT
Start: 2020-11-17

## 2020-11-24 ENCOUNTER — OFFICE VISIT (OUTPATIENT)
Dept: ORTHOPEDIC SURGERY | Age: 75
End: 2020-11-24

## 2020-11-24 VITALS — BODY MASS INDEX: 43.87 KG/M2 | HEIGHT: 64 IN | WEIGHT: 257 LBS

## 2020-11-24 PROCEDURE — 99024 POSTOP FOLLOW-UP VISIT: CPT | Performed by: PODIATRIST

## 2020-12-06 RX ORDER — LABETALOL 100 MG/1
TABLET, FILM COATED ORAL
Qty: 180 TABLET | Refills: 0 | Status: SHIPPED | OUTPATIENT
Start: 2020-12-06 | End: 2021-03-05

## 2020-12-06 RX ORDER — SIMVASTATIN 20 MG
TABLET ORAL
Qty: 90 TABLET | Refills: 0 | Status: SHIPPED | OUTPATIENT
Start: 2020-12-06 | End: 2021-03-05

## 2020-12-08 ENCOUNTER — OFFICE VISIT (OUTPATIENT)
Dept: ORTHOPEDIC SURGERY | Age: 75
End: 2020-12-08

## 2020-12-08 VITALS — WEIGHT: 257 LBS | BODY MASS INDEX: 43.87 KG/M2 | HEIGHT: 64 IN

## 2020-12-08 PROCEDURE — 99024 POSTOP FOLLOW-UP VISIT: CPT | Performed by: PODIATRIST

## 2020-12-08 NOTE — PROGRESS NOTES
6-week postop check. She missed last week because of the weather. She otherwise is not having any new problems. She denies having any pain to her right foot. The dehisced wound has not decreased in size to 2.0 cm x 0.1 cm with a depth of 0.1 cm. This has a pink granular base. No signs of infection are present. Status post partial fifth ray amputation, right foot x6 weeks    She will just use a simple Band-Aid with Neosporin. She will continue with the postop shoe. I will see her back in a week.

## 2020-12-15 ENCOUNTER — OFFICE VISIT (OUTPATIENT)
Dept: ORTHOPEDIC SURGERY | Age: 75
End: 2020-12-15

## 2020-12-15 VITALS — BODY MASS INDEX: 43.87 KG/M2 | HEIGHT: 64 IN | WEIGHT: 257 LBS

## 2020-12-15 PROCEDURE — 99024 POSTOP FOLLOW-UP VISIT: CPT | Performed by: PODIATRIST

## 2020-12-15 NOTE — PROGRESS NOTES
7-week postop check. No new problems. The wound dehiscence now measures 1.7 cm x 0.1 cm with a depth of 0.1 cm. No signs of infection present. Status post partial fifth ray amputation, right foot x7 weeks    She will continue with local wound care. I will see her back in a week.

## 2020-12-22 ENCOUNTER — OFFICE VISIT (OUTPATIENT)
Dept: ORTHOPEDIC SURGERY | Age: 75
End: 2020-12-22

## 2020-12-22 VITALS — BODY MASS INDEX: 26.8 KG/M2 | WEIGHT: 157 LBS | HEIGHT: 64 IN

## 2020-12-22 PROCEDURE — 99024 POSTOP FOLLOW-UP VISIT: CPT | Performed by: PODIATRIST

## 2020-12-29 ENCOUNTER — OFFICE VISIT (OUTPATIENT)
Dept: ORTHOPEDIC SURGERY | Age: 75
End: 2020-12-29

## 2020-12-29 VITALS — HEIGHT: 64 IN | BODY MASS INDEX: 43.87 KG/M2 | WEIGHT: 257 LBS

## 2020-12-29 PROCEDURE — 99024 POSTOP FOLLOW-UP VISIT: CPT | Performed by: PODIATRIST

## 2020-12-29 NOTE — PROGRESS NOTES
9-week postop check. No new complaints. The wound is now healed. No signs of infection are present on the right foot. Status post partial fifth ray amputation, right foot x9 weeks    She can discontinue the local wound care. She will monitor the foot for new problems. I will see her back as needed.

## 2021-03-05 DIAGNOSIS — E78.5 HYPERLIPIDEMIA, UNSPECIFIED HYPERLIPIDEMIA TYPE: ICD-10-CM

## 2021-03-05 RX ORDER — LABETALOL 100 MG/1
TABLET, FILM COATED ORAL
Qty: 180 TABLET | Refills: 0 | Status: SHIPPED | OUTPATIENT
Start: 2021-03-05 | End: 2021-06-10

## 2021-03-05 RX ORDER — SIMVASTATIN 20 MG
TABLET ORAL
Qty: 90 TABLET | Refills: 0 | Status: SHIPPED | OUTPATIENT
Start: 2021-03-05 | End: 2021-05-18

## 2021-03-08 RX ORDER — BLOOD SUGAR DIAGNOSTIC
STRIP MISCELLANEOUS
Qty: 100 STRIP | Refills: 2 | Status: SHIPPED | OUTPATIENT
Start: 2021-03-08

## 2021-04-06 ENCOUNTER — HOSPITAL ENCOUNTER (INPATIENT)
Age: 76
LOS: 7 days | Discharge: NURSING, REHAB OR LONG-TERM CARE | DRG: 616 | End: 2021-04-14
Attending: EMERGENCY MEDICINE | Admitting: INTERNAL MEDICINE
Payer: MEDICARE

## 2021-04-06 DIAGNOSIS — M86.079 ACUTE HEMATOGENOUS OSTEOMYELITIS OF FOOT, UNSPECIFIED LATERALITY (HCC): ICD-10-CM

## 2021-04-06 DIAGNOSIS — L03.90 SEPSIS DUE TO CELLULITIS (HCC): ICD-10-CM

## 2021-04-06 DIAGNOSIS — M86.172 OTHER ACUTE OSTEOMYELITIS OF LEFT FOOT (HCC): Primary | ICD-10-CM

## 2021-04-06 DIAGNOSIS — L97.529 DIABETIC ULCER OF LEFT FOOT ASSOCIATED WITH OTHER SPECIFIED DIABETES MELLITUS, UNSPECIFIED PART OF FOOT, UNSPECIFIED ULCER STAGE (HCC): ICD-10-CM

## 2021-04-06 DIAGNOSIS — E13.621 DIABETIC ULCER OF LEFT FOOT ASSOCIATED WITH OTHER SPECIFIED DIABETES MELLITUS, UNSPECIFIED PART OF FOOT, UNSPECIFIED ULCER STAGE (HCC): ICD-10-CM

## 2021-04-06 DIAGNOSIS — A41.9 SEPSIS DUE TO CELLULITIS (HCC): ICD-10-CM

## 2021-04-06 LAB
GLUCOSE BLD-MCNC: 139 MG/DL (ref 70–99)
PERFORMED ON: ABNORMAL

## 2021-04-06 PROCEDURE — 96375 TX/PRO/DX INJ NEW DRUG ADDON: CPT

## 2021-04-06 PROCEDURE — 96366 THER/PROPH/DIAG IV INF ADDON: CPT

## 2021-04-06 PROCEDURE — 96365 THER/PROPH/DIAG IV INF INIT: CPT

## 2021-04-06 PROCEDURE — 99283 EMERGENCY DEPT VISIT LOW MDM: CPT

## 2021-04-07 ENCOUNTER — APPOINTMENT (OUTPATIENT)
Dept: GENERAL RADIOLOGY | Age: 76
DRG: 616 | End: 2021-04-07
Payer: MEDICARE

## 2021-04-07 ENCOUNTER — ANESTHESIA (OUTPATIENT)
Dept: OPERATING ROOM | Age: 76
DRG: 616 | End: 2021-04-07
Payer: MEDICARE

## 2021-04-07 ENCOUNTER — ANESTHESIA EVENT (OUTPATIENT)
Dept: OPERATING ROOM | Age: 76
DRG: 616 | End: 2021-04-07
Payer: MEDICARE

## 2021-04-07 VITALS — TEMPERATURE: 98.6 F | OXYGEN SATURATION: 98 % | SYSTOLIC BLOOD PRESSURE: 136 MMHG | DIASTOLIC BLOOD PRESSURE: 64 MMHG

## 2021-04-07 PROBLEM — M86.9 OSTEOMYELITIS (HCC): Status: ACTIVE | Noted: 2021-04-07

## 2021-04-07 LAB
ABO/RH: NORMAL
ANION GAP SERPL CALCULATED.3IONS-SCNC: 10 MMOL/L (ref 3–16)
ANTIBODY SCREEN: NORMAL
BASOPHILS ABSOLUTE: 0 K/UL (ref 0–0.2)
BASOPHILS RELATIVE PERCENT: 0.4 %
BUN BLDV-MCNC: 19 MG/DL (ref 7–20)
C-REACTIVE PROTEIN: 194.8 MG/L (ref 0–5.1)
CALCIUM SERPL-MCNC: 8.7 MG/DL (ref 8.3–10.6)
CHLORIDE BLD-SCNC: 98 MMOL/L (ref 99–110)
CO2: 27 MMOL/L (ref 21–32)
CREAT SERPL-MCNC: 1 MG/DL (ref 0.6–1.2)
EKG ATRIAL RATE: 96 BPM
EKG DIAGNOSIS: NORMAL
EKG P AXIS: 23 DEGREES
EKG P-R INTERVAL: 140 MS
EKG Q-T INTERVAL: 386 MS
EKG QRS DURATION: 126 MS
EKG QTC CALCULATION (BAZETT): 487 MS
EKG R AXIS: -56 DEGREES
EKG T AXIS: 26 DEGREES
EKG VENTRICULAR RATE: 96 BPM
EOSINOPHILS ABSOLUTE: 0.1 K/UL (ref 0–0.6)
EOSINOPHILS RELATIVE PERCENT: 1.1 %
ESTIMATED AVERAGE GLUCOSE: 197.3 MG/DL
GFR AFRICAN AMERICAN: >60
GFR NON-AFRICAN AMERICAN: 54
GLUCOSE BLD-MCNC: 136 MG/DL (ref 70–99)
GLUCOSE BLD-MCNC: 182 MG/DL (ref 70–99)
GLUCOSE BLD-MCNC: 191 MG/DL (ref 70–99)
GLUCOSE BLD-MCNC: 192 MG/DL (ref 70–99)
GLUCOSE BLD-MCNC: 215 MG/DL (ref 70–99)
GLUCOSE BLD-MCNC: 387 MG/DL (ref 70–99)
HBA1C MFR BLD: 8.5 %
HCT VFR BLD CALC: 24.1 % (ref 36–48)
HCT VFR BLD CALC: 27 % (ref 36–48)
HEMOGLOBIN: 7.5 G/DL (ref 12–16)
HEMOGLOBIN: 8.6 G/DL (ref 12–16)
INR BLD: 1.08 (ref 0.86–1.14)
LACTIC ACID, SEPSIS: 1 MMOL/L (ref 0.4–1.9)
LACTIC ACID, SEPSIS: 1.2 MMOL/L (ref 0.4–1.9)
LYMPHOCYTES ABSOLUTE: 0.8 K/UL (ref 1–5.1)
LYMPHOCYTES RELATIVE PERCENT: 7.2 %
MCH RBC QN AUTO: 24.6 PG (ref 26–34)
MCHC RBC AUTO-ENTMCNC: 31.7 G/DL (ref 31–36)
MCV RBC AUTO: 77.6 FL (ref 80–100)
MONOCYTES ABSOLUTE: 0.8 K/UL (ref 0–1.3)
MONOCYTES RELATIVE PERCENT: 7.3 %
NEUTROPHILS ABSOLUTE: 9.5 K/UL (ref 1.7–7.7)
NEUTROPHILS RELATIVE PERCENT: 84 %
PDW BLD-RTO: 18.3 % (ref 12.4–15.4)
PERFORMED ON: ABNORMAL
PLATELET # BLD: 382 K/UL (ref 135–450)
PMV BLD AUTO: 7.7 FL (ref 5–10.5)
POTASSIUM REFLEX MAGNESIUM: 5.6 MMOL/L (ref 3.5–5.1)
POTASSIUM SERPL-SCNC: 4.6 MMOL/L (ref 3.5–5.1)
PREALBUMIN: 8.1 MG/DL (ref 20–40)
PROTHROMBIN TIME: 12.5 SEC (ref 10–13.2)
RBC # BLD: 3.48 M/UL (ref 4–5.2)
SEDIMENTATION RATE, ERYTHROCYTE: >120 MM/HR (ref 0–30)
SODIUM BLD-SCNC: 135 MMOL/L (ref 136–145)
WBC # BLD: 11.3 K/UL (ref 4–11)

## 2021-04-07 PROCEDURE — 87102 FUNGUS ISOLATION CULTURE: CPT

## 2021-04-07 PROCEDURE — 6370000000 HC RX 637 (ALT 250 FOR IP): Performed by: INTERNAL MEDICINE

## 2021-04-07 PROCEDURE — 36415 COLL VENOUS BLD VENIPUNCTURE: CPT

## 2021-04-07 PROCEDURE — 85025 COMPLETE CBC W/AUTO DIFF WBC: CPT

## 2021-04-07 PROCEDURE — 87015 SPECIMEN INFECT AGNT CONCNTJ: CPT

## 2021-04-07 PROCEDURE — 85014 HEMATOCRIT: CPT

## 2021-04-07 PROCEDURE — 6370000000 HC RX 637 (ALT 250 FOR IP): Performed by: PODIATRIST

## 2021-04-07 PROCEDURE — 1200000000 HC SEMI PRIVATE

## 2021-04-07 PROCEDURE — 85652 RBC SED RATE AUTOMATED: CPT

## 2021-04-07 PROCEDURE — 2580000003 HC RX 258: Performed by: STUDENT IN AN ORGANIZED HEALTH CARE EDUCATION/TRAINING PROGRAM

## 2021-04-07 PROCEDURE — 88311 DECALCIFY TISSUE: CPT

## 2021-04-07 PROCEDURE — 83036 HEMOGLOBIN GLYCOSYLATED A1C: CPT

## 2021-04-07 PROCEDURE — 84132 ASSAY OF SERUM POTASSIUM: CPT

## 2021-04-07 PROCEDURE — 86850 RBC ANTIBODY SCREEN: CPT

## 2021-04-07 PROCEDURE — 6370000000 HC RX 637 (ALT 250 FOR IP): Performed by: STUDENT IN AN ORGANIZED HEALTH CARE EDUCATION/TRAINING PROGRAM

## 2021-04-07 PROCEDURE — 87075 CULTR BACTERIA EXCEPT BLOOD: CPT

## 2021-04-07 PROCEDURE — 73630 X-RAY EXAM OF FOOT: CPT

## 2021-04-07 PROCEDURE — 3700000000 HC ANESTHESIA ATTENDED CARE: Performed by: PODIATRIST

## 2021-04-07 PROCEDURE — 83605 ASSAY OF LACTIC ACID: CPT

## 2021-04-07 PROCEDURE — 3700000001 HC ADD 15 MINUTES (ANESTHESIA): Performed by: PODIATRIST

## 2021-04-07 PROCEDURE — 7100000000 HC PACU RECOVERY - FIRST 15 MIN: Performed by: PODIATRIST

## 2021-04-07 PROCEDURE — 88304 TISSUE EXAM BY PATHOLOGIST: CPT

## 2021-04-07 PROCEDURE — 2580000003 HC RX 258: Performed by: INTERNAL MEDICINE

## 2021-04-07 PROCEDURE — P9016 RBC LEUKOCYTES REDUCED: HCPCS

## 2021-04-07 PROCEDURE — 2709999900 HC NON-CHARGEABLE SUPPLY: Performed by: PODIATRIST

## 2021-04-07 PROCEDURE — 85018 HEMOGLOBIN: CPT

## 2021-04-07 PROCEDURE — 2500000003 HC RX 250 WO HCPCS: Performed by: NURSE ANESTHETIST, CERTIFIED REGISTERED

## 2021-04-07 PROCEDURE — 86140 C-REACTIVE PROTEIN: CPT

## 2021-04-07 PROCEDURE — 93005 ELECTROCARDIOGRAM TRACING: CPT | Performed by: PODIATRIST

## 2021-04-07 PROCEDURE — 3600000012 HC SURGERY LEVEL 2 ADDTL 15MIN: Performed by: PODIATRIST

## 2021-04-07 PROCEDURE — 86901 BLOOD TYPING SEROLOGIC RH(D): CPT

## 2021-04-07 PROCEDURE — 7100000001 HC PACU RECOVERY - ADDTL 15 MIN: Performed by: PODIATRIST

## 2021-04-07 PROCEDURE — 87076 CULTURE ANAEROBE IDENT EACH: CPT

## 2021-04-07 PROCEDURE — 0QTP0ZZ RESECTION OF LEFT METATARSAL, OPEN APPROACH: ICD-10-PCS | Performed by: PODIATRIST

## 2021-04-07 PROCEDURE — 87186 SC STD MICRODIL/AGAR DIL: CPT

## 2021-04-07 PROCEDURE — 87116 MYCOBACTERIA CULTURE: CPT

## 2021-04-07 PROCEDURE — 86900 BLOOD TYPING SEROLOGIC ABO: CPT

## 2021-04-07 PROCEDURE — 87077 CULTURE AEROBIC IDENTIFY: CPT

## 2021-04-07 PROCEDURE — 87070 CULTURE OTHR SPECIMN AEROBIC: CPT

## 2021-04-07 PROCEDURE — 6360000002 HC RX W HCPCS: Performed by: INTERNAL MEDICINE

## 2021-04-07 PROCEDURE — 87206 SMEAR FLUORESCENT/ACID STAI: CPT

## 2021-04-07 PROCEDURE — 86923 COMPATIBILITY TEST ELECTRIC: CPT

## 2021-04-07 PROCEDURE — 87205 SMEAR GRAM STAIN: CPT

## 2021-04-07 PROCEDURE — 6360000002 HC RX W HCPCS: Performed by: STUDENT IN AN ORGANIZED HEALTH CARE EDUCATION/TRAINING PROGRAM

## 2021-04-07 PROCEDURE — 88305 TISSUE EXAM BY PATHOLOGIST: CPT

## 2021-04-07 PROCEDURE — 80048 BASIC METABOLIC PNL TOTAL CA: CPT

## 2021-04-07 PROCEDURE — 6360000002 HC RX W HCPCS: Performed by: PHYSICIAN ASSISTANT

## 2021-04-07 PROCEDURE — 85610 PROTHROMBIN TIME: CPT

## 2021-04-07 PROCEDURE — 3600000002 HC SURGERY LEVEL 2 BASE: Performed by: PODIATRIST

## 2021-04-07 PROCEDURE — 6360000002 HC RX W HCPCS: Performed by: NURSE ANESTHETIST, CERTIFIED REGISTERED

## 2021-04-07 PROCEDURE — 84134 ASSAY OF PREALBUMIN: CPT

## 2021-04-07 PROCEDURE — 87040 BLOOD CULTURE FOR BACTERIA: CPT

## 2021-04-07 PROCEDURE — 71045 X-RAY EXAM CHEST 1 VIEW: CPT

## 2021-04-07 PROCEDURE — 2580000003 HC RX 258: Performed by: PHYSICIAN ASSISTANT

## 2021-04-07 PROCEDURE — 2500000003 HC RX 250 WO HCPCS: Performed by: PODIATRIST

## 2021-04-07 RX ORDER — PROPOFOL 10 MG/ML
INJECTION, EMULSION INTRAVENOUS PRN
Status: DISCONTINUED | OUTPATIENT
Start: 2021-04-07 | End: 2021-04-07 | Stop reason: SDUPTHER

## 2021-04-07 RX ORDER — ONDANSETRON 2 MG/ML
4 INJECTION INTRAMUSCULAR; INTRAVENOUS EVERY 6 HOURS PRN
Status: DISCONTINUED | OUTPATIENT
Start: 2021-04-07 | End: 2021-04-14 | Stop reason: HOSPADM

## 2021-04-07 RX ORDER — SODIUM CHLORIDE 0.9 % (FLUSH) 0.9 %
5-40 SYRINGE (ML) INJECTION EVERY 12 HOURS SCHEDULED
Status: DISCONTINUED | OUTPATIENT
Start: 2021-04-07 | End: 2021-04-14 | Stop reason: HOSPADM

## 2021-04-07 RX ORDER — PROMETHAZINE HYDROCHLORIDE 25 MG/1
12.5 TABLET ORAL EVERY 6 HOURS PRN
Status: DISCONTINUED | OUTPATIENT
Start: 2021-04-07 | End: 2021-04-14 | Stop reason: HOSPADM

## 2021-04-07 RX ORDER — INSULIN LISPRO 100 [IU]/ML
0-12 INJECTION, SOLUTION INTRAVENOUS; SUBCUTANEOUS
Status: DISCONTINUED | OUTPATIENT
Start: 2021-04-07 | End: 2021-04-14

## 2021-04-07 RX ORDER — INSULIN LISPRO 100 [IU]/ML
0-6 INJECTION, SOLUTION INTRAVENOUS; SUBCUTANEOUS NIGHTLY
Status: DISCONTINUED | OUTPATIENT
Start: 2021-04-07 | End: 2021-04-14

## 2021-04-07 RX ORDER — SODIUM CHLORIDE 0.9 % (FLUSH) 0.9 %
5-40 SYRINGE (ML) INJECTION PRN
Status: DISCONTINUED | OUTPATIENT
Start: 2021-04-07 | End: 2021-04-14 | Stop reason: HOSPADM

## 2021-04-07 RX ORDER — HYDRALAZINE HYDROCHLORIDE 20 MG/ML
5 INJECTION INTRAMUSCULAR; INTRAVENOUS EVERY 5 MIN PRN
Status: DISCONTINUED | OUTPATIENT
Start: 2021-04-07 | End: 2021-04-07 | Stop reason: HOSPADM

## 2021-04-07 RX ORDER — SODIUM CHLORIDE 9 MG/ML
25 INJECTION, SOLUTION INTRAVENOUS PRN
Status: DISCONTINUED | OUTPATIENT
Start: 2021-04-07 | End: 2021-04-14 | Stop reason: HOSPADM

## 2021-04-07 RX ORDER — FENTANYL CITRATE 50 UG/ML
25 INJECTION, SOLUTION INTRAMUSCULAR; INTRAVENOUS EVERY 5 MIN PRN
Status: DISCONTINUED | OUTPATIENT
Start: 2021-04-07 | End: 2021-04-07 | Stop reason: HOSPADM

## 2021-04-07 RX ORDER — 0.9 % SODIUM CHLORIDE 0.9 %
1000 INTRAVENOUS SOLUTION INTRAVENOUS ONCE
Status: COMPLETED | OUTPATIENT
Start: 2021-04-07 | End: 2021-04-07

## 2021-04-07 RX ORDER — FENTANYL CITRATE 50 UG/ML
50 INJECTION, SOLUTION INTRAMUSCULAR; INTRAVENOUS EVERY 5 MIN PRN
Status: DISCONTINUED | OUTPATIENT
Start: 2021-04-07 | End: 2021-04-07 | Stop reason: HOSPADM

## 2021-04-07 RX ORDER — ATORVASTATIN CALCIUM 20 MG/1
20 TABLET, FILM COATED ORAL NIGHTLY
Refills: 0 | Status: DISCONTINUED | OUTPATIENT
Start: 2021-04-07 | End: 2021-04-14 | Stop reason: HOSPADM

## 2021-04-07 RX ORDER — BUPIVACAINE HYDROCHLORIDE 5 MG/ML
INJECTION, SOLUTION EPIDURAL; INTRACAUDAL PRN
Status: DISCONTINUED | OUTPATIENT
Start: 2021-04-07 | End: 2021-04-07 | Stop reason: ALTCHOICE

## 2021-04-07 RX ORDER — LABETALOL HYDROCHLORIDE 5 MG/ML
5 INJECTION, SOLUTION INTRAVENOUS EVERY 10 MIN PRN
Status: DISCONTINUED | OUTPATIENT
Start: 2021-04-07 | End: 2021-04-07 | Stop reason: HOSPADM

## 2021-04-07 RX ORDER — ACETAMINOPHEN 650 MG/1
650 SUPPOSITORY RECTAL EVERY 6 HOURS PRN
Status: DISCONTINUED | OUTPATIENT
Start: 2021-04-07 | End: 2021-04-14 | Stop reason: HOSPADM

## 2021-04-07 RX ORDER — OXYCODONE HYDROCHLORIDE 5 MG/1
5 TABLET ORAL EVERY 4 HOURS PRN
Status: DISCONTINUED | OUTPATIENT
Start: 2021-04-07 | End: 2021-04-14 | Stop reason: HOSPADM

## 2021-04-07 RX ORDER — SUCCINYLCHOLINE/SOD CL,ISO/PF 200MG/10ML
SYRINGE (ML) INTRAVENOUS PRN
Status: DISCONTINUED | OUTPATIENT
Start: 2021-04-07 | End: 2021-04-07 | Stop reason: SDUPTHER

## 2021-04-07 RX ORDER — POLYETHYLENE GLYCOL 3350 17 G/17G
17 POWDER, FOR SOLUTION ORAL DAILY PRN
Status: DISCONTINUED | OUTPATIENT
Start: 2021-04-07 | End: 2021-04-14 | Stop reason: HOSPADM

## 2021-04-07 RX ORDER — FENTANYL CITRATE 50 UG/ML
INJECTION, SOLUTION INTRAMUSCULAR; INTRAVENOUS PRN
Status: DISCONTINUED | OUTPATIENT
Start: 2021-04-07 | End: 2021-04-07 | Stop reason: SDUPTHER

## 2021-04-07 RX ORDER — LIDOCAINE HYDROCHLORIDE 20 MG/ML
INJECTION, SOLUTION INFILTRATION; PERINEURAL PRN
Status: DISCONTINUED | OUTPATIENT
Start: 2021-04-07 | End: 2021-04-07 | Stop reason: ALTCHOICE

## 2021-04-07 RX ORDER — OXYCODONE HYDROCHLORIDE 5 MG/1
10 TABLET ORAL EVERY 4 HOURS PRN
Status: DISCONTINUED | OUTPATIENT
Start: 2021-04-07 | End: 2021-04-14 | Stop reason: HOSPADM

## 2021-04-07 RX ORDER — SODIUM CHLORIDE, SODIUM LACTATE, POTASSIUM CHLORIDE, CALCIUM CHLORIDE 600; 310; 30; 20 MG/100ML; MG/100ML; MG/100ML; MG/100ML
INJECTION, SOLUTION INTRAVENOUS CONTINUOUS
Status: DISCONTINUED | OUTPATIENT
Start: 2021-04-07 | End: 2021-04-10

## 2021-04-07 RX ORDER — ONDANSETRON 2 MG/ML
4 INJECTION INTRAMUSCULAR; INTRAVENOUS
Status: DISCONTINUED | OUTPATIENT
Start: 2021-04-07 | End: 2021-04-07 | Stop reason: HOSPADM

## 2021-04-07 RX ORDER — ACETAMINOPHEN 325 MG/1
650 TABLET ORAL EVERY 6 HOURS PRN
Status: DISCONTINUED | OUTPATIENT
Start: 2021-04-07 | End: 2021-04-14 | Stop reason: HOSPADM

## 2021-04-07 RX ORDER — LIDOCAINE HYDROCHLORIDE 20 MG/ML
INJECTION, SOLUTION INTRAVENOUS PRN
Status: DISCONTINUED | OUTPATIENT
Start: 2021-04-07 | End: 2021-04-07 | Stop reason: SDUPTHER

## 2021-04-07 RX ORDER — ONDANSETRON 2 MG/ML
INJECTION INTRAMUSCULAR; INTRAVENOUS PRN
Status: DISCONTINUED | OUTPATIENT
Start: 2021-04-07 | End: 2021-04-07 | Stop reason: SDUPTHER

## 2021-04-07 RX ORDER — LABETALOL 100 MG/1
100 TABLET, FILM COATED ORAL 2 TIMES DAILY
Status: DISCONTINUED | OUTPATIENT
Start: 2021-04-07 | End: 2021-04-14 | Stop reason: HOSPADM

## 2021-04-07 RX ORDER — DEXAMETHASONE SODIUM PHOSPHATE 4 MG/ML
INJECTION, SOLUTION INTRA-ARTICULAR; INTRALESIONAL; INTRAMUSCULAR; INTRAVENOUS; SOFT TISSUE PRN
Status: DISCONTINUED | OUTPATIENT
Start: 2021-04-07 | End: 2021-04-07 | Stop reason: SDUPTHER

## 2021-04-07 RX ADMIN — INSULIN GLARGINE 30 UNITS: 100 INJECTION, SOLUTION SUBCUTANEOUS at 22:01

## 2021-04-07 RX ADMIN — ACETAMINOPHEN 650 MG: 325 TABLET ORAL at 10:09

## 2021-04-07 RX ADMIN — SODIUM CHLORIDE, SODIUM LACTATE, POTASSIUM CHLORIDE, AND CALCIUM CHLORIDE: .6; .31; .03; .02 INJECTION, SOLUTION INTRAVENOUS at 13:01

## 2021-04-07 RX ADMIN — SODIUM CHLORIDE 1000 ML: 0.9 INJECTION, SOLUTION INTRAVENOUS at 01:20

## 2021-04-07 RX ADMIN — PIPERACILLIN SODIUM AND TAZOBACTAM SODIUM 4500 MG: 4; .5 INJECTION, POWDER, LYOPHILIZED, FOR SOLUTION INTRAVENOUS at 01:00

## 2021-04-07 RX ADMIN — INSULIN LISPRO 5 UNITS: 100 INJECTION, SOLUTION INTRAVENOUS; SUBCUTANEOUS at 22:01

## 2021-04-07 RX ADMIN — LIDOCAINE HYDROCHLORIDE 70 MG: 20 INJECTION, SOLUTION INTRAVENOUS at 14:42

## 2021-04-07 RX ADMIN — PROPOFOL 140 MG: 10 INJECTION, EMULSION INTRAVENOUS at 14:42

## 2021-04-07 RX ADMIN — VANCOMYCIN HYDROCHLORIDE 2000 MG: 10 INJECTION, POWDER, LYOPHILIZED, FOR SOLUTION INTRAVENOUS at 02:30

## 2021-04-07 RX ADMIN — Medication 10 ML: at 19:53

## 2021-04-07 RX ADMIN — LABETALOL HYDROCHLORIDE 100 MG: 100 TABLET, FILM COATED ORAL at 18:28

## 2021-04-07 RX ADMIN — DEXAMETHASONE SODIUM PHOSPHATE 4 MG: 4 INJECTION, SOLUTION INTRAMUSCULAR; INTRAVENOUS at 14:48

## 2021-04-07 RX ADMIN — ATORVASTATIN CALCIUM 20 MG: 20 TABLET, FILM COATED ORAL at 19:51

## 2021-04-07 RX ADMIN — FENTANYL CITRATE 50 MCG: 50 INJECTION, SOLUTION INTRAMUSCULAR; INTRAVENOUS at 14:51

## 2021-04-07 RX ADMIN — VANCOMYCIN HYDROCHLORIDE 1250 MG: 10 INJECTION, POWDER, LYOPHILIZED, FOR SOLUTION INTRAVENOUS at 17:55

## 2021-04-07 RX ADMIN — INSULIN LISPRO 4 UNITS: 100 INJECTION, SOLUTION INTRAVENOUS; SUBCUTANEOUS at 18:23

## 2021-04-07 RX ADMIN — PIPERACILLIN AND TAZOBACTAM 3375 MG: 3; .375 INJECTION, POWDER, LYOPHILIZED, FOR SOLUTION INTRAVENOUS at 08:57

## 2021-04-07 RX ADMIN — OXYCODONE HYDROCHLORIDE 10 MG: 5 TABLET ORAL at 19:51

## 2021-04-07 RX ADMIN — ONDANSETRON 4 MG: 2 INJECTION INTRAMUSCULAR; INTRAVENOUS at 14:48

## 2021-04-07 RX ADMIN — FENTANYL CITRATE 50 MCG: 50 INJECTION, SOLUTION INTRAMUSCULAR; INTRAVENOUS at 14:39

## 2021-04-07 RX ADMIN — Medication 100 MG: at 14:42

## 2021-04-07 ASSESSMENT — PULMONARY FUNCTION TESTS
PIF_VALUE: 22
PIF_VALUE: 23
PIF_VALUE: 2
PIF_VALUE: 1
PIF_VALUE: 22
PIF_VALUE: 1
PIF_VALUE: 20
PIF_VALUE: 22
PIF_VALUE: 6
PIF_VALUE: 1
PIF_VALUE: 15
PIF_VALUE: 22
PIF_VALUE: 15
PIF_VALUE: 1
PIF_VALUE: 1
PIF_VALUE: 21
PIF_VALUE: 4
PIF_VALUE: 21
PIF_VALUE: 22
PIF_VALUE: 1
PIF_VALUE: 22
PIF_VALUE: 21
PIF_VALUE: 1
PIF_VALUE: 23
PIF_VALUE: 4
PIF_VALUE: 22
PIF_VALUE: 22
PIF_VALUE: 21
PIF_VALUE: 22
PIF_VALUE: 1
PIF_VALUE: 22
PIF_VALUE: 22
PIF_VALUE: 21
PIF_VALUE: 1
PIF_VALUE: 15
PIF_VALUE: 16

## 2021-04-07 ASSESSMENT — PAIN DESCRIPTION - ORIENTATION
ORIENTATION: LEFT;LOWER
ORIENTATION: LEFT
ORIENTATION: LEFT

## 2021-04-07 ASSESSMENT — ENCOUNTER SYMPTOMS
EYE REDNESS: 0
VOMITING: 0
COLOR CHANGE: 1
SORE THROAT: 0
DIARRHEA: 0
BACK PAIN: 0
SHORTNESS OF BREATH: 0
ABDOMINAL PAIN: 0
NAUSEA: 0

## 2021-04-07 ASSESSMENT — PAIN SCALES - GENERAL
PAINLEVEL_OUTOF10: 0
PAINLEVEL_OUTOF10: 0
PAINLEVEL_OUTOF10: 7

## 2021-04-07 ASSESSMENT — PAIN DESCRIPTION - PROGRESSION: CLINICAL_PROGRESSION: NOT CHANGED

## 2021-04-07 ASSESSMENT — PAIN DESCRIPTION - FREQUENCY
FREQUENCY: INTERMITTENT
FREQUENCY: CONTINUOUS
FREQUENCY: CONTINUOUS

## 2021-04-07 ASSESSMENT — PAIN DESCRIPTION - PAIN TYPE: TYPE: SURGICAL PAIN

## 2021-04-07 ASSESSMENT — PAIN DESCRIPTION - LOCATION: LOCATION: FOOT

## 2021-04-07 ASSESSMENT — PAIN DESCRIPTION - DESCRIPTORS: DESCRIPTORS: JABBING

## 2021-04-07 NOTE — PROGRESS NOTES
Clinical Pharmacy Progress Note  Pharmacy to dose Vancomycin    Admit date: 4/6/2021     Subjective:  Patient is a 75yr old female from home with a PMHx significant for HTN, HLP, DM type II, blood clots, and chronic R foot wound with OM s/p partial 5th ray amputation. Presents with worsening L foot and calf pain and redness. Per Podiatry evaluation, diabetic L foot infection with likely OM. Planning for I&D in the OR. Pharmacy has been consulted by Dr. Christel Gray to dose vancomycin. Current antibiotic regimen:   Zosyn -- day #1   4g IV x1 in ED (4/7)   3.375g IV q8h ext infusion (4/7-current)  Vancomycin -- pharmacy to dose -- day #2   2g IV x1 in ED (4/7)   1.25g IV q12h (4/7-current)      Objective:  Lab Results   Component Value Date    INR 1.08 04/07/2021       Recent Labs     04/07/21  0056   WBC 11.3*   HGB 8.6*   HCT 27.0*   MCV 77.6*          Recent Labs     04/07/21  0056 04/07/21  0312   *  --    K 5.6* 4.6   CL 98*  --    CO2 27  --    BUN 19  --    CREATININE 1.0  --        Estimated Creatinine Clearance: 61 mL/min (based on SCr of 1 mg/dL). Height = 64 in  Weight = 116.1 kg    Micro:      Prophylaxis:    VTE: enoxaparin (to start 4/8)   SUP:  Not indicated    Assessment/Plan:    1. LLE cellulitis, diabetic L foot ulcer with likely OM:  Planning for I&D in OR today. Vancomycin -- pharmacy to dose:  · Currently on 1.25g IV q12h, after receiving a 2g IV load early this morning. · Agree with current dose for now, based on renal function and adjusted body weight. · Plan to obtain a trough level once a steady state. Goal range for OM is 15-20 mcg/mL. · Will continue to follow renal function closely, as well as clinical progress, adjusting dose as needed. Thank you, please call with questions.    Nikolas LugoD., BCPS   4/7/2021 12:46 PM  Wireless: [phone]

## 2021-04-07 NOTE — BRIEF OP NOTE
Brief Postoperative Note    Patient: Andrew Stuart  YOB: 1945  MRN: [id number]    Date of Procedure: 4/7/2021    Pre-Op Diagnosis: DIABETIC FOOT INFECTION, LEFT FOOT    Post-Op Diagnosis: Same       Procedure(s):  LEFT LOWER EXTREMITY FOOT DEBRIDEMENT INCISION AND DRAINAGE WITH  5TH METATARSAL RESECTION    Surgeon(s):  Chemo Larson DPM    Assistant:  Resident: Jaja Lam DPM; Mery Noonan DPM    Anesthesia: General with local anesthetic    Hemostasis: Anatomic dissection, Electrocautery, Gel foam    Estimated Blood Loss (mL): Minimal    Materials: 3-0 Vicryl    Injectables: Pre: 20 cc 2% lidocaine plain; Post: 30 cc 0.5% marcaine plain    Complications: None    Specimens:   ID Type Source Tests Collected by Time Destination   1 : LEFT FOOT ABSCESS CULTURE SWAB Specimen Foot CULTURE, FUNGUS, CULTURE, SURGICAL, CULTURE WITH SMEAR, ACID FAST BACILLIUS Elizabeth Woodruff DPM 4/7/2021 1504    2 : LEFT FOOT 5TH METATARSAL FOR CULTURE Bone Bone CULTURE, FUNGUS, CULTURE, SURGICAL, CULTURE WITH SMEAR, ACID FAST BACILLIUS Elizabeth Ceja DPM 4/7/2021 1518    A : LEFT FOOT 5TH METATARSAL Specimen Foot SURGICAL PATHOLOGY Chemo Larson DPM 4/7/2021 1511    B : LEFT FOOT ULCERATED & AMPUTATED TISSUE  Tissue Tissue SURGICAL PATHOLOGY Chemo Larson DPM 4/7/2021 1524        Implants:  * No implants in log *      Drains: * No LDAs found *    Findings: Intra-operatively purulent drainage noted to dorsolateral wound communicating to 5th ray; Necrotic 5th metatarsal bone noted; Ischemic/Necrotic Peroneus longus and Peroneus brevis tendon noted at cuboid tunnel and base of 5th metatarsal    DISPO: S/P Left foot I&D with 5th metatarsal resection packed open; Staged procedure, with further debridement and surgical intervention required while in house; Strict bed rest till tomorrow AM; SW consulted for discharge planning and likely SNF placement    Electronically signed by Mery Noonan DPM on 4/7/2021 at 3:45 PM

## 2021-04-07 NOTE — ED PROVIDER NOTES
ED Attending Attestation Note     Date of evaluation: 4/6/2021    This patient was seen by the advance practice provider. I have seen and examined the patient, agree with the workup, evaluation, management and diagnosis. The care plan has been discussed. My assessment reveals complaints of swelling and drainage from the legs. Patient has obvious ulceration to the lateral aspect of the left foot with no obvious crepitus noted. Will check laboratory studies, imaging, discuss with podiatry.       Marylee Rash, MD  04/07/21 0613

## 2021-04-07 NOTE — CONSULTS
Pharmacy Note  Vancomycin Consult    Yanelis Armstrong is a 76 y.o. female started on Vancomycin for Diabetic foot infection /cellulitis; consult received from Dr. Javier Ha to manage therapy. Also receiving the following antibiotics: Zosyn 3.375 gm every 8 hours ext. infusion. 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 (HCC)    Class 3 severe obesity due to excess calories without serious comorbidity with body mass index (BMI) of 45.0 to 49.9 in [facility])    Hypoxia    Acute pulmonary embolism (HCC)    Dyspnea on exertion    Calcification of gallbladder    Osteomyelitis (HCC)     Allergies:  Latex, Adhesive tape, Aleve [naproxen sodium], Cozaar [losartan potassium], Iodine, Lasix [furosemide], Lisinopril, Metformin and related, Nifedipine, Norvasc [amlodipine besylate], Novolog [insulin aspart], Pioglitazone, and Zoloft [sertraline hcl]       Recent Labs     04/07/21  0056   BUN 19   CREATININE 1.0   WBC 11.3*     No intake or output data in the 24 hours ending 04/07/21 0422  Culture Date      Source                       Results  · Wound and Blood cultures are pending    Ht Readings from Last 1 Encounters:   04/07/21 5' 4\" (1.626 m)        Wt Readings from Last 1 Encounters:   04/07/21 256 lb (116.1 kg)       Body mass index is 43.94 kg/m². Estimated Creatinine Clearance: 61 mL/min (based on SCr of 1 mg/dL). Goal Trough Level: 15 -20 mcg/mL    Assessment/Plan:  · Patient received vancomycin 2000 mg IVx1, prior to this consult. · Will continue therapy with Vancomycin 1250 mg IV every 18 hours (~ 16 mg/kg per AdjBW of 79.3kg). · Timing of trough level will be determined based on culture results, renal function, and clinical response. Thank you for the consult.

## 2021-04-07 NOTE — PROGRESS NOTES
PACU Transfer Note    Vitals:    04/07/21 1645   BP: (!) 140/52 patient meets florecita discharge criteria   Pulse: 85   Resp: 21   Temp: 97.5 °F (36.4 °C)   SpO2: 99%       In: 50 [I.V.:50]  Out: -     Pain assessment:  none  Pain Level: 0    Report given to Receiving unit RN.    4/7/2021 4:56 PM

## 2021-04-07 NOTE — PROGRESS NOTES
Pt rates foot pain 10/10, but states she was taking tylenol at home and it works. Pt given tylenol as no other pain medication ordered. Will perfect serve MD if pt states pain is not resolved.

## 2021-04-07 NOTE — PROGRESS NOTES
Patient admitted to PACU # 17 from OR at 1551 post LEFT LOWER EXTREMITY FOOT DEBRIDEMENT INCISION AND DRAINAGE WITH 5TH METATARSAL RESECTION  per Dr. Damien Michelle  Attached to PACU monitoring system and report received from anesthesia provider. Patient was reported to be hemodynamically stable during procedure. Patient awake on admission and denied pain.

## 2021-04-07 NOTE — FLOWSHEET NOTE
04/07/21 1129   Encounter Summary   Services provided to: Patient   Referral/Consult From: Isaiah   Continue Visiting   (4/7/21, JORJE. )   Complexity of Encounter Moderate   Length of Encounter 15 minutes   Routine   Type Initial   Assessment Calm; Approachable   Intervention Nurtured hope   Outcome Expressed gratitude

## 2021-04-07 NOTE — PROGRESS NOTES
4 Eyes Skin Assessment     NAME:  Maco Beaulieu OF BIRTH:  1945  MEDICAL RECORD NUMBER:  [id number]    The patient is being assess for  Admission    I agree that 2 RN's have performed a thorough Head to Toe Skin Assessment on the patient. ALL assessment sites listed below have been assessed. Areas assessed by both nurses: Luba Favre and [facility], Face, Ears, Shoulders, Back, Chest, Arms, Elbows, Hands, Sacrum. Buttock, Coccyx, Ischium and Legs. Feet and Heels        Does the Patient have a Wound? Yes wound(s) were present on assessment.  LDA wound assessment was Initiated and completed        Samir Prevention initiated:  No   Wound Care Orders initiated:  No, Podiatry already on board    Pressure Injury (Stage 3,4, Unstageable, DTI, NWPT, and Complex wounds) if present place consult order under [de-identified] No    New and Established Ostomies if present place consult order under : No      Nurse 1 eSignature: Electronically signed by Merissa Napoles RN on 4/7/21 at 7:36 PM EDT    **SHARE this note so that the co-signing nurse is able to place an eSignature**    Nurse 2 eSignature: Lance Michele

## 2021-04-07 NOTE — ANESTHESIA POSTPROCEDURE EVALUATION
Department of Anesthesiology  Postprocedure Note    Patient: Justin Jorge  MRN: [id number]  YOB: 1945  Date of evaluation: 4/7/2021  Time:  5:01 PM     Procedure Summary     Date: 04/07/21 Room / Location: [address] [place] [address] [address] [place] [address] / [facility]    Anesthesia Start: 9650 Anesthesia Stop: 7319    Procedure: LEFT LOWER EXTREMITY FOOT DEBRIDEMENT INCISION AND DRAINAGE WITH  5TH METATARSAL RESECTION (Left ) Diagnosis: (DIABETIC FOOT INFECTION)    Surgeons: Kim Colvin DPM Responsible Provider: Miller Rucker DO    Anesthesia Type: general ASA Status: 3          Anesthesia Type: general    Meghan Phase I: Meghan Score: 9    Meghan Phase II:      Last vitals: Reviewed and per EMR flowsheets.        Anesthesia Post Evaluation    Patient location during evaluation: PACU  Patient participation: complete - patient participated  Level of consciousness: awake  Pain score: 2  Airway patency: patent  Nausea & Vomiting: no nausea and no vomiting  Complications: no  Cardiovascular status: blood pressure returned to baseline  Respiratory status: acceptable  Hydration status: euvolemic

## 2021-04-07 NOTE — CONSULTS
Department of Podiatry Consult Note  Resident       Reason for Consult: Left foot wound   Requesting Physician:  Bart Osgood    CHIEF COMPLAINT:  Left foot pain    HISTORY OF PRESENT ILLNESS:                The patient is a 76 y.o. female with significant past medical history as linked below. Podiatry was consulted for left foot wound. Patient unsure of how long she has had left foot wound. She reports previous surgical history to her right foot last year with bone infection and similar presentation. Patient reports her left foot began hurting 3 days ago and that this has significantly increased. Describes pain as sharp and rates it as 7/10 currently. Patient reports her foot and leg turned red yesterday and that she has been having a light red drainage amount of her wound. Patient reports that she did have surgery on her right foot around Thanksgiving 2020 with partial amputation, and had no issues with anesthesia or her postop course. Patient reports she last ate 4/6/2021 during the day and had some fruit juice last night. Patient denies fever, chills, nausea, vomiting, shortness of breath, chest pain. Patient has no other pedal complaints at this time.     Past Medical History:        Diagnosis Date    Acute respiratory failure with hypoxia ([address] 11/30/2019    Hx of blood clots 11/2019    PE    Hyperlipidemia     Hypertension     Mallet finger 3/4/2015    PONV (postoperative nausea and vomiting)     Type II or unspecified type diabetes mellitus without mention of complication, not stated as uncontrolled      Past Surgical History:        Procedure Laterality Date    BREAST BIOPSY Right     TOE AMPUTATION Right 10/30/2020    PARTIAL AMPUTATION 5TH RAY RIGHT FOOT performed by Bailee Bearden DPM at [address]     Current Medications:    Current Facility-Administered Medications: vancomycin (VANCOCIN) 2,000 mg in dextrose 5 % 500 mL IVPB, 2,000 mg, Intravenous, Once  Allergies:   Latex, Adhesive tape, Aleve [naproxen sodium], Cozaar [losartan potassium], Iodine, Lasix [furosemide], Lisinopril, Metformin and related, Nifedipine, Norvasc [amlodipine besylate], Novolog [insulin aspart], Pioglitazone, and Zoloft [sertraline hcl]  Social History:    TOBACCO:   reports that she quit smoking about 41 years ago. Her smoking use included cigarettes. She started smoking about 62 years ago. She has a 9.50 pack-year smoking history. She has never used smokeless tobacco.  ETOH:   reports current alcohol use of about 1.0 standard drinks of alcohol per week. DRUGS:   reports no history of drug use. Family History:       Problem Relation Age of Onset    Diabetes Father     Heart Disease Father     COPD Sister     Alzheimer's Disease Mother     Cancer Brother         lungs    Diabetes Sister     Stroke Sister     Cancer Sister         anal     REVIEW OF SYSTEMS:    As above  PHYSICAL EXAM:      Vitals:    BP (!) 177/82   Pulse 89   Temp 98.9 °F (37.2 °C) (Oral)   Resp 18   Ht 5' 4\" (1.626 m)   Wt 256 lb (116.1 kg)   LMP  (LMP Unknown)   SpO2 96%   BMI 43.94 kg/m²     LABS:   Recent Labs     04/07/21  0056   WBC 11.3*   HGB 8.6*   HCT 27.0*        Recent Labs     04/07/21  0056   *   K 5.6*   CL 98*   CO2 27   BUN 19   CREATININE 1.0     No results for input(s): PROT, INR, APTT in the last 72 hours. VASCULAR: DP and PT pulses nonpalpable bilateral.   Upon Doppler examination, DP signals monophasic and PT signals biphasic bilateral. CFT is sluggish to the digits of the foot b/l. Skin temperature is warm to warm left lower extremity from proximal to distal with  focal calor noted globally to the left foot. Skin temperature warm to cool right lower extremity. +2 pitting edema noted left lower extremity, +1 pitting edema noted right lower extremity. No pain with calf compression b/l. NEUROLOGIC: Gross and epicritic sensation is diminished b/l.  Protective sensation is diminished at all proximal phalanx, concerning for    osteomyelitis.  Cannot exclude superimposed fracture. 2.  Soft tissue swelling.  Lateral foot soft tissue air.               IMPRESSION/RECOMMENDATIONS:      -Diabetic foot infection, left foot  -Cellulitis, left lower extremity  -Diabetic foot ulceration, left foot, Earl 3  -Peripheral arterial disease, bilateral lower extremity  -Peripheral vascular disease, bilateral lower extremity  -Diabetes mellitus type 2 with peripheral neuropathy    -Patient examined and evaluated at bedside in the ED  -Hypertensive, otherwise VSS. Leukocytosis noted (WBC 11.3)  -.8. ESR greater than 120.  -Wound culture obtained; will follow up results  -X-ray reviewed and impression noted above. Lateral foot soft tissue air clinically correlates with tract of communicating wounds. -Dressing applied left lower extremity consisting of plain packing, wet-to-dry, gauze, Kerlix, Ace bandage.  -Continue antibiotics. -Discussed with patient the need for surgical intervention today. Patient agreeable. -Patient n.p.o. effective immediately. Hold anticoagulants.  -Heel weightbearing to left lower extremity. Discussed surgical intervention with patient at bedside. All potential risks, benefits, and complications were discussed with the patient prior to the scheduling of surgery. No guarantees or promises were made to what the outcomes will be. The patient wished to proceed with surgery, and informed written consent was obtained, signed, and placed on the patient's chart. Dispo: Diabetic foot infection with likely osteomyelitis, left foot. X-rays and labs reviewed. Continue antibiotics. Patient will require emergent surgical intervention today pending medical clearance per medicine team.    - The patient will be staffed with Dr. Kamilla Conley, [facility]ARR[facility] Alfonso - [facility]  04/07/21  3:37 AM    Patient was seen and evaluated at bedside. Agree with residents assessment and treatment plan. Prolonged discussion with patient that she has a life and limb threatening infection with gas gangrene. Patient will be scheduled for an urgent incision and drainage procedure. Discussed with patient this will likely be a staged procedure as once all necrotic and nonviable tissue and bone have been removed it will need to be allowed to decompress and drain and let the antibiotics be effective. All risks versus benefits of the planned procedure were discussed with the patient in detail. All questions were answered, no guarantees were given.   Bravo Beth DPM

## 2021-04-07 NOTE — PROGRESS NOTES
Pre-Operative Note  Resident Note     Patient: Yanelis Nathaniel     Procedure: Left lower extremity incision and drainage with excisional debridement of all nonviable soft tissue and bone with partial fifth ray resection    Clearance for surgery: per Internal Medicine    Consent: Procedure was discussed at length with patient and all questions were answered to completion, consent obtained and placed in chart     Labs:        Recent Labs     04/07/21 0056   WBC 11.3*   HGB 8.6*   HCT 27.0*   MCV 77.6*           Recent Labs     04/07/21  0056 04/07/21  0312   *  --    K 5.6* 4.6   CL 98*  --    CO2 27  --    BUN 19  --    CREATININE 1.0  --       No results for input(s): AST, ALT, ALB, BILIDIR, BILITOT, ALKPHOS in the last 72 hours. No results for input(s): LIPASE, AMYLASE in the last 72 hours. Recent Labs     04/07/21 0312   INR 1.08      No results for input(s): CKTOTAL, CKMB, CKMBINDEX, TROPONINI in the last 72 hours.     Pre-op Medications:   Current Facility-Administered Medications   Medication Dose Route Frequency Provider Last Rate Last Admin    sodium chloride flush 0.9 % injection 5-40 mL  5-40 mL Intravenous 2 times per day Bogdan Jara MD        sodium chloride flush 0.9 % injection 5-40 mL  5-40 mL Intravenous PRN Bogdan Jara MD        0.9 % sodium chloride infusion  25 mL Intravenous PRN Bogdan Jara MD        enoxaparin (LOVENOX) injection 40 mg  40 mg Subcutaneous Daily Bogdan Jara MD        promethazine (PHENERGAN) tablet 12.5 mg  12.5 mg Oral Q6H PRN Bogdan Jara MD        Or    ondansetron [facility]) injection 4 mg  4 mg Intravenous Q6H PRN Bogdan Jara MD        polyethylene glycol (GLYCOLAX) packet 17 g  17 g Oral Daily PRN Bogdan Jara MD        acetaminophen (TYLENOL) tablet 650 mg  650 mg Oral Q6H PRN Bogdan Jara MD        Or    acetaminophen (TYLENOL) suppository 650 mg  650 mg Rectal Q6H PRN Bogdan INTO THE ARM UNDER THE SKIN AT LUNCH AND THEN INJECT 25 UNITS UNDER THE SKIN AT BEDTIME (Patient taking differently: Sliding scale) 1 vial 2    Handicap Placard MISC by Does not apply route Duration 5 years 1 each 0    Insulin Syringe-Needle U-100 (BD INSULIN SYRINGE U/F) 31G X 5/16\" 0.5 ML MISC USE ONE SYRINGE FOUR TIMES A  each 5    ONETOUCH DELICA LANCETS 32A MISC USE TO TEST BLOOD SUGAR THREE TIMES A  each 0    Blood Pressure KIT Apply 1 applicator topically 2 times daily 1 kit 0    blood glucose monitor strips Test 3-4 times a day & as needed for symptoms of irregular blood glucose. DX: E11.9. Supply the strips that go with her meter and approved by insurance 300 strip 1    Lancets MISC 1 each by Does not apply route daily Test 3-4 times a day & as needed for symptoms of irregular blood glucose. DX: E11.9. Supply the lancets that go with her meter and approved by insurance 300 each 3    Continuous Blood Gluc Sensor (FREESTYLE VÍCTOR 14 DAY SENSOR) MISC CHECK GLUCOSE 4 TIMES DAILY 2 each 2    B-D ULTRAFINE III SHORT PEN 31G X 8 MM MISC USE WITH INSULIN TWO TIMES A DAY AS DIRECTED 100 each 2    glucose monitoring kit (FREESTYLE) monitoring kit Use a ONE TOUCH 1 kit 0    EPINEPHrine (EPIPEN 2-OSMAR) 0.3 MG/0.3ML SOAJ injection Inject 0.3 mLs into the muscle once for 1 dose. Use as directed for allergic reaction (Patient not taking: Reported on 1/22/2020) 0.3 mL 2       Diet: DIET CARB CONTROL;    CXR: No acute findings    EKG: Reviewed by ED physician    Echocardiogram: not done    IV access/ saline lock: Yes    PT/INR: 12.5/1.08    Type and Screen: O pos; Antibody negative    Pregnancy test: N/A    COVID-19: Not Tested    Known risk factors for perioperative complications: Diabetes mellitus    Anesthesia to see patient.     Cristobal Vásquez DPM  Podiatric Resident, PGY-1  4/7/2021, 4:37 AM

## 2021-04-07 NOTE — ANESTHESIA PRE PROCEDURE
Department of Anesthesiology  Preprocedure Note       Name:  Addy Sr   Age:  76 y.o.  :  1945                                          MRN:  [id number]         Date:  2021      Surgeon: Brenda Pierce):  Gregoria Abarca DPM    Procedure: Procedure(s):  LEFT LOWER EXTREMITY FOOT DEBRIDEMENT INCISION AND DRAINAGE WITH PARTIAL 5TH RAY RESECTION    Medications prior to admission:   Prior to Admission medications    Medication Sig Start Date End Date Taking? Authorizing Provider   insulin NPH (HUMULIN N) 100 UNIT/ML injection vial INJECT 45 UNITS UNDER THE SKIN EVERY MORNING AND INJECT 25 UNITS UNDER THE SKIN EVERY NIGHT AT BEDTIME 21   Fredy Door, DO   ONETOUCH ULTRA strip USE ONE STRIP TO TEST TWICE A DAY AS NEEDED 3/8/21   Fredy Door, DO   simvastatin (ZOCOR) 20 MG tablet TAKE ONE TABLET BY MOUTH ONCE NIGHTLY 3/5/21   Fredy Door, DO   labetalol (NORMODYNE) 100 MG tablet TAKE ONE TABLET BY MOUTH TWICE A DAY 3/5/21   Fredy Door, DO   [facility] WOUND DRESSING MATRIX Apply topically Trim product to match the size of the wound. Apply topically. Moisten lightly with saline. Cover with dry sterile dressing. 20   Ruthellen Going, DPM   SODIUM CHLORIDE, EXTERNAL, (SALINE WOUND [address]) 0.9 % SOLN Moisten Promogran wound dressing with saline with each application.  20   Ruthellen Going, DPM   clindamycin (CLEOCIN) 300 MG capsule TAKE ONE CAPSULE BY MOUTH THREE TIMES A DAY 10/31/20   Mercedez Going, DPM   vitamin B-12 (CYANOCOBALAMIN) 100 MCG tablet Take 50 mcg by mouth daily    Historical Provider, MD   Multiple Vitamin (MULTIVITAMIN PO) Take by mouth daily    Historical Provider, MD   acetaminophen (TYLENOL) 500 MG tablet Take 500 mg by mouth every 6 hours as needed for Pain    Historical Provider, MD   insulin regular (HUMULIN R) 100 UNIT/ML injection INJECT 45 UNITS INTO THE ARM UNDER THE SKIN AT LUNCH AND THEN INJECT 25 UNITS UNDER THE SKIN AT BEDTIME  Patient taking differently: Sliding scale 20   Elida Alberta Mccarthy MD   Handicap Placard MISC by Does not apply route Duration 5 years 1/22/20   Sydnie Gavin MD   Insulin Syringe-Needle U-100 (BD INSULIN SYRINGE U/F) 31G X 5/16\" 0.5 ML MISC USE ONE SYRINGE FOUR TIMES A DAY 10/22/19   Sydnie Gavin MD   ONETOUCH DELICA LANCETS 80C MISC USE TO TEST BLOOD SUGAR THREE TIMES A DAY 10/22/19   Sydnie Gavin MD   Blood Pressure KIT Apply 1 applicator topically 2 times daily 8/22/19   Sean Martin MD   blood glucose monitor strips Test 3-4 times a day & as needed for symptoms of irregular blood glucose. DX: E11.9. Supply the strips that go with her meter and approved by insurance 6/21/19   Sean Martin MD   Lancets MISC 1 each by Does not apply route daily Test 3-4 times a day & as needed for symptoms of irregular blood glucose. DX: E11.9. Supply the lancets that go with her meter and approved by insurance 6/21/19   Sean Martin MD   Continuous Blood Gluc Sensor (FREESTYLE VÍCTOR 14 DAY SENSOR) MISC CHECK GLUCOSE 4 TIMES DAILY 12/24/18   Collins Trinh MD   B-D ULTRAFINE III SHORT PEN 31G X 8 MM MISC USE WITH INSULIN TWO TIMES A DAY AS DIRECTED 8/13/18   Collins Trinh MD   glucose monitoring kit (FREESTYLE) monitoring kit Use a ONE TOUCH 3/27/18   Collins Trinh MD   EPINEPHrine (EPIPEN 2-OSMAR) 0.3 MG/0.3ML SOAJ injection Inject 0.3 mLs into the muscle once for 1 dose. Use as directed for allergic reaction  Patient not taking: Reported on 1/22/2020 7/21/14 12/9/19  Collins Trinh MD   sitagliptan (JANUVIA) 100 MG tablet Take 1 tablet by mouth daily for 360 days.  2/18/11 5/18/11  Collins Trinh MD       Current medications:    Current Facility-Administered Medications   Medication Dose Route Frequency Provider Last Rate Last Admin    sodium chloride flush 0.9 % injection 5-40 mL  5-40 mL Intravenous 2 times per day Bogdan Denny MD        sodium chloride flush 0.9 % injection 5-40 mL  5-40 mL Intravenous PRN Bogdan Klein Bev Meza MD        0.9 % sodium chloride infusion  25 mL Intravenous PRN Bogdan Jesus MD        promethazine (PHENERGAN) tablet 12.5 mg  12.5 mg Oral Q6H PRN Bogdan Jesus MD        Or    ondansetron [facility]) injection 4 mg  4 mg Intravenous Q6H PRN Bogdan Jesus MD        polyethylene glycol (GLYCOLAX) packet 17 g  17 g Oral Daily PRN Bogdan Jesus MD        acetaminophen (TYLENOL) tablet 650 mg  650 mg Oral Q6H PRN Bogdan Jesus MD   650 mg at 04/07/21 1009    Or    acetaminophen (TYLENOL) suppository 650 mg  650 mg Rectal Q6H PRN Bogdan Jesus MD        piperacillin-tazobactam (ZOSYN) 3,375 mg in dextrose 5 % 100 mL IVPB extended infusion (mini-bag)  3,375 mg Intravenous Q8H Bogdan Jesus MD 25 mL/hr at 04/07/21 0857 3,375 mg at 04/07/21 0857    lactated ringers infusion   Intravenous Continuous Bogdan Jesus MD        insulin lispro (1 Unit Dial) 0-12 Units  0-12 Units Subcutaneous TID WC Bogdan Jesus MD        insulin lispro (1 Unit Dial) 0-6 Units  0-6 Units Subcutaneous Nightly Bogdan Jessu MD        insulin glargine (LANTUS;BASAGLAR) injection pen 30 Units  30 Units Subcutaneous Nightly Bogdan Jesus MD        vancomycin (VANCOCIN) 1,250 mg in dextrose 5 % 250 mL IVPB  15 mg/kg (Adjusted) Intravenous Q12H Bogdan Jesus MD        [START ON 4/8/2021] enoxaparin (LOVENOX) injection 40 mg  40 mg Subcutaneous Daily Aliene Aria, DPM        oxyCODONE (ROXICODONE) immediate release tablet 5 mg  5 mg Oral Q4H PRN Aliene Aria, DPM        Or    oxyCODONE (ROXICODONE) immediate release tablet 10 mg  10 mg Oral Q4H PRN Aliene Aria, DPM           Allergies:     Allergies   Allergen Reactions    Latex Rash    Adhesive Tape     Aleve [Naproxen Sodium]      Possible angioedema    Cozaar [Losartan Potassium]      angioedema    Iodine     Lasix [Furosemide] Swelling    Lisinopril      Cough and sore throat     Metformin And Related      Possible angioedema    Nifedipine      swells    Norvasc [Amlodipine Besylate]      Leg swelling    Novolog [Insulin Aspart]      dizzyness    Pioglitazone      edema    Zoloft [Sertraline Hcl]      Vision loss       Problem List:    Patient Active Problem List   Diagnosis Code    Hyperlipidemia E78.5    HTN (hypertension) I10    Uncontrolled type 2 diabetes mellitus with complication, with long-term current use of insulin ([facility]) E11.8, E11.65, Z79.4    Angioedema T78. 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 ([facility]) I83.212, L97.219    Class 3 severe obesity due to excess calories without serious comorbidity with body mass index (BMI) of 45.0 to 49.9 in adult ([facility]) E66.01, Z68.42    Hypoxia R09.02    Acute pulmonary embolism ([facility]) I26.99    Dyspnea on exertion R06.00    Calcification of gallbladder K82.8    Osteomyelitis ([facility]) M86.9       Past Medical History:        Diagnosis Date    Acute respiratory failure with hypoxia ([facility] [address] 2019    Hx of blood clots 2019    PE    Hyperlipidemia     Hypertension     Mallet finger 3/4/2015    PONV (postoperative nausea and vomiting)     Type II or unspecified type diabetes mellitus without mention of complication, not stated as uncontrolled        Past Surgical History:        Procedure Laterality Date    BREAST BIOPSY Right     TOE AMPUTATION Right 10/30/2020    PARTIAL AMPUTATION 5TH RAY RIGHT FOOT performed by Silvano Bran DPM at [address] [place] [address]       Social History:    Social History     Tobacco Use    Smoking status: Former Smoker     Packs/day: 0.25     Years: 38.00     Pack years: 9.50     Types: Cigarettes     Start date: 10/22/1958     Quit date: 1980     Years since quittin.2    Smokeless tobacco: Never Used   Substance Use Topics    Alcohol use:  Yes     Alcohol/week: 1.0 standard drinks     Types: 1 Cans of beer per week     Comment: occasional Counseling given: Not Answered      Vital Signs (Current):   Vitals:    04/07/21 0315 04/07/21 0652 04/07/21 0856 04/07/21 0958   BP: (!) 177/82 (!) 172/77 120/74 (!) 121/58   Pulse: 89 92 89 94   Resp: 18 16 18 18   Temp: 98.9 °F (37.2 °C)   99.3 °F (37.4 °C)   TempSrc: Oral   Oral   SpO2: 96% 95% 95% 94%   Weight:       Height:                                                  BP Readings from Last 3 Encounters:   04/07/21 (!) 121/58   10/30/20 (!) 143/67   10/30/20 (!) 143/52       NPO Status:                                                                                 BMI:   Wt Readings from Last 3 Encounters:   04/07/21 256 lb (116.1 kg)   12/29/20 257 lb (116.6 kg)   12/22/20 157 lb (71.2 kg)     Body mass index is 43.94 kg/m².     CBC:   Lab Results   Component Value Date    WBC 11.3 04/07/2021    RBC 3.48 04/07/2021    HGB 8.6 04/07/2021    HCT 27.0 04/07/2021    MCV 77.6 04/07/2021    RDW 18.3 04/07/2021     04/07/2021       CMP:   Lab Results   Component Value Date     04/07/2021    K 4.6 04/07/2021    K 5.6 04/07/2021    CL 98 04/07/2021    CO2 27 04/07/2021    BUN 19 04/07/2021    CREATININE 1.0 04/07/2021    GFRAA >60 04/07/2021    GFRAA >60 04/11/2013    AGRATIO 0.7 10/28/2020    LABGLOM 54 04/07/2021    GLUCOSE 136 04/07/2021    PROT 6.7 10/28/2020    CALCIUM 8.7 04/07/2021    BILITOT <0.2 10/28/2020    ALKPHOS 63 10/28/2020    AST 11 10/28/2020    ALT 9 10/28/2020       POC Tests:   Recent Labs     04/07/21  1020   POCGLU 191*       Coags:   Lab Results   Component Value Date    PROTIME 12.5 04/07/2021    INR 1.08 04/07/2021    APTT 29.0 10/28/2020       HCG (If Applicable): No results found for: PREGTESTUR, PREGSERUM, HCG, HCGQUANT     ABGs: No results found for: PHART, PO2ART, PFG3LAC, GNQ7GKP, BEART, M1ZUIQRA     Type & Screen (If Applicable):  No results found for: LABABO, LABRH    Drug/Infectious Status (If Applicable):  No results found for: HIV, HEPCAB    COVID-19 Screening (If Applicable):   Lab Results   Component Value Date    COVID19 Not Detected 10/26/2020           Anesthesia Evaluation  Patient summary reviewed   history of anesthetic complications (PONV  once): PONV. Airway: Mallampati: III  TM distance: >3 FB   Neck ROM: full  Mouth opening: > = 3 FB Dental: normal exam         Pulmonary:   (+) asthma:                            Cardiovascular:    (+) hypertension:, past MI (pt says she had an MI in her 19's):,                ROS comment:  Summary   Normal left ventricle size. There is left ventricular hypertrophy. Overall   left ventricular systolic function appears normal with an ejection fraction   estimated at 55-60%. There is septal flattening consistent with right   ventricular overload. Indeterminate diastolic function. Moderate tricuspid regurgitation. Estimated pulmonary artery systolic pressure is elevated at 65 mmHg assuming   a right atrial pressure of 8 mmHg. Biatrial enlargement. The right ventricle is enlarged. Signature      ------------------------------------------------------------------   Electronically signed by Ben Quinonez MD   (Interpreting physician) on 12/03/2019 at 02:35 PM   ------------------------------------------------------------------     Neuro/Psych:               GI/Hepatic/Renal:   (+) morbid obesity (BMI 44)          Endo/Other:    (+) Diabetes, : arthritis:., .                 Abdominal:           Vascular:                                        Anesthesia Plan      general     ASA 3       Induction: intravenous. Anesthetic plan and risks discussed with patient.                       Antonio Meza MD   4/7/2021

## 2021-04-07 NOTE — PROGRESS NOTES
Pre-Op Examination     Mariam Ibarra                                               : 1945  Age: 76 y.o. MRN: [id number]  Date : 2021    Known risk factors for perioperative complications: Diabetes Mellitus with insulin use  {Preop assessment:09680    Pre-Operative Risk assessment using 2014 ACC/AHA guidelines     Emergent procedure No  Active Cardiac Condition No (decompensated HF, Arrhythmia, MI <3 weeks, severe valve disease)  Risk Level of Procedure Low Risk (endoscopy, superficial skin, breast, ambulatory, or cataract, etc.)  Revised Cardiac Risk Index Risk factors: Diabetic treated with insulin  Measurement of Exercise Tolerance before Surgery >4 Yes    According to the 2014 ACC/AHA pre-operative risk assessment guidelines Mariam Ibarra is a low risk for major cardiac complications during a low risk procedure and may continue as planned. Specific medication recommendations are listed below. Medications recommended to continue should be taken with a sip of water even when NPO. Further recommendations from consultants: None    In my opinion, Mr. Beverley Perez is of low risk for planned surgery of her foot.      Amador Moreira M.D.   2021, 10:59 AM

## 2021-04-07 NOTE — ED NOTES
Pt came into the ED with swelling in her legs that weep fluid. States that this has been going on for the past few days.      Michelle Otero RN  04/07/21 0006

## 2021-04-07 NOTE — H&P
Hospital Medicine History & Physical      PCP: Zuleyka Card MD    Date of Admission: 4/6/2021    Date of Service: Pt seen/examined on 4/7/2021 and Admitted to Inpatient with expected LOS greater than two midnights due to medical therapy. Chief Complaint:  Foot pain/discharge      History Of Present Illness:  Mrs. 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 She endorses increased leg swelling and tightness as well as redness in the LLE. She was having persistent 10/10 aching foot and leg pain worsened by ambulation over the past 3 days. She states she attempted to walk o her heels to keep pressure of the ball of the foot, but as soon as she put pressure there puss came out of the foot. At that time she sought medical attention. Additionally she endorses 'feeling hot' over the last few days, but no chills or night sweats. She denies N, V, D, or appetite changes. Denies CP and SOB, cough, and dysuria. She had been adherent with her insulin therapy and has been checking her blood glucose. She has not been seen by Podiatry since last Thanksgiving, and not seen her PCP since the start of the pandemic in March.      Past Medical History:          Diagnosis Date    Acute respiratory failure with hypoxia ([address] 11/30/2019    Hx of blood clots 11/2019    PE    Hyperlipidemia     Hypertension     Mallet finger 3/4/2015    PONV (postoperative nausea and vomiting)     Type II or unspecified type diabetes mellitus without mention of complication, not stated as uncontrolled        Past Surgical History:          Procedure Laterality Date    BREAST BIOPSY Right     TOE AMPUTATION Right 10/30/2020    PARTIAL AMPUTATION 5TH RAY RIGHT FOOT performed by Gee Teixeira DPM at [address]       Medications Prior to Admission: Prior to Admission medications    Medication Sig Start Date End Date Taking? Authorizing Provider   insulin NPH (HUMULIN N) 100 UNIT/ML injection vial INJECT 45 UNITS UNDER THE SKIN EVERY MORNING AND INJECT 25 UNITS UNDER THE SKIN EVERY NIGHT AT BEDTIME 4/5/21   Amber Velasquez DO   ONETOUCH ULTRA strip USE ONE STRIP TO TEST TWICE A DAY AS NEEDED 3/8/21   Amber Velasquez DO   simvastatin (ZOCOR) 20 MG tablet TAKE ONE TABLET BY MOUTH ONCE NIGHTLY 3/5/21   Amber Velasqeuz DO   labetalol (NORMODYNE) 100 MG tablet TAKE ONE TABLET BY MOUTH TWICE A DAY 3/5/21   Amber Velasquez DO   [facility] WOUND DRESSING MATRIX Apply topically Trim product to match the size of the wound. Apply topically. Moisten lightly with saline. Cover with dry sterile dressing. 11/17/20   Beatriz Brown DPM   SODIUM CHLORIDE, EXTERNAL, (SALINE WOUND [address]) 0.9 % SOLN Moisten Promogran wound dressing with saline with each application.  11/17/20   Beatriz Brown DPM   clindamycin (CLEOCIN) 300 MG capsule TAKE ONE CAPSULE BY MOUTH THREE TIMES A DAY 10/31/20   Beatriz Brown DPM   vitamin B-12 (CYANOCOBALAMIN) 100 MCG tablet Take 50 mcg by mouth daily    Historical Provider, MD   Multiple Vitamin (MULTIVITAMIN PO) Take by mouth daily    Historical Provider, MD   acetaminophen (TYLENOL) 500 MG tablet Take 500 mg by mouth every 6 hours as needed for Pain    Historical Provider, MD   insulin regular (HUMULIN R) 100 UNIT/ML injection INJECT 45 UNITS INTO THE ARM UNDER THE SKIN AT LUNCH AND THEN INJECT 25 UNITS UNDER THE SKIN AT BEDTIME  Patient taking differently: Sliding scale 5/26/20   Ramu Osullivan MD   Handicap Rickard MISC by Does not apply route Duration 5 years 1/22/20   Ramu Osullivan MD   Insulin Syringe-Needle U-100 (BD INSULIN SYRINGE U/F) 31G X 5/16\" 0.5 ML MISC USE ONE SYRINGE FOUR TIMES A DAY 10/22/19   Ramu Osullivan MD   ONETOUCH DELICA LANCETS 35L MISC USE TO TEST BLOOD SUGAR THREE TIMES A DAY 10/22/19   Ramu Osullivan MD   Blood Pressure last 72 hours. Recent Labs     04/07/21  0312   INR 1.08     No results for input(s): Rose Hicks in the last 72 hours. Urinalysis:      Lab Results   Component Value Date    NITRU Negative 11/30/2019    WBCUA 10-20 11/30/2019    BACTERIA 2+ 11/30/2019    RBCUA 3-5 11/30/2019    BLOODU TRACE-INTACT 11/30/2019    SPECGRAV 1.020 11/30/2019    GLUCOSEU 100 11/30/2019       Radiology:     CXR: I have reviewed the CXR with the following interpretation: ninacute  EKG:  I have reviewed the EKG with the following interpretation: old RBBB, no ischemia noted. XR CHEST PORTABLE   Final Result     No acute findings in the chest.          XR FOOT LEFT (MIN 3 VIEWS)   Final Result   1. Lucencies in 5th metatarsal and 5th proximal phalanx, concerning for    osteomyelitis. Cannot exclude superimposed fracture. 2.  Soft tissue swelling. Lateral foot soft tissue air. ASSESSMENT:    Active Hospital Problems    Diagnosis Date Noted    Osteomyelitis [facility]) [M86.9] 04/07/2021         PLAN:    Infected Diabetic Foot Ulcer with suspected Osteomyelitis  - Podiatry to take to OR today for debridement and possible ray amputation of left 5th ray  - Vancomycin and Zosyn  - dressing and wound care per Podiatry  - trending CRP  - Roxicodone 5,10mg pain panel    Insulin Dependant Diabetes Mellitus  - Lantus 30U HS  - Medium Dose SSI AC HS  - POC Glucose AC HS   - hypoglycemia protocol    Other Chronic Issues  HTN- labetalol 100mg BID  HLD - lipitor 20mg daily    DVT Prophylaxis: Lovenox  Diet: Diet NPO Effective Now Exceptions are: Sips of Water with Meds  Dietary Nutrition Supplements: Wound Healing Oral Supplement, Diabetic Oral Supplement  Code Status: Full Code    PT/OT Eval Status: pending surgery  Dispo - OR    This patient will be staffed and discussed with Dr. Mann School, DO    Thank you Rafaela Rico MD for the opportunity to be involved in this patient's care.  If you have any questions or concerns please feel free to contact me at [phone].

## 2021-04-07 NOTE — ED PROVIDER NOTES
[address] [facility] [address] ENCOUNTER          PHYSICIAN ASSISTANT NOTE       Date of evaluation: 4/6/2021    Chief Complaint     Leg Pain      History of Present Illness     Juliane Zuniga is a 76 y.o. female who presents with left foot pain for the last couple of days. She states that she noticed some pain and drainage on her left foot and so she has been walking on her heel for about 2 to 3 days. She states today she stepped down and had more drainage and has noticed that her left leg has become more swollen so she came to the emergency department. Patient states she has seen podiatry in the past and has had CT of he denies amputation on the left toe, however, states that her podiatrist left the practice and so she has not seen anyone since. She admits to feeling hot but does not know if she had a fever. Denies chills. Denies known injury or trauma. She describes a constant, achy pain in the leg and foot. Pain is worse when she ambulates. Denies alleviating factors. Rates it as moderate. Review of Systems     Review of Systems   Constitutional: Negative for chills and fever. HENT: Negative for sore throat. Eyes: Negative for redness. Respiratory: Negative for shortness of breath. Cardiovascular: Negative for chest pain. Gastrointestinal: Negative for abdominal pain, diarrhea, nausea and vomiting. Genitourinary: Negative for difficulty urinating. Musculoskeletal: Positive for myalgias. Negative for back pain. Skin: Positive for color change and wound. Neurological: Negative for weakness, light-headedness, numbness and headaches. Psychiatric/Behavioral: Negative for confusion. All other systems reviewed and are negative.       Past Medical, Surgical, Family, and Social History     She has a past medical history of Acute respiratory failure with hypoxia ([facility] [address] Hx of blood clots, Hyperlipidemia, Hypertension, Mallet finger, PONV (postoperative nausea and vomiting), and Type II or unspecified type diabetes mellitus without mention of complication, not stated as uncontrolled. She has a past surgical history that includes Breast biopsy (Right) and Toe amputation (Right, 10/30/2020). Her family history includes Alzheimer's Disease in her mother; COPD in her sister; Cancer in her brother and sister; Diabetes in her father and sister; Heart Disease in her father; Stroke in her sister. She reports that she quit smoking about 41 years ago. Her smoking use included cigarettes. She started smoking about 62 years ago. She has a 9.50 pack-year smoking history. She has never used smokeless tobacco. She reports current alcohol use of about 1.0 standard drinks of alcohol per week. She reports that she does not use drugs. Medications     Previous Medications    ACETAMINOPHEN (TYLENOL) 500 MG TABLET    Take 500 mg by mouth every 6 hours as needed for Pain    B-D ULTRAFINE III SHORT PEN 31G X 8 MM MISC    USE WITH INSULIN TWO TIMES A DAY AS DIRECTED    BLOOD GLUCOSE MONITOR STRIPS    Test 3-4 times a day & as needed for symptoms of irregular blood glucose. DX: E11.9. Supply the strips that go with her meter and approved by insurance    BLOOD PRESSURE KIT    Apply 1 applicator topically 2 times daily    CLINDAMYCIN (CLEOCIN) 300 MG CAPSULE    TAKE ONE CAPSULE BY MOUTH THREE TIMES A DAY    CONTINUOUS BLOOD GLUC SENSOR (FREESTYLE VÍCTOR 14 DAY SENSOR) MIS    CHECK GLUCOSE 4 TIMES DAILY    EPINEPHRINE (EPIPEN 2-OSMAR) 0.3 MG/0.3ML SOAJ INJECTION    Inject 0.3 mLs into the muscle once for 1 dose.  Use as directed for allergic reaction    GLUCOSE MONITORING KIT (FREESTYLE) MONITORING KIT    Use a ONE TOUCH    HANDICAP PLACARD MISC    by Does not apply route Duration 5 years    INSULIN NPH (HUMULIN N) 100 UNIT/ML INJECTION VIAL    INJECT 45 UNITS UNDER THE SKIN EVERY MORNING AND INJECT 25 UNITS UNDER THE SKIN EVERY NIGHT AT BEDTIME    INSULIN REGULAR (HUMULIN R) 100 UNIT/ML INJECTION    INJECT 45 regular rhythm. Heart sounds: Normal heart sounds. Pulmonary:      Effort: Pulmonary effort is normal. No respiratory distress. Breath sounds: Normal breath sounds. Abdominal:      Palpations: Abdomen is soft. Tenderness: There is no abdominal tenderness. Musculoskeletal: Normal range of motion. Left foot: Tenderness and swelling present. Comments: Patient has a large ulcer approximately 6 cm in diameter on the lateral plantar surface of the left foot. Purulent drainage is expressible from the site. Patient has chronic thickened and flaking skin on bilateral lower extremities. Left lower leg with erythema extending to the just inferior to the knee   Skin:     General: Skin is warm and dry. Neurological:      Mental Status: She is alert and oriented to person, place, and time. Psychiatric:         Behavior: Behavior normal.         Thought Content: Thought content normal.         Judgment: Judgment normal.         Diagnostic Results     RADIOLOGY:  XR FOOT LEFT (MIN 3 VIEWS)   Final Result   1. Lucencies in 5th metatarsal and 5th proximal phalanx, concerning for    osteomyelitis. Cannot exclude superimposed fracture. 2.  Soft tissue swelling. Lateral foot soft tissue air.               LABS:   Results for orders placed or performed during the hospital encounter of 04/06/21   CBC Auto Differential   Result Value Ref Range    WBC 11.3 (H) 4.0 - 11.0 K/uL    RBC 3.48 (L) 4.00 - 5.20 M/uL    Hemoglobin 8.6 (L) 12.0 - 16.0 g/dL    Hematocrit 27.0 (L) 36.0 - 48.0 %    MCV 77.6 (L) 80.0 - 100.0 fL    MCH 24.6 (L) 26.0 - 34.0 pg    MCHC 31.7 31.0 - 36.0 g/dL    RDW 18.3 (H) 12.4 - 15.4 %    Platelets 531 298 - 577 K/uL    MPV 7.7 5.0 - 10.5 fL    Neutrophils % 84.0 %    Lymphocytes % 7.2 %    Monocytes % 7.3 %    Eosinophils % 1.1 %    Basophils % 0.4 %    Neutrophils Absolute 9.5 (H) 1.7 - 7.7 K/uL    Lymphocytes Absolute 0.8 (L) 1.0 - 5.1 K/uL    Monocytes Absolute 0.8 0.0 - 1.3 K/uL    Eosinophils Absolute 0.1 0.0 - 0.6 K/uL    Basophils Absolute 0.0 0.0 - 0.2 K/uL   Basic Metabolic Panel w/ Reflex to MG   Result Value Ref Range    Sodium 135 (L) 136 - 145 mmol/L    Potassium reflex Magnesium 5.6 (H) 3.5 - 5.1 mmol/L    Chloride 98 (L) 99 - 110 mmol/L    CO2 27 21 - 32 mmol/L    Anion Gap 10 3 - 16    Glucose 136 (H) 70 - 99 mg/dL    BUN 19 7 - 20 mg/dL    CREATININE 1.0 0.6 - 1.2 mg/dL    GFR Non-African American 54 (A) >60    GFR African American >60 >60    Calcium 8.7 8.3 - 10.6 mg/dL   Sedimentation Rate   Result Value Ref Range    Sed Rate >120 (H) 0 - 30 mm/Hr   CRP   Result Value Ref Range    .8 (H) 0.0 - 5.1 mg/L   Lactate, Sepsis   Result Value Ref Range    Lactic Acid, Sepsis 1.2 0.4 - 1.9 mmol/L   POCT Glucose   Result Value Ref Range    POC Glucose 139 (H) 70 - 99 mg/dl    Performed on ACCU-CHEK        ED BEDSIDE ULTRASOUND:      RECENT VITALS:  BP: (!) 189/79,  , Pulse: 91, Resp: 18, SpO2: 97 %     Procedures         ED Course     Nursing Notes, Past Medical Hx,Past Surgical Hx, Social Hx, Allergies, and Family Hx were reviewed. The patient was given the following medications:  Orders Placed This Encounter   Medications    piperacillin-tazobactam (ZOSYN) 4,500 mg in dextrose 5 % 100 mL IVPB (mini-bag)     Order Specific Question:   Antimicrobial Indications     Answer: Other     Order Specific Question:   Other Abx Indication     Answer: Suspected Sepsis of Skin or Soft Tissue Origin    vancomycin (VANCOCIN) 2,000 mg in dextrose 5 % 500 mL IVPB     Order Specific Question:   Antimicrobial Indications     Answer: Other     Order Specific Question:   Other Abx Indication     Answer:    Suspected Sepsis of Skin or Soft Tissue Origin    0.9 % sodium chloride bolus       CONSULTS:  IP CONSULT TO PODIATRY  IP CONSULT TO HOSPITALIST    MEDICAL DECISION MAKING / ASSESSMENT / Justyn Rio is a 76 y.o. female who presents emergency department with left leg pain and swelling. Has a large ulcer noted on the left foot with surrounding cellulitis. Wound culture was obtained from the ulcer site. Patient had work-up initiated for sepsis as the source of her left foot. Blood cultures were obtained. Patient was ordered vancomycin and Zosyn and she was given 1 L normal saline. Labs were remarkable for a leukocytosis of 11,000 and H&H 8.6 and 27.0. Lactate 1.2. BMP with hemolyzed potassium that was repeated and is in process. Glucose 136. X-ray shows lucencies in the fifth metatarsal and proximal phalanx concerning for osteomyelitis. Cannot exclude superimposed fracture. Soft tissue swelling and lateral soft tissue air. .8, ESR greater than 120. I spoke with podiatry who was in agreement with the plan of antibiotics. Patient will be admitted to hospitalist for continued management with podiatry to follow. Hospitalist has accepted patient for continued management. Patient is in agreement the plan. This patient was also evaluated by the attending physician. All care plans were discussed and agreed upon. Clinical Impression     1. Other acute osteomyelitis of left foot ([address]    2. Sepsis due to cellulitis [facility])        Disposition     PATIENT REFERRED TO:  No follow-up provider specified.     DISCHARGE MEDICATIONS:  New Prescriptions    No medications on file       DISPOSITION Decision To Admit 04/07/2021 01:38:37 AM        Susy Kaur  04/07/21 [id number]

## 2021-04-08 ENCOUNTER — APPOINTMENT (OUTPATIENT)
Dept: VASCULAR LAB | Age: 76
DRG: 616 | End: 2021-04-08
Payer: MEDICARE

## 2021-04-08 PROBLEM — A49.1 GROUP B STREPTOCOCCAL INFECTION: Status: ACTIVE | Noted: 2021-04-08

## 2021-04-08 PROBLEM — E11.628 DIABETIC FOOT INFECTION (HCC): Status: ACTIVE | Noted: 2021-04-08

## 2021-04-08 PROBLEM — M86.472 CHRONIC OSTEOMYELITIS OF LEFT FOOT WITH DRAINING SINUS (HCC): Status: ACTIVE | Noted: 2021-04-08

## 2021-04-08 PROBLEM — L08.9 DIABETIC FOOT INFECTION (HCC): Status: ACTIVE | Noted: 2021-04-08

## 2021-04-08 LAB
ANION GAP SERPL CALCULATED.3IONS-SCNC: 7 MMOL/L (ref 3–16)
BLOOD BANK DISPENSE STATUS: NORMAL
BLOOD BANK DISPENSE STATUS: NORMAL
BLOOD BANK PRODUCT CODE: NORMAL
BLOOD BANK PRODUCT CODE: NORMAL
BPU ID: NORMAL
BPU ID: NORMAL
BUN BLDV-MCNC: 16 MG/DL (ref 7–20)
CALCIUM SERPL-MCNC: 8.2 MG/DL (ref 8.3–10.6)
CHLORIDE BLD-SCNC: 101 MMOL/L (ref 99–110)
CO2: 28 MMOL/L (ref 21–32)
CREAT SERPL-MCNC: 1.2 MG/DL (ref 0.6–1.2)
DESCRIPTION BLOOD BANK: NORMAL
DESCRIPTION BLOOD BANK: NORMAL
GFR AFRICAN AMERICAN: 53
GFR NON-AFRICAN AMERICAN: 44
GLUCOSE BLD-MCNC: 243 MG/DL (ref 70–99)
GLUCOSE BLD-MCNC: 248 MG/DL (ref 70–99)
GLUCOSE BLD-MCNC: 300 MG/DL (ref 70–99)
GLUCOSE BLD-MCNC: 326 MG/DL (ref 70–99)
GLUCOSE BLD-MCNC: 335 MG/DL (ref 70–99)
HCT VFR BLD CALC: 21 % (ref 36–48)
HCT VFR BLD CALC: 21.3 % (ref 36–48)
HCT VFR BLD CALC: 24.9 % (ref 36–48)
HEMOGLOBIN: 6.5 G/DL (ref 12–16)
HEMOGLOBIN: 6.6 G/DL (ref 12–16)
HEMOGLOBIN: 8.1 G/DL (ref 12–16)
MCH RBC QN AUTO: 24.4 PG (ref 26–34)
MCHC RBC AUTO-ENTMCNC: 31 G/DL (ref 31–36)
MCV RBC AUTO: 78.6 FL (ref 80–100)
PDW BLD-RTO: 18.2 % (ref 12.4–15.4)
PERFORMED ON: ABNORMAL
PLATELET # BLD: 326 K/UL (ref 135–450)
PMV BLD AUTO: 7.7 FL (ref 5–10.5)
POTASSIUM REFLEX MAGNESIUM: 5.1 MMOL/L (ref 3.5–5.1)
RBC # BLD: 2.72 M/UL (ref 4–5.2)
SODIUM BLD-SCNC: 136 MMOL/L (ref 136–145)
VANCOMYCIN TROUGH: 17.8 UG/ML (ref 10–20)
WBC # BLD: 11.5 K/UL (ref 4–11)

## 2021-04-08 PROCEDURE — 36415 COLL VENOUS BLD VENIPUNCTURE: CPT

## 2021-04-08 PROCEDURE — 97530 THERAPEUTIC ACTIVITIES: CPT

## 2021-04-08 PROCEDURE — 97162 PT EVAL MOD COMPLEX 30 MIN: CPT

## 2021-04-08 PROCEDURE — 6360000002 HC RX W HCPCS: Performed by: STUDENT IN AN ORGANIZED HEALTH CARE EDUCATION/TRAINING PROGRAM

## 2021-04-08 PROCEDURE — 6370000000 HC RX 637 (ALT 250 FOR IP): Performed by: STUDENT IN AN ORGANIZED HEALTH CARE EDUCATION/TRAINING PROGRAM

## 2021-04-08 PROCEDURE — 80048 BASIC METABOLIC PNL TOTAL CA: CPT

## 2021-04-08 PROCEDURE — 36430 TRANSFUSION BLD/BLD COMPNT: CPT

## 2021-04-08 PROCEDURE — 85014 HEMATOCRIT: CPT

## 2021-04-08 PROCEDURE — 80202 ASSAY OF VANCOMYCIN: CPT

## 2021-04-08 PROCEDURE — 85027 COMPLETE CBC AUTOMATED: CPT

## 2021-04-08 PROCEDURE — 93925 LOWER EXTREMITY STUDY: CPT

## 2021-04-08 PROCEDURE — 1200000000 HC SEMI PRIVATE

## 2021-04-08 PROCEDURE — 2580000003 HC RX 258: Performed by: STUDENT IN AN ORGANIZED HEALTH CARE EDUCATION/TRAINING PROGRAM

## 2021-04-08 PROCEDURE — 85018 HEMOGLOBIN: CPT

## 2021-04-08 PROCEDURE — 99223 1ST HOSP IP/OBS HIGH 75: CPT | Performed by: INTERNAL MEDICINE

## 2021-04-08 RX ORDER — DEXTROSE MONOHYDRATE 25 G/50ML
12.5 INJECTION, SOLUTION INTRAVENOUS PRN
Status: DISCONTINUED | OUTPATIENT
Start: 2021-04-08 | End: 2021-04-14 | Stop reason: HOSPADM

## 2021-04-08 RX ORDER — SODIUM CHLORIDE 9 MG/ML
INJECTION, SOLUTION INTRAVENOUS PRN
Status: DISCONTINUED | OUTPATIENT
Start: 2021-04-08 | End: 2021-04-14 | Stop reason: HOSPADM

## 2021-04-08 RX ORDER — DEXTROSE MONOHYDRATE 50 MG/ML
100 INJECTION, SOLUTION INTRAVENOUS PRN
Status: DISCONTINUED | OUTPATIENT
Start: 2021-04-08 | End: 2021-04-14 | Stop reason: HOSPADM

## 2021-04-08 RX ORDER — NICOTINE POLACRILEX 4 MG
15 LOZENGE BUCCAL PRN
Status: DISCONTINUED | OUTPATIENT
Start: 2021-04-08 | End: 2021-04-14 | Stop reason: HOSPADM

## 2021-04-08 RX ORDER — INSULIN LISPRO 100 [IU]/ML
10 INJECTION, SOLUTION INTRAVENOUS; SUBCUTANEOUS
Status: DISCONTINUED | OUTPATIENT
Start: 2021-04-08 | End: 2021-04-14

## 2021-04-08 RX ADMIN — LABETALOL HYDROCHLORIDE 100 MG: 100 TABLET, FILM COATED ORAL at 20:34

## 2021-04-08 RX ADMIN — OXYCODONE HYDROCHLORIDE 10 MG: 5 TABLET ORAL at 06:19

## 2021-04-08 RX ADMIN — INSULIN HUMAN 30 UNITS: 100 INJECTION, SUSPENSION SUBCUTANEOUS at 18:38

## 2021-04-08 RX ADMIN — OXYCODONE HYDROCHLORIDE 10 MG: 5 TABLET ORAL at 20:38

## 2021-04-08 RX ADMIN — INSULIN LISPRO 2 UNITS: 100 INJECTION, SOLUTION INTRAVENOUS; SUBCUTANEOUS at 20:34

## 2021-04-08 RX ADMIN — PIPERACILLIN AND TAZOBACTAM 3375 MG: 3; .375 INJECTION, POWDER, LYOPHILIZED, FOR SOLUTION INTRAVENOUS at 04:49

## 2021-04-08 RX ADMIN — INSULIN LISPRO 8 UNITS: 100 INJECTION, SOLUTION INTRAVENOUS; SUBCUTANEOUS at 12:50

## 2021-04-08 RX ADMIN — INSULIN LISPRO 10 UNITS: 100 INJECTION, SOLUTION INTRAVENOUS; SUBCUTANEOUS at 18:38

## 2021-04-08 RX ADMIN — PIPERACILLIN AND TAZOBACTAM 3375 MG: 3; .375 INJECTION, POWDER, LYOPHILIZED, FOR SOLUTION INTRAVENOUS at 16:01

## 2021-04-08 RX ADMIN — INSULIN LISPRO 8 UNITS: 100 INJECTION, SOLUTION INTRAVENOUS; SUBCUTANEOUS at 08:58

## 2021-04-08 RX ADMIN — VANCOMYCIN HYDROCHLORIDE 1250 MG: 10 INJECTION, POWDER, LYOPHILIZED, FOR SOLUTION INTRAVENOUS at 01:52

## 2021-04-08 RX ADMIN — PIPERACILLIN AND TAZOBACTAM 3375 MG: 3; .375 INJECTION, POWDER, LYOPHILIZED, FOR SOLUTION INTRAVENOUS at 23:57

## 2021-04-08 RX ADMIN — INSULIN LISPRO 4 UNITS: 100 INJECTION, SOLUTION INTRAVENOUS; SUBCUTANEOUS at 18:38

## 2021-04-08 RX ADMIN — LABETALOL HYDROCHLORIDE 100 MG: 100 TABLET, FILM COATED ORAL at 08:57

## 2021-04-08 RX ADMIN — INSULIN LISPRO 10 UNITS: 100 INJECTION, SOLUTION INTRAVENOUS; SUBCUTANEOUS at 12:50

## 2021-04-08 RX ADMIN — VANCOMYCIN HYDROCHLORIDE 1000 MG: 10 INJECTION, POWDER, LYOPHILIZED, FOR SOLUTION INTRAVENOUS at 20:48

## 2021-04-08 RX ADMIN — ATORVASTATIN CALCIUM 20 MG: 20 TABLET, FILM COATED ORAL at 20:34

## 2021-04-08 RX ADMIN — Medication 20 ML: at 20:46

## 2021-04-08 ASSESSMENT — PAIN DESCRIPTION - PROGRESSION
CLINICAL_PROGRESSION: NOT CHANGED

## 2021-04-08 ASSESSMENT — PAIN DESCRIPTION - FREQUENCY
FREQUENCY: INTERMITTENT
FREQUENCY: INTERMITTENT

## 2021-04-08 ASSESSMENT — PAIN DESCRIPTION - DESCRIPTORS
DESCRIPTORS: SHARP
DESCRIPTORS: SHARP

## 2021-04-08 ASSESSMENT — PAIN SCALES - GENERAL
PAINLEVEL_OUTOF10: 8
PAINLEVEL_OUTOF10: 0

## 2021-04-08 ASSESSMENT — PAIN DESCRIPTION - PAIN TYPE
TYPE: ACUTE PAIN;SURGICAL PAIN
TYPE: SURGICAL PAIN;ACUTE PAIN

## 2021-04-08 ASSESSMENT — PAIN DESCRIPTION - ONSET: ONSET: ON-GOING

## 2021-04-08 ASSESSMENT — PAIN DESCRIPTION - ORIENTATION: ORIENTATION: LEFT

## 2021-04-08 NOTE — CARE COORDINATION
Case Management Assessment           Initial Evaluation                Date / Time of Evaluation: 4/8/2021 2:00 PM                 Assessment Completed by: Paul Perez    Patient Name: David Abraham     YOB: 1945  Diagnosis: Osteomyelitis [facility]) [M86.9]     Date / Time: 4/6/2021 11:26 PM    Patient Admission Status: Inpatient    If patient is discharged prior to next notation, then this note serves as note for discharge by case management. Current PCP: Susan Montero MD  Clinic Patient: No    Chart Reviewed: Yes  Patient/ Family Interviewed: Yes    Initial assessment completed at bedside with: pt at bedside    Hospitalization in the last 30 days: No    Emergency Contacts:  Extended Emergency Contact Information  Primary Emergency Contact: Dima Isabel, [address] Phone: [phone]  Relation: Other    Advance Directives:   Code Status: Full [address]: No  Agent: NA  Contact Number: NA    Copy present: No     In paper Chart: No    Scanned into EMR No    Financial  Payor: Shanon Running / Plan: Alma Rosa Gonzalez ESSENTIAL/PLUS / Product Type: *No Product type* /     Pre-cert required for SNF: Yes    Pharmacy    [facility] [address], [address] [address]  Phone: [phone] Fax: [phone]      Potential assistance Purchasing Medications:    Does Patient want to participate in local refill/ meds to beds program?:      Meds To Beds General Rules:  1. Can ONLY be done Monday- Friday between 8:30am-5pm  2. Prescription(s) must be in pharmacy by 3pm to be filled same day  3. Copy of patient's insurance/ prescription drug card and patient face sheet must be sent along with the prescription(s)  4. Cost of Rx cannot be added to hospital bill. If financial assistance is needed, please contact unit  or ;   or  CANNOT provide pharmacy voucher for patients co-pays  5. Patients can then  the prescription on their way out of the hospital at discharge, or pharmacy can deliver to the bedside if staff is available. (payment due at time of pick-up or delivery - cash, check, or card accepted)     Able to afford home medications/ co-pay costs: Yes    ADLS  Support Systems:      PT AM-PAC: 12 /24  OT AM-PAC:   /24    [place]: home  Alone   Steps: 3 steps. Plans to RETURN to current housing: No  Barriers to RETURNING to current housing: safety    Home Care Information  Currently ACTIVE with Home Health Care: No  Home Care Agency: Not Applicable    Currently ACTIVE with [place] on Aging: No  Passport/ Waiver: No  Passport/ Waiver Services: Not Applicable    : [facility]FF[facility] Phone: TIFFANY      Durable Medical Equipment  DME Provider: TIFFANY  Equipment: walker    Home Oxygen and Respiratory Equipment  Has HOME OXYGEN prior to admission: No  Oxygen Company: Not Applicable  Other Respiratory Equipment: NA    Informed of need to bring portable home O2 tank on day of DISCHARGE for nursing to connect prior to leaving: No  Verbalized agreement/Understanding: No  Person to bring portable tank at discharge: NA    Dialysis  Active with HD/PD prior to admission: No  Nephrologist: Yuan Ram [address]:  Not Applicable    DISCHARGE PLAN:  Disposition: [facility] ([facility]): referrals  faxed : pending  Phone: . Fax: Clement Fly     Transportation PLAN for discharge: EMS transportation     Factors facilitating achievement of predicted outcomes: Cooperative and Pleasant    Barriers to discharge: No family support, Lower extremity weakness and Wound Care:  Poor  Living situation  APS  Involved     Additional Case Management Notes:     CM met with pt  Who states she lives alone and has  APS involved  Re: her  Current living situation ,  Her  Pipes  Burst at home , has no running water  And been using a BSC; and her  Roof collapsed  Upstairs and can no longer  Go up stairs and reported she has  Been sleeping a the kitchen table. She has  No current  Services in the home she states no one can even come into the home :  She reports no family support ( has a sisiter  But is not in contact with her )   , only support is her  Long time friend  Who lives  In [place] , Owen Schwab  [phone] and (her spouse Zach Diego [phone] ). She is  Agreeable she cannot return home and agreeable to SNF  Multiple referrals made in the [address] [place] [address]  : per her request to be close to her friend : [facility]:       ANGELES received  Message  From Sherrill Karimi  re: follow up and D/C planning :  He  Is also questioning her  Capacity for  Decision making:    CM called  To follow up and discuss with him planning and   3rd party info re: her  Home living situation:  , Left  VM : awaiting return call:  CM received  2 messages  From Neighbors  Of the patient to report  Living conditions and states they are not  Sure how honest  She will be  With her current living  situation and wanted  SW to be aware ( he stated  He  Was not wanting to know anyhting re:  Medical condition or HIPPA  But that we were  Aware of her : conditions:  Victor Manuel You  [phone] and his brother [phone]  ). CM informed  APS  Of the statements  Provided  By neighbors  And if he needed to speak or contact them . She states  Sherrill Karimi is her Contact  With [address] [place] [address] Dr Family services ( APS )  [phone]    (Addendum: also received  a voicemail from [facility] regarding Floreen Covert in [phone]. She was calling to report to the CM regarding the patients living situation.  Her son is the patients neighbor.)    The Plan for Transition of Care is related to the following treatment goals of Osteomyelitis [facility]) [M86.9]    The Patient and/or patient representative Han Sherman and her family were provided with a choice of provider and agrees with the discharge plan Yes    Freedom of choice list was provided with basic dialogue that supports the patient's individualized plan of care/goals and shares the quality data associated with the providers.  Yes    Care Transition patient: No    Henrietta Gordon RN  [facility]  Case Management Department  Ph: [phone]

## 2021-04-08 NOTE — PLAN OF CARE
Problem: Falls - Risk of:  Goal: Will remain free from falls  Description: Will remain free from falls  4/7/2021 2233 by Ryan Raygoza RN  Outcome: Ongoing  Note: Pt at risk for falls r/t weakness and limited mobility. Pt placed in fall risk precautions, bed alarm In place, bed in lowest locked position, call light in reach. RN encouraged pt to stay in bed and use call light to express needs.

## 2021-04-08 NOTE — CONSULTS
Infectious Diseases Inpatient Consult Note    Reason for Consult:   L DM foot infection  Requesting Physician:   Dr Delilah Lewis  Primary Care Physician:  Mikael Morocho MD  History Obtained From:   Pt, EPIC    Admit Date: 4/6/2021  Hospital Day: 3    CHIEF COMPLAINT:       Chief Complaint   Patient presents with    Leg Pain       HISTORY OF PRESENT ILLNESS:      77 yo woman with hx DM, neuropathy, obesity (BMI 43), HTN, HL  Prior DM foot amputation     Presents with L foot swelling, redness and pain, increasing over weeks  Assoc with fever / chills. Had purulent drainage. Pain became too severe to walk.     Has not had medical or podiatry care (Podiatrist retired per pt)    In ED 4/6, afeb, WBC   X-ray with 5th digit / distal MT OM  Admit, started on vancomycin + zosyn  Seen by Podiatry, taken to OR 4/7 - 5th MT resection    Today 4/8, discomfort / 'tingling' at surg site      Past Medical History:    Past Medical History:   Diagnosis Date    Acute respiratory failure with hypoxia ([address] 11/30/2019    Hx of blood clots 11/2019    PE    Hyperlipidemia     Hypertension     Mallet finger 3/4/2015    PONV (postoperative nausea and vomiting)     Type II or unspecified type diabetes mellitus without mention of complication, not stated as uncontrolled        Past Surgical History:    Past Surgical History:   Procedure Laterality Date    BREAST BIOPSY Right     FOOT DEBRIDEMENT Left 4/7/2021    LEFT LOWER EXTREMITY FOOT DEBRIDEMENT INCISION AND DRAINAGE WITH  5TH METATARSAL RESECTION performed by Fred Cherry DPM at [facility] Right 10/30/2020    PARTIAL AMPUTATION 5TH RAY RIGHT FOOT performed by Solomon Cifuentes DPM at [address]       Current Medications:     [START ON 4/9/2021] irrigation builder   Irrigation Once    insulin NPH  30 Units Subcutaneous BID AC    insulin lispro  10 Units Subcutaneous TID WC    vancomycin  1,000 mg Intravenous Q12H    sodium chloride flush  5-40 mL Intravenous 2 times per day    piperacillin-tazobactam  3,375 mg Intravenous Q8H    insulin lispro  0-12 Units Subcutaneous TID WC    insulin lispro  0-6 Units Subcutaneous Nightly    enoxaparin  40 mg Subcutaneous Daily    labetalol  100 mg Oral BID    atorvastatin  20 mg Oral Nightly       Allergies:  Latex, Adhesive tape, Aleve [naproxen sodium], Cozaar [losartan potassium], Iodine, Lasix [furosemide], Lisinopril, Metformin and related, Nifedipine, Norvasc [amlodipine besylate], Novolog [insulin aspart], Pioglitazone, and Zoloft [sertraline hcl]    Social History:    TOBACCO:    None - remote cig  ETOH:    Occasional   DRUGS:   None   MARITAL STATUS:   Single   OCCUPATION:   None     Family History:   No immunodeficiency    REVIEW OF SYSTEMS:    No fever / chills / sweats. No weight loss. No visual change, eye pain, eye discharge. No oral lesion, sore throat, dysphagia. Denies cough / sputum. Denies chest pain, palpitations. Denies n / v / abd pain. No diarrhea. Denies dysuria or change in urinary function. Denies joint swelling or pain. No myalgia, arthralgia. Denies skin changes, itching  Denies focal weakness, sensory change or other neurologic symptom    Denies new / worse depression, psychiatric symptoms    PHYSICAL EXAM:      Vitals:    /72   Pulse 75   Temp 98 °F (36.7 °C)   Resp 16   Ht 5' 4\" (1.626 m)   Wt 256 lb (116.1 kg)   LMP  (LMP Unknown)   SpO2 98%   BMI 43.94 kg/m²     GENERAL: No apparent distress.     HEENT: Membranes moist, no oral lesion, PERRL  NECK:  Supple, no lymphadenopathy  LUNGS: Clear b/l, no rales, no dullness  CARDIAC: RRR, no murmur appreciated  ABD:  + BS, soft / NT  EXT:  Bilateral foot dressing / ACE   NEURO: No focal neurologic findings  PSYCH: Orientation, sensorium, mood normal  LINES:  Peripheral iv    DATA:    Lab Results   Component Value Date    WBC 11.5 (H) 04/08/2021    HGB 8.1 (L) 04/08/2021    HCT 24.9 (L) 04/08/2021    MCV 78.6 (L) 04/08/2021  2021     Lab Results   Component Value Date    CREATININE 1.2 2021    BUN 16 2021     2021    K 5.1 2021     2021    CO2 28 2021       Hepatic Function Panel:   Lab Results   Component Value Date    ALKPHOS 63 10/28/2020    ALT 9 10/28/2020    AST 11 10/28/2020    PROT 6.7 10/28/2020    BILITOT <0.2 10/28/2020    BILIDIR <0.2 2019    IBILI see below 2019    LABALBU 2.8 10/28/2020       Micro:   Surg cult: GBS x 2   Surg cult 'bone': light M morganii   Wound 'ulcer': M morganii, P stuartii, GBS   BC x 2 no growth to date     Imagin/7 L foot x-ray:  1.  Lucencies in 5th metatarsal and 5th proximal phalanx, concerning for   osteomyelitis.  Cannot exclude superimposed fracture. 2.  Soft tissue swelling.  Lateral foot soft tissue air. 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 (HCC)    Class 3 severe obesity due to excess calories without serious comorbidity with body mass index (BMI) of 45.0 to 49.9 in adult ([address]    Hypoxia    Acute pulmonary embolism (HCC)    Dyspnea on exertion    Calcification of gallbladder    Osteomyelitis (HCC)       Hx DM, neuropathy, obesity (BMI 43), HTN, HL  Prior DM foot amputation     L foot infection - limb threatening with extension osteomyelitis   POD#1 debridement   Culture polymicrobial    RECOMMENDATIONS:    Cont zosyn  D/c vancomycin  Will f/u on cult - await sensitivities  Wound care and further surg per Podiatry  Await OR note     Will confer with Podiatry.   Antibiotic plan will depend if resection felt to be definitive  Discussed with pt, RN  Ashlee Pappas MD

## 2021-04-08 NOTE — PROGRESS NOTES
Hospitalist Progress Note      PCP: Dewey Mendoza MD    Date of Admission: 4/6/2021    Chief Complaint: Foot Pain     Hospital Course: 75yo female PMH IDDM complicated by neuropathy and chronic foot wounds and comorbid obesity, HTN, HLD, and chronic venous stasis. Admitted for diabetic foot ulcer with infection and underlying osteomyelitis. Was taken to OR on day 1 of admission with Podiatry for debridement and resection of nonviable bone. Pt tolerated the procedure well. However the morning after the procedure had significant anemia with Hb 6.6 from 8.6. Was ordered 2U PRBCs to be transfused. Subjective: Doing well today POD1. Has significant anemia with Hb 6.6, ordered 2U PRBCs for transfusion. Pt has no complaints. She will have a wound vac by tmro per Podiatry. Denies any fevers, chills, chest pain, palpitations, leg swelling, cough, shortness of breath, difficulty breathing, wheezing, abdominal pain, nausea, vomiting, diarrhea.     Medications:  Reviewed    Infusion Medications    sodium chloride      sodium chloride      lactated ringers Stopped (04/07/21 1553)     Scheduled Medications    [START ON 4/9/2021] irrigation builder   Irrigation Once    sodium chloride flush  5-40 mL Intravenous 2 times per day    piperacillin-tazobactam  3,375 mg Intravenous Q8H    insulin lispro  0-12 Units Subcutaneous TID WC    insulin lispro  0-6 Units Subcutaneous Nightly    insulin glargine  30 Units Subcutaneous Nightly    enoxaparin  40 mg Subcutaneous Daily    vancomycin  15 mg/kg (Adjusted) Intravenous Q12H    labetalol  100 mg Oral BID    atorvastatin  20 mg Oral Nightly     PRN Meds: sodium chloride, sodium chloride flush, sodium chloride, promethazine **OR** ondansetron, polyethylene glycol, acetaminophen **OR** acetaminophen, oxyCODONE **OR** oxyCODONE      Intake/Output Summary (Last 24 hours) at 4/8/2021 1056  Last data filed at 4/8/2021 0400  Gross per 24 hour   Intake 940 ml Output 0 ml   Net 940 ml       Physical Exam Performed:    BP (!) 143/75   Pulse 73   Temp 97.9 °F (36.6 °C)   Resp 16   Ht 5' 4\" (1.626 m)   Wt 256 lb (116.1 kg)   LMP  (LMP Unknown)   SpO2 96%   BMI 43.94 kg/m²     General appearance: No apparent distress, appears stated age and cooperative. HEENT: Pupils equal, round, and reactive to light. Conjunctivae/corneas clear. Neck: Supple, with full range of motion. No jugular venous distention. Trachea midline. Respiratory:  Normal respiratory effort. Clear to auscultation, bilaterally without Rales/Wheezes/Rhonchi. Cardiovascular: Regular rate and rhythm with normal S1/S2 without murmurs, rubs or gallops. Abdomen: Soft, non-tender, non-distended with normal bowel sounds. Musculoskeletal: No clubbing, cyanosis or edema bilaterally. Full range of motion without deformity. Skin: Skin color, texture, turgor normal.  No rashes or lesions. Neurologic:  Neurovascularly intact without any focal sensory/motor deficits. Cranial nerves: II-XII intact, grossly non-focal.  Psychiatric: Alert and oriented, thought content appropriate, normal insight  Capillary Refill: Brisk,< 3 seconds   Peripheral Pulses: +2 palpable, equal bilaterally       Labs:   Recent Labs     04/07/21  0056 04/07/21  1613 04/08/21  0616 04/08/21  0755   WBC 11.3*  --  11.5*  --    HGB 8.6* 7.5* 6.6* 6.5*   HCT 27.0* 24.1* 21.3* 21.0*     --  326  --      Recent Labs     04/07/21  0056 04/07/21  0312 04/08/21  0616   *  --  136   K 5.6* 4.6 5.1   CL 98*  --  101   CO2 27  --  28   BUN 19  --  16   CREATININE 1.0  --  1.2   CALCIUM 8.7  --  8.2*     No results for input(s): AST, ALT, BILIDIR, BILITOT, ALKPHOS in the last 72 hours. Recent Labs     04/07/21  0312   INR 1.08     No results for input(s): Kp Killings in the last 72 hours.     Urinalysis:      Lab Results   Component Value Date    NITRU Negative 11/30/2019    WBCUA 10-20 11/30/2019    BACTERIA 2+ 11/30/2019 RBCUA 3-5 11/30/2019    BLOODU TRACE-INTACT 11/30/2019    SPECGRAV 1.020 11/30/2019    GLUCOSEU 100 11/30/2019       Radiology:  XR FOOT LEFT (MIN 3 VIEWS)   Final Result      Status post resection of the fifth metatarsal.      Abnormal lucency in the base of the proximal phalanx of the fifth toe consistent with osteomyelitis. Correlate clinically. XR CHEST PORTABLE   Final Result     No acute findings in the chest.          XR FOOT LEFT (MIN 3 VIEWS)   Final Result   1. Lucencies in 5th metatarsal and 5th proximal phalanx, concerning for    osteomyelitis. Cannot exclude superimposed fracture. 2.  Soft tissue swelling. Lateral foot soft tissue air. MRI FOOT LEFT WO CONTRAST    (Results Pending)   MRI ANKLE LEFT WO CONTRAST    (Results Pending)   VL DUP LOWER EXTREMITY ARTERIES BILATERAL    (Results Pending)           Assessment/Plan:    Active Hospital Problems    Diagnosis Date Noted    Osteomyelitis ([facility] [address] [M86.9] 04/07/2021     Infected Diabetic Foot Ulcer with suspected Osteomyelitis  - POD1 for amputation of left 5th ray with Podiatry, plan for wound vac tmro, and further staged procedures this admission  - Vancomycin and Zosyn  - dressing and wound care per Podiatry  - trending CRP  - Roxicodone 5,10mg pain panel     Insulin Dependant Diabetes Mellitus  - Lantus 30U HS  - Medium Dose SSI AC HS  - POC Glucose AC HS   - hypoglycemia protocol      Hx of 8mm lung base nodule  - outpatient fu for further imaging    Morbid Obesity: Body mass index is 43.94 kg/m². Cameron Colder Complicating assessment and treatment. Placing patient at risk for multiple co-morbidities as well as early death and contributing to the patient's presentation.  Counseled on diet, weight loss    Other Chronic Issues  HTN- labetalol 100mg BID  HLD - lipitor 20mg daily       DVT Prophylaxis: Lovenox  Diet: Dietary Nutrition Supplements: Wound Healing Oral Supplement, Diabetic Oral Supplement  DIET CARB CONTROL; Carb Control: 4 carb choices (60 gms)/meal  Code Status: Full Code    PT/OT Eval Status: pending    Dispo - inpatient    This patient will be staffed and discussed with MD Jesus Serrano, DO PGY2

## 2021-04-08 NOTE — PROGRESS NOTES
Podiatric Surgery Daily Progress Note  Rylee Choi      Subjective :   Patient seen and examined this am at the bedside. Patient denies any acute overnight events. Patient denies N/V/F/C/SOB. Patient denies calf pain, thigh pain, chest pain. Review of Systems: A 12 point review of symptoms is unremarkable with the exception of the chief complaint. Patient specifically denies nausea, fever, vomiting, chills, shortness of breath, chest pain, abdominal pain, constipation or difficulty urinating. Objective     /71   Pulse 76   Temp 99.3 °F (37.4 °C) (Oral)   Resp 16   Ht 5' 4\" (1.626 m)   Wt 256 lb (116.1 kg)   LMP  (LMP Unknown)   SpO2 96%   BMI 43.94 kg/m²      I/O:    Intake/Output Summary (Last 24 hours) at 4/8/2021 0745  Last data filed at 4/8/2021 0400  Gross per 24 hour   Intake 940 ml   Output 0 ml   Net 940 ml              Wt Readings from Last 3 Encounters:   04/07/21 256 lb (116.1 kg)   12/29/20 257 lb (116.6 kg)   12/22/20 157 lb (71.2 kg)       LABS:    Recent Labs     04/07/21  0056 04/07/21  1613 04/08/21  0616   WBC 11.3*  --  11.5*   HGB 8.6* 7.5* 6.6*   HCT 27.0* 24.1* 21.3*     --  326        Recent Labs     04/08/21  0616      K 5.1      CO2 28   BUN 16   CREATININE 1.2        Recent Labs     04/07/21  0312   INR 1.08           LOWER EXTREMITY EXAMINATION    Dressing to bilateral LE intact. No strikethrough noted to the external dressing. Moderate sanguineous drainage noted to the internal layers of the dressing LLE. VASCULAR: DP and PT pulses nonpalpable bilateral.   Upon Doppler examination, DP signals monophasic and PT signals biphasic bilateral. CFT is sluggish to the digits of the foot b/l. Skin temperature is warm to warm left lower extremity from proximal to distal with  focal calor noted globally to the left foot, slightly improved from yesterday. Skin temperature warm to cool right lower extremity.   +2 pitting edema noted left lower extremity, +1 pitting edema noted right lower extremity. No pain with calf compression b/l.     NEUROLOGIC: Gross and epicritic sensation is diminished b/l. Protective sensation is diminished at all pedal sites b/l.     DERMATOLOGIC: Diffuse dermatologic changes noted bilateral lower extremity. Right lower extremity is a closed soft tissue envelope.           Left lower extremity:  Open surgical incision noted to previous full thickness ulcerations lateral aspect of foot extending distally from cuboid to distal aspect of foot with bone exposed. Skin edges with mild dehiscence at the plantar proximal aspect. Mild sanguinous drainage noted. Malodor decreased from yesterday. No fluctuance, or crepitus noted. Erythema slightly decreased from yesterday but remains to the foot and extends proximally to the level of the mid tibia. Patient provided verbal consent for today's photos. MUSCULOSKELETAL: Muscle strength is 5/5 for all pedal groups tested. Diffuse tenderness with palpation of the periwound and globally left foot. Ankle joint ROM is decreased in dorsiflexion with the knee extended. History of previous partial fifth ray amputation right foot. IMAGING:  Xray left foot 4/7/2021 - post-op  Narrative   LEFT FOOT:       HISTORY: Postop study.       TECHNIQUE: 3 views obtained.       COMPARISON: 4/17/2021.       FINDINGS: There has been surgical resection of the fifth metatarsal. Lucency in the base of the proximal phalanx of the fifth toe is noted, with lateral and proximal cortical loss.       There is localized soft tissue swelling with scattered gas consistent with postoperative changes in the lateral aspect of the foot.       Other bony structures appear intact.           Impression       Status post resection of the fifth metatarsal.       Abnormal lucency in the base of the proximal phalanx of the fifth toe consistent with osteomyelitis.  Correlate clinically.                   Xray left foot 4/7/2021 - pre-op  Narrative       Patient: Jeremías Sargent  Time Out: 01:11   Exam(s): FILM LEFT FOOT        EXAM:     XR Left Foot Complete, 3 or More Views       CLINICAL HISTORY:     ulcer, infection.       TECHNIQUE:     Frontal, lateral and oblique views of the left foot.       COMPARISON:     No relevant prior studies available.       FINDINGS:     Bones/joints:  Lucencies in 5th metatarsal and 5th proximal phalanx,    concerning for osteomyelitis.  Cannot exclude superimposed fracture.  No    dislocation.     Soft tissues:  Soft tissue swelling.  Lateral foot soft tissue air.  No    radiopaque foreign body.           Electronically signed by Gabby Alegria M.D. on 04-07-21 at 0111       Impression   1.  Lucencies in 5th metatarsal and 5th proximal phalanx, concerning for    osteomyelitis.  Cannot exclude superimposed fracture. 2.  Soft tissue swelling.  Lateral foot soft tissue air.                 ASSESSMENT/PLAN  -S/P Left foot I&D with 5th metatarsal resection packed open 2/2 Diabetic foot ulceration, OM and gas gangrene, left foot, Earl 3  -Cellulitis, left lower extremity  -Peripheral arterial disease, bilateral lower extremity  -Peripheral vascular disease, bilateral lower extremity  -Diabetes mellitus type 2 with peripheral neuropathy    -Patient seen and evaluated at the bedside this AM.  -VSS. Leukocytosis noted (WBC 11.5). -Hemoglobin down to 6.6 from 7.5 yesterday. Hemoglobin to be redrawn. Will transfuse based on results of repeat lab draw. -.8. ESR greater than 120.  -Wound culture obtained; will follow up results  -Surgical culture; preliminary results show gram-positive cocci resembling strep and gram-positive rods  -Surgical pathology pending; will follow up results  -X-rays reviewed and impressions noted above.     -Order left foot and ankle MRI; will follow results  -Order arterial duplex bilateral; will follow up results  -Dressing applied left lower extremity consisting of Adaptic, wet-to-dry, gauze, ABD, Kerlix, Ace bandage.  -Dressing applied to right lower extremity consisting of Adaptic, gauze, Kerlix, Ace bandage.  -Plan for instill wound VAC to be applied tomorrow.  -Continue antibiotics.   -Social work consulted for discharge planning and likely SNF placement. -PT/OT ordered; will follow-up results. -Nonweightbearing left lower extremity. DISPO:S/P Left foot I&D with 5th metatarsal resection packed open. This is a staged procedure, with further debridement and surgical intervention required while in house. Ordered MRI and arterial duplex; will follow up results. Plan for instill wound VAC tomorrow. Will continue to follow patient closely. Patient seen and evaluated at the bedside with Dr. Mohit Logan DPM.      Amanda Zuniga 26 04/08/21  7:45 AM    Patient was seen and evaluated at bedside. Agree with residents assessment and treatment plan. Prolonged discussion with patient regarding treatment plan. Due to her severe infection she has a large soft tissue deficit noted on the plantar lateral aspect of her left foot. Discussed with patient she will need to maintain strict nonweightbearing to allow for healing. Patient will be seen and evaluated by physical and Occupational Therapy to assess the need for long-term care placement.   Mohit Logan DPM

## 2021-04-08 NOTE — PROGRESS NOTES
Occupational Therapy      Order received, chart reviewed. Testing being performed at bedside. Will follow up 4/9.     Derrell Laurent OTR/L #[id number]

## 2021-04-08 NOTE — PROGRESS NOTES
Physical Therapy    Facility/Department: [address] [place] [address]  Initial Assessment/Treatment    NAME: Rylee Choi  :   MRN: [id number]    Date of Service: 2021    Discharge Recommendations:  Rylee Choi scored a  on the AM-PAC short mobility form. Current research shows that an AM-PAC score of 17 or less is typically not associated with a discharge to the patient's home setting. Based on the patient's AM-PAC score and their current functional mobility deficits, it is recommended that the patient have 3-5 sessions per week of Physical Therapy at d/c to increase the patient's independence. Please see assessment section for further patient specific details. If patient discharges prior to next session this note will serve as a discharge summary. Please see below for the latest assessment towards goals. Patient would benefit from continued therapy after discharge   PT Equipment Recommendations  Equipment Needed: (defer)    Assessment   Body structures, Functions, Activity limitations: Decreased functional mobility ; Decreased ADL status; Decreased safe awareness;Decreased endurance;Decreased balance  Assessment: Pt functioning below baseline at this time. Requiring mod to max A for transfers. Limited by NWB left LE & easily fatigued. Significant safety concerns for pt to return home alone. Strongly recommend further inpt PT upon D/C. Will cont PT while here to maximize mobility & independence. Treatment Diagnosis: impaired functional mobility  Prognosis: Good  Decision Making: Medium Complexity  PT Education: Goals;PT Role;Plan of Care;Transfer Training;General Safety;Weight-bearing Education; Functional Mobility Training  REQUIRES PT FOLLOW UP: Yes  Activity Tolerance  Activity Tolerance: Patient Tolerated treatment well;Patient limited by fatigue;Patient limited by endurance       Patient Diagnosis(es): The primary encounter diagnosis was Other acute osteomyelitis of left foot ([facility] [address]  A diagnosis of Sepsis due to cellulitis [facility]) was also pertinent to this visit. has a past medical history of Acute respiratory failure with hypoxia ([address] Hx of blood clots, Hyperlipidemia, Hypertension, Mallet finger, PONV (postoperative nausea and vomiting), and Type II or unspecified type diabetes mellitus without mention of complication, not stated as uncontrolled. has a past surgical history that includes Breast biopsy (Right); Toe amputation (Right, 10/30/2020); and Foot Debridement (Left, 4/7/2021). Restrictions  Position Activity Restriction  Other position/activity restrictions: up with assistance, NWB left LE  Vision/Hearing  Vision: Impaired  Vision Exceptions: Wears glasses at all times  Hearing: Within functional limits     Subjective  General  Chart Reviewed: Yes  Patient assessed for rehabilitation services?: Yes  Additional Pertinent Hx: PMH: HTN, DM, PVD, left foot infection. Pt admitted with left foot pain. Dx: osteomyelitis. s/p: LEFT LOWER EXTREMITY FOOT DEBRIDEMENT INCISION AND DRAINAGE WITH PARTIAL 5TH RAY RESECTION on 4/7. Family / Caregiver Present: No  Referring Practitioner: Stella Palomo  Referral Date : 04/08/21  Diagnosis: osteomyelitis left foot  Follows Commands: Within Functional Limits  Subjective  Subjective: Pt supine in bed & agreeable to PT.   Pain Screening  Patient Currently in Pain: Yes(4/10 left LE)  Vital Signs  Patient Currently in Pain: Yes(4/10 left LE)       Orientation  Orientation  Overall Orientation Status: Within Normal Limits  Social/Functional History  Social/Functional History  Lives With: Alone  Type of Home: House  Home Layout: Two level, Able to Live on Main level with bedroom/bathroom, 1/2 bath on main level(pt hasn't gone upstairs in ~2 yrs)  Home Access: Stairs to enter with rails  Entrance Stairs - Number of Steps: 4 + 6  Entrance Stairs - Rails: (both sides on first 4, right side on next 6)  Bathroom Shower/Tub: Tub/Shower unit  Bathroom Toilet: Standard(not working for past several weeks d/t plumbing issues. pt using BSC.)  Bathroom Equipment: Commode  Home Equipment: Cane  ADL Assistance: Independent(sponge bathing)  Homemaking Assistance: Independent  Ambulation Assistance: Independent  Transfer Assistance: Independent  Active : Yes  Additional Comments: pt has no available assistance. has brother in [place] & sister who will not help her. pt's home currently without working plumbing, \"pipes froze in [place]"; caved in . [address] from water leaking to 1st floor. job & family services to assist with repairs?  pt has been sleeping at kitchen table d/t not being able to go upstairs. no falls. pt gets own grocerires. Objective          AROM RLE (degrees)  RLE AROM: WNL  AROM LLE (degrees)  LLE General AROM: hip & knee WFL, ankle NT d/t sx  Strength RLE  Strength RLE: WFL  Strength LLE  Comment: NT        Bed mobility  Supine to Sit: Minimal assistance(HOB elevated, pt using rail.)  Scooting: Stand by assistance(seated)  Transfers  Sit to Stand: Maximum Assistance(from bed, chair. verbal cues required.)  Stand to sit: Moderate Assistance(verbal cues required)  Stand Pivot Transfers: Moderate Assistance(bed->chair with RW, pt scooting right foot, unable to hop.)  Comment: pt impulsive with transfers. needing cues to slow down & listen to instructions.   Ambulation  Ambulation?: No(pt unable)     Balance  Sitting - Static: Good  Sitting - Dynamic: Good  Standing - Static: Fair(pt stood with RW for 1 min & min A. pt able to maintain NWB left LE.)  Standing - Dynamic: Poor        Plan   Plan  Times per week: 2-5  Current Treatment Recommendations: Functional Mobility Training, Transfer Training, Gait Training, Balance Training, Endurance Training, Safety Education & Training  Safety Devices  Type of devices: Call light within reach, Chair alarm in place, Left in chair, Nurse notified                                                     AM-PAC Score  AM-PAC Inpatient Mobility Raw Score : 12 (04/08/21 1138)  AM-PAC Inpatient T-Scale Score : 35.33 (04/08/21 1138)  Mobility Inpatient CMS 0-100% Score: 68.66 (04/08/21 1138)  Mobility Inpatient CMS G-Code Modifier : CL (04/08/21 1138)          Goals  Short term goals  Time Frame for Short term goals: D/C  Short term goal 1: supine<->sit SBA  Short term goal 2: sit<->stand min A  Short term goal 3: bed<->chair min A  Short term goal 4: pt will tolerate gait assessment  Patient Goals   Patient goals : to get well       Therapy Time   Individual Concurrent Group Co-treatment   Time In 0955         Time Out 1109         Minutes 1420 [facility],

## 2021-04-08 NOTE — CARE COORDINATION
CM/SW  following for  D/C planning :  CM spoke with pt at bedside  And  Also called her best friend Roxann  To assist with a  Disposition for  Referrals   They are agreeable to SNF at  D/c and prefer something  [facility] near  [address] no preference  ,  [place] BCBS  Floor to SNF list provided CM referenced  Medicare . gov for  SNF [facility]. And referrals; Pending - Request Sent     ANGELES Elam         [facility] [address] [facility]         ANGELES Nolasco [address]           Electronically signed by César Tapia, RN on 4/8/2021 at 2:23 PM       César Tapia  RN Case Manager  [facility]  [address] [address] [facility].   [address] [place] [address]  [phone]  Fax [phone]

## 2021-04-09 LAB
ANION GAP SERPL CALCULATED.3IONS-SCNC: 7 MMOL/L (ref 3–16)
BASOPHILS ABSOLUTE: 0 K/UL (ref 0–0.2)
BASOPHILS RELATIVE PERCENT: 0.5 %
BUN BLDV-MCNC: 16 MG/DL (ref 7–20)
CALCIUM SERPL-MCNC: 8.3 MG/DL (ref 8.3–10.6)
CHLORIDE BLD-SCNC: 101 MMOL/L (ref 99–110)
CO2: 29 MMOL/L (ref 21–32)
CREAT SERPL-MCNC: 1.2 MG/DL (ref 0.6–1.2)
EOSINOPHILS ABSOLUTE: 0.2 K/UL (ref 0–0.6)
EOSINOPHILS RELATIVE PERCENT: 2.2 %
GFR AFRICAN AMERICAN: 53
GFR NON-AFRICAN AMERICAN: 44
GLUCOSE BLD-MCNC: 199 MG/DL (ref 70–99)
GLUCOSE BLD-MCNC: 200 MG/DL (ref 70–99)
GLUCOSE BLD-MCNC: 201 MG/DL (ref 70–99)
GLUCOSE BLD-MCNC: 239 MG/DL (ref 70–99)
GLUCOSE BLD-MCNC: 246 MG/DL (ref 70–99)
HCT VFR BLD CALC: 24.4 % (ref 36–48)
HEMOGLOBIN: 7.8 G/DL (ref 12–16)
LYMPHOCYTES ABSOLUTE: 0.9 K/UL (ref 1–5.1)
LYMPHOCYTES RELATIVE PERCENT: 9.6 %
MCH RBC QN AUTO: 25.9 PG (ref 26–34)
MCHC RBC AUTO-ENTMCNC: 31.9 G/DL (ref 31–36)
MCV RBC AUTO: 81.2 FL (ref 80–100)
MONOCYTES ABSOLUTE: 0.6 K/UL (ref 0–1.3)
MONOCYTES RELATIVE PERCENT: 6.7 %
NEUTROPHILS ABSOLUTE: 7.5 K/UL (ref 1.7–7.7)
NEUTROPHILS RELATIVE PERCENT: 81 %
PDW BLD-RTO: 18 % (ref 12.4–15.4)
PERFORMED ON: ABNORMAL
PLATELET # BLD: 340 K/UL (ref 135–450)
PMV BLD AUTO: 8 FL (ref 5–10.5)
POTASSIUM SERPL-SCNC: 4.3 MMOL/L (ref 3.5–5.1)
RBC # BLD: 3.01 M/UL (ref 4–5.2)
SODIUM BLD-SCNC: 137 MMOL/L (ref 136–145)
WBC # BLD: 9.3 K/UL (ref 4–11)

## 2021-04-09 PROCEDURE — 99232 SBSQ HOSP IP/OBS MODERATE 35: CPT | Performed by: INTERNAL MEDICINE

## 2021-04-09 PROCEDURE — 97167 OT EVAL HIGH COMPLEX 60 MIN: CPT

## 2021-04-09 PROCEDURE — 2580000003 HC RX 258: Performed by: STUDENT IN AN ORGANIZED HEALTH CARE EDUCATION/TRAINING PROGRAM

## 2021-04-09 PROCEDURE — 80048 BASIC METABOLIC PNL TOTAL CA: CPT

## 2021-04-09 PROCEDURE — 6370000000 HC RX 637 (ALT 250 FOR IP): Performed by: PODIATRIST

## 2021-04-09 PROCEDURE — 1200000000 HC SEMI PRIVATE

## 2021-04-09 PROCEDURE — 2580000003 HC RX 258: Performed by: PODIATRIST

## 2021-04-09 PROCEDURE — 36415 COLL VENOUS BLD VENIPUNCTURE: CPT

## 2021-04-09 PROCEDURE — 6360000002 HC RX W HCPCS: Performed by: STUDENT IN AN ORGANIZED HEALTH CARE EDUCATION/TRAINING PROGRAM

## 2021-04-09 PROCEDURE — 6370000000 HC RX 637 (ALT 250 FOR IP): Performed by: STUDENT IN AN ORGANIZED HEALTH CARE EDUCATION/TRAINING PROGRAM

## 2021-04-09 PROCEDURE — 85025 COMPLETE CBC W/AUTO DIFF WBC: CPT

## 2021-04-09 PROCEDURE — 97535 SELF CARE MNGMENT TRAINING: CPT

## 2021-04-09 PROCEDURE — 97530 THERAPEUTIC ACTIVITIES: CPT

## 2021-04-09 RX ORDER — CETIRIZINE HYDROCHLORIDE 10 MG/1
10 TABLET ORAL DAILY
Status: DISCONTINUED | OUTPATIENT
Start: 2021-04-09 | End: 2021-04-14 | Stop reason: HOSPADM

## 2021-04-09 RX ADMIN — INSULIN LISPRO 4 UNITS: 100 INJECTION, SOLUTION INTRAVENOUS; SUBCUTANEOUS at 18:35

## 2021-04-09 RX ADMIN — INSULIN LISPRO 10 UNITS: 100 INJECTION, SOLUTION INTRAVENOUS; SUBCUTANEOUS at 18:33

## 2021-04-09 RX ADMIN — ATORVASTATIN CALCIUM 20 MG: 20 TABLET, FILM COATED ORAL at 20:45

## 2021-04-09 RX ADMIN — PIPERACILLIN AND TAZOBACTAM 3375 MG: 3; .375 INJECTION, POWDER, LYOPHILIZED, FOR SOLUTION INTRAVENOUS at 16:00

## 2021-04-09 RX ADMIN — INSULIN HUMAN 30 UNITS: 100 INJECTION, SUSPENSION SUBCUTANEOUS at 08:53

## 2021-04-09 RX ADMIN — INSULIN LISPRO 2 UNITS: 100 INJECTION, SOLUTION INTRAVENOUS; SUBCUTANEOUS at 13:06

## 2021-04-09 RX ADMIN — CETIRIZINE HYDROCHLORIDE 10 MG: 10 TABLET, FILM COATED ORAL at 12:56

## 2021-04-09 RX ADMIN — INSULIN LISPRO 10 UNITS: 100 INJECTION, SOLUTION INTRAVENOUS; SUBCUTANEOUS at 08:50

## 2021-04-09 RX ADMIN — OXYCODONE HYDROCHLORIDE 10 MG: 5 TABLET ORAL at 06:16

## 2021-04-09 RX ADMIN — LABETALOL HYDROCHLORIDE 100 MG: 100 TABLET, FILM COATED ORAL at 08:44

## 2021-04-09 RX ADMIN — Medication 10 ML: at 20:38

## 2021-04-09 RX ADMIN — PIPERACILLIN AND TAZOBACTAM 3375 MG: 3; .375 INJECTION, POWDER, LYOPHILIZED, FOR SOLUTION INTRAVENOUS at 08:44

## 2021-04-09 RX ADMIN — INSULIN LISPRO 10 UNITS: 100 INJECTION, SOLUTION INTRAVENOUS; SUBCUTANEOUS at 13:02

## 2021-04-09 RX ADMIN — Medication 10 ML: at 23:34

## 2021-04-09 RX ADMIN — LABETALOL HYDROCHLORIDE 100 MG: 100 TABLET, FILM COATED ORAL at 20:38

## 2021-04-09 RX ADMIN — PIPERACILLIN AND TAZOBACTAM 3375 MG: 3; .375 INJECTION, POWDER, LYOPHILIZED, FOR SOLUTION INTRAVENOUS at 23:34

## 2021-04-09 RX ADMIN — INSULIN LISPRO 2 UNITS: 100 INJECTION, SOLUTION INTRAVENOUS; SUBCUTANEOUS at 20:40

## 2021-04-09 RX ADMIN — INSULIN LISPRO 4 UNITS: 100 INJECTION, SOLUTION INTRAVENOUS; SUBCUTANEOUS at 08:50

## 2021-04-09 RX ADMIN — ENOXAPARIN SODIUM 40 MG: 40 INJECTION SUBCUTANEOUS at 08:44

## 2021-04-09 RX ADMIN — OXYCODONE HYDROCHLORIDE 10 MG: 5 TABLET ORAL at 20:45

## 2021-04-09 RX ADMIN — Medication 5 ML: at 08:44

## 2021-04-09 RX ADMIN — SODIUM HYPOCHLORITE: 2.5 SOLUTION TOPICAL at 08:49

## 2021-04-09 ASSESSMENT — PAIN DESCRIPTION - ONSET: ONSET: ON-GOING

## 2021-04-09 ASSESSMENT — PAIN DESCRIPTION - DESCRIPTORS
DESCRIPTORS: SHARP
DESCRIPTORS: SHARP

## 2021-04-09 ASSESSMENT — PAIN DESCRIPTION - LOCATION: LOCATION: FOOT

## 2021-04-09 ASSESSMENT — PAIN DESCRIPTION - FREQUENCY: FREQUENCY: INTERMITTENT

## 2021-04-09 ASSESSMENT — PAIN - FUNCTIONAL ASSESSMENT
PAIN_FUNCTIONAL_ASSESSMENT: ACTIVITIES ARE NOT PREVENTED
PAIN_FUNCTIONAL_ASSESSMENT: PREVENTS OR INTERFERES SOME ACTIVE ACTIVITIES AND ADLS

## 2021-04-09 ASSESSMENT — PAIN DESCRIPTION - PAIN TYPE
TYPE: ACUTE PAIN;SURGICAL PAIN
TYPE: ACUTE PAIN;SURGICAL PAIN

## 2021-04-09 ASSESSMENT — PAIN DESCRIPTION - PROGRESSION: CLINICAL_PROGRESSION: NOT CHANGED

## 2021-04-09 NOTE — PROGRESS NOTES
Patient is A&O x4.  RA, sat 89%. Patient placed back on one liter, sat 93%. No complaints of SOB. Medicated for c/o left foot pain as needed. Respirations appear to easy and unlabored. Lungs clear. Respirations easy with no complaints of cough. No complaints of nausea/vomiting/diarrhea. Up with assist/walker to the bathroom/BSC as needed. Pure wick in place. IVPB infusing per left hand PIV as ordered. Tolerating diabetic diet. Ace wraps to BLE. Plan of care and safety measures reviewed with the patient. Call light in reach and bed alarm in place. Will continue to monitor.   Electronically signed by Colby Garay RN on 4/9/2021 at 12:16 AM

## 2021-04-09 NOTE — PROGRESS NOTES
Podiatric Surgery Daily Progress Note  Karen Rubin      Subjective :   Patient seen and examined this am at the bedside. Patient denies any acute overnight events. Patient denies N/V/F/C/SOB. Patient denies calf pain, thigh pain, chest pain. Review of Systems: A 12 point review of symptoms is unremarkable with the exception of the chief complaint. Patient specifically denies nausea, fever, vomiting, chills, shortness of breath, chest pain, abdominal pain, constipation or difficulty urinating. Objective     /64   Pulse 68   Temp 98 °F (36.7 °C) (Oral)   Resp 18   Ht 5' 4\" (1.626 m)   Wt 256 lb (116.1 kg)   LMP  (LMP Unknown)   SpO2 97%   BMI 43.94 kg/m²      I/O:    Intake/Output Summary (Last 24 hours) at 4/9/2021 0709  Last data filed at 4/9/2021 0429  Gross per 24 hour   Intake 2590 ml   Output 200 ml   Net 2390 ml              Wt Readings from Last 3 Encounters:   04/07/21 256 lb (116.1 kg)   12/29/20 257 lb (116.6 kg)   12/22/20 157 lb (71.2 kg)       LABS:    Recent Labs     04/07/21  0056 04/07/21  0056 04/08/21  0616 04/08/21  0755 04/08/21  1919   WBC 11.3*  --  11.5*  --   --    HGB 8.6*   < > 6.6* 6.5* 8.1*   HCT 27.0*   < > 21.3* 21.0* 24.9*     --  326  --   --     < > = values in this interval not displayed. Recent Labs     04/08/21  0616      K 5.1      CO2 28   BUN 16   CREATININE 1.2        Recent Labs     04/07/21  0312   INR 1.08           LOWER EXTREMITY EXAMINATION    Dressing to bilateral LE intact. No strikethrough noted to the external dressing. Moderate sanguineous drainage noted to the internal layers of the dressing LLE. VASCULAR: DP and PT pulses nonpalpable bilateral.   Upon Doppler examination, DP signals monophasic and PT signals biphasic bilateral. CFT is sluggish to the digits of the foot b/l.  Skin temperature is warm to warm left lower extremity from proximal to distal with  focal calor noted globally to the left foot, improved from yesterday. Skin temperature warm to cool right lower extremity. +1 pitting edema noted bilateral lower extremity. No pain with calf compression b/l.     NEUROLOGIC: Gross and epicritic sensation is diminished b/l. Protective sensation is diminished at all pedal sites b/l.     DERMATOLOGIC: Diffuse dermatologic changes noted bilateral lower extremity. Right lower extremity is a closed soft tissue envelope. Left lower extremity:  Open surgical incision noted to previous full thickness ulcerations lateral aspect of foot extending distally from cuboid to distal aspect of foot with bone exposed. Skin edges with mild dehiscence at the plantar proximal aspect. Less than 1 cc caseous drainage noted from proximal lateral aspect of wound. Malodor decreased from yesterday. No fluctuance, or crepitus noted. Erythema improved, violaceous rubor remains to leg. Patient provided verbal consent for today's photos. MUSCULOSKELETAL: Muscle strength is 5/5 for all pedal groups tested. Diffuse tenderness with palpation of the periwound and globally left foot. Ankle joint ROM is decreased in dorsiflexion with the knee extended. History of previous partial fifth ray amputation right foot. IMAGING:  Xray left foot 4/7/2021 - post-op  Narrative   LEFT FOOT:       HISTORY: Postop study.       TECHNIQUE: 3 views obtained.       COMPARISON: 4/17/2021.       FINDINGS: There has been surgical resection of the fifth metatarsal. Lucency in the base of the proximal phalanx of the fifth toe is noted, with lateral and proximal cortical loss.       There is localized soft tissue swelling with scattered gas consistent with postoperative changes in the lateral aspect of the foot.       Other bony structures appear intact.           Impression       Status post resection of the fifth metatarsal.       Abnormal lucency in the base of the proximal phalanx of the fifth toe consistent with osteomyelitis.  Correlate clinically.                   Xray left foot 4/7/2021 - pre-op  Narrative       Patient: Merced Renteria  Time Out: 01:11   Exam(s): FILM LEFT FOOT        EXAM:     XR Left Foot Complete, 3 or More Views       CLINICAL HISTORY:     ulcer, infection.       TECHNIQUE:     Frontal, lateral and oblique views of the left foot.       COMPARISON:     No relevant prior studies available.       FINDINGS:     Bones/joints:  Lucencies in 5th metatarsal and 5th proximal phalanx,    concerning for osteomyelitis.  Cannot exclude superimposed fracture.  No    dislocation.     Soft tissues:  Soft tissue swelling.  Lateral foot soft tissue air.  No    radiopaque foreign body.           Electronically signed by Antionette Dunbar M.D. on 04-07-21 at 0111       Impression   1.  Lucencies in 5th metatarsal and 5th proximal phalanx, concerning for    osteomyelitis.  Cannot exclude superimposed fracture. 2.  Soft tissue swelling.  Lateral foot soft tissue air.             Arterial duplex 4/8/2021 -preliminary  Tech Comments   Right   The right ankle/brachial index is 0.96 (PT-144 mmHg; DP-136 mmHg). There is normal, multiphasic flow identified in the common femoral artery,   suggesting no evidence of significant aorto-iliac inflow disease. There is atherosclerotic plaque involving the femoro-popliteal level with no   significantly elevated velocities, consistent with <50% stenosis. Limited visualization in the calf due to thickened skin that could not be   penetrated with ultrasound from the mid to distal calf. Left   The left ankle/brachial index is 0.96 (PT-144 mmHg; DP-130 mmHg). There is normal, multiphasic flow identified in the common femoral artery,   suggesting no evidence of significant aorto-iliac inflow disease. There is atherosclerotic plaque involving the femoro-popliteal level with no   significantly elevated velocities, consistent with <50% stenosis.  Monophasic   flow is demonstrated throughout the left lower extremity likely due to   hyperemic state. Limited visualization in the distal calf and ankle area due to   wounds/dressings. There are no previous studies for comparison. ASSESSMENT/PLAN  -S/P Left foot I&D with 5th metatarsal resection packed open 2/2 Diabetic foot ulceration, OM and gas gangrene, left foot, Earl 3  -Cellulitis, left lower extremity  -Peripheral arterial disease, bilateral lower extremity  -Peripheral vascular disease, bilateral lower extremity  -Diabetes mellitus type 2 with peripheral neuropathy    -Patient seen and evaluated at the bedside this AM.  -VSS. No updated labs this AM.  -Hemoglobin 8.1 after receiving 2 units yesterday. Ordered CBC CMP this a.m.  -.8. ESR greater than 120.  -Preliminary cultures: Group B strep, Providencia stuartii, Morganella morganii  -Surgical pathology pending; will follow up results  -X-rays reviewed and impressions noted above. -Arterial duplex reviewed and impression noted above. -Order left foot and ankle MRI; will follow results  -Dressing applied left lower extremity consisting of instill wound VAC, Adaptic, gauze, Kerlix, Ace bandage. Instill wound VAC set to 125 mmHg with half-strength Dakin's and 1 L saline set to 10 mL for 10 minutes every 2 hours. -Dressing applied to right lower extremity consisting of Adaptic, gauze, Kerlix, Ace bandage.  -Continue antibiotics per ID recs: Zosyn.  -Social work consulted for discharge planning and likely SNF placement. -PT/OT ordered; will follow-up results. -Nonweightbearing left lower extremity. DISPO:S/P Left foot I&D with 5th metatarsal resection packed open. This is a staged procedure, with further debridement and surgical intervention required while in house. Ordered MRI; will follow up results for surgical planning. Instill wound VAC applied today. Will continue to follow patient closely.      Patient seen and evaluated at the bedside with Dr. Bonny Patton DPM.    Jaja Lam

## 2021-04-09 NOTE — PROGRESS NOTES
insulin lispro  10 Units Subcutaneous TID     sodium chloride flush  5-40 mL Intravenous 2 times per day    piperacillin-tazobactam  3,375 mg Intravenous Q8H    insulin lispro  0-12 Units Subcutaneous TID WC    insulin lispro  0-6 Units Subcutaneous Nightly    enoxaparin  40 mg Subcutaneous Daily    labetalol  100 mg Oral BID    atorvastatin  20 mg Oral Nightly       Continuous Infusions:   sodium chloride      dextrose      sodium chloride      lactated ringers Stopped (04/07/21 1553)       PRN Meds:  sodium chloride, glucose, dextrose, glucagon (rDNA), dextrose, sodium chloride flush, sodium chloride, promethazine **OR** ondansetron, polyethylene glycol, acetaminophen **OR** acetaminophen, oxyCODONE **OR** oxyCODONE      Assessment:     Hx DM, neuropathy, obesity (BMI 43), HTN, HL  Prior DM foot amputation      L foot infection - gas gangrene / limb threatening with extension osteomyelitis     POD#2 debridement    - reviewed OR note: \"At this time, attention was directed towards the left  foot where a bilobed fetid malodorous ulceration was noted to be present  on the plantar lateral aspect of the fifth metatarsal.  At this time,  this ulcer was circumscribed and ellipsed out. There was copious amount  of necrotic, fetid, malodorous tissue encountered. The base of the  fifth metatarsal was frankly necrotic and the insertion of the peroneus  brevis tendon was also fetid and malodorous.  \"    Culture polymicrobial    Plan:     Cont zosyn  Will f/u on cult - await sensitivities  Wound care and further surg per Podiatry - OR Mon 4/12  Await OR note     Antibiotic plan will depend if resection felt to be definitive  Discussed with pt, Podiatry Resident   Laura Tracy MD

## 2021-04-09 NOTE — PROGRESS NOTES
Hospitalist Progress Note      PCP: Mikael Morocho MD    Date of Admission: 4/6/2021    Chief Complaint: Foot Pain     Hospital Course: 75yo female PMH IDDM complicated by neuropathy and chronic foot wounds and comorbid obesity, HTN, HLD, and chronic venous stasis. Admitted for diabetic foot ulcer with infection and underlying osteomyelitis. Was taken to OR on day 1 of admission with Podiatry for debridement and resection of nonviable bone. Pt tolerated the procedure well. However the morning after the procedure had significant anemia with Hb 6.6 from 8.6. Was ordered 2U PRBCs to be transfused. Subjective: Doing well today POD2 and now with wound vac placed. Anemia improved. Pt complains of mucous and allergic congestion. Denies any fevers, chills, chest pain, palpitations, leg swelling, cough, shortness of breath, difficulty breathing, wheezing, abdominal pain, nausea, vomiting, diarrhea.     Medications:  Reviewed    Infusion Medications    sodium chloride      dextrose      sodium chloride      lactated ringers Stopped (04/07/21 1553)     Scheduled Medications    insulin NPH  40 Units Subcutaneous BID AC    cetirizine  10 mg Oral Daily    insulin lispro  10 Units Subcutaneous TID WC    sodium chloride flush  5-40 mL Intravenous 2 times per day    piperacillin-tazobactam  3,375 mg Intravenous Q8H    insulin lispro  0-12 Units Subcutaneous TID WC    insulin lispro  0-6 Units Subcutaneous Nightly    enoxaparin  40 mg Subcutaneous Daily    labetalol  100 mg Oral BID    atorvastatin  20 mg Oral Nightly     PRN Meds: sodium chloride, glucose, dextrose, glucagon (rDNA), dextrose, sodium chloride flush, sodium chloride, promethazine **OR** ondansetron, polyethylene glycol, acetaminophen **OR** acetaminophen, oxyCODONE **OR** oxyCODONE      Intake/Output Summary (Last 24 hours) at 4/9/2021 1321  Last data filed at 4/9/2021 0429  Gross per 24 hour   Intake 2080 ml   Output 200 ml   Net 1880 ml       Physical Exam Performed:    /64   Pulse 71   Temp 98 °F (36.7 °C) (Oral)   Resp 18   Ht 5' 4\" (1.626 m)   Wt 256 lb (116.1 kg)   LMP  (LMP Unknown)   SpO2 94%   BMI 43.94 kg/m²     General appearance: No apparent distress, appears stated age and cooperative. HEENT: Pupils equal, round, and reactive to light. Conjunctivae/corneas clear. Neck: Supple, with full range of motion. No jugular venous distention. Trachea midline. Respiratory:  Normal respiratory effort. Clear to auscultation, bilaterally without Rales/Wheezes/Rhonchi. Cardiovascular: Regular rate and rhythm with normal S1/S2 without murmurs, rubs or gallops. Abdomen: Soft, non-tender, non-distended with normal bowel sounds. Musculoskeletal: No clubbing, cyanosis or edema bilaterally. Full range of motion without deformity. Skin: Skin color, texture, turgor normal.  No rashes or lesions. Neurologic:  Neurovascularly intact without any focal sensory/motor deficits. Cranial nerves: II-XII intact, grossly non-focal.  Psychiatric: Alert and oriented, thought content appropriate, normal insight  Capillary Refill: Brisk,< 3 seconds   Peripheral Pulses: +2 palpable, equal bilaterally       Labs:   Recent Labs     04/07/21  0056 04/07/21  0056 04/08/21  0616 04/08/21  0755 04/08/21  1919 04/09/21  1137   WBC 11.3*  --  11.5*  --   --  9.3   HGB 8.6*   < > 6.6* 6.5* 8.1* 7.8*   HCT 27.0*   < > 21.3* 21.0* 24.9* 24.4*     --  326  --   --  340    < > = values in this interval not displayed. Recent Labs     04/07/21  0056 04/07/21  0312 04/08/21  0616 04/09/21  1137   *  --  136 137   K 5.6* 4.6 5.1 4.3   CL 98*  --  101 101   CO2 27  --  28 29   BUN 19  --  16 16   CREATININE 1.0  --  1.2 1.2   CALCIUM 8.7  --  8.2* 8.3     No results for input(s): AST, ALT, BILIDIR, BILITOT, ALKPHOS in the last 72 hours.   Recent Labs     04/07/21  0312   INR 1.08     No results for input(s): Mickey Cordova in the last 72 hours.    Urinalysis:      Lab Results   Component Value Date    NITRU Negative 11/30/2019    WBCUA 10-20 11/30/2019    BACTERIA 2+ 11/30/2019    RBCUA 3-5 11/30/2019    BLOODU TRACE-INTACT 11/30/2019    SPECGRAV 1.020 11/30/2019    GLUCOSEU 100 11/30/2019       Radiology:  VL DUP LOWER EXTREMITY ARTERIES BILATERAL   Final Result      XR FOOT LEFT (MIN 3 VIEWS)   Final Result      Status post resection of the fifth metatarsal.      Abnormal lucency in the base of the proximal phalanx of the fifth toe consistent with osteomyelitis. Correlate clinically. XR CHEST PORTABLE   Final Result     No acute findings in the chest.          XR FOOT LEFT (MIN 3 VIEWS)   Final Result   1. Lucencies in 5th metatarsal and 5th proximal phalanx, concerning for    osteomyelitis. Cannot exclude superimposed fracture. 2.  Soft tissue swelling. Lateral foot soft tissue air. MRI FOOT LEFT WO CONTRAST    (Results Pending)   MRI ANKLE LEFT WO CONTRAST    (Results Pending)           Assessment/Plan:    Active Hospital Problems    Diagnosis Date Noted    Diabetic foot infection ([address] [N76.427, L08.9] 04/08/2021    Group B streptococcal infection [A49.1] 04/08/2021    Chronic osteomyelitis of left foot with draining sinus [facility]) [M86.472] 04/08/2021    Osteomyelitis ([address] [M86.9] 04/07/2021     Infected Diabetic Foot Ulcer with suspected Osteomyelitis  - POD1 for amputation of left 5th ray with Podiatry, plan for wound vac tmro, and further staged procedures this admission  - Zosyn pending further adjustment per ID. Will need long term abx and potentially PICC line.    - dressing and wound care per Podiatry  - trending CRP  - Roxicodone 5,10mg pain panel     Insulin Dependant Diabetes Mellitus  - Insulin NPH 40U BID  - Lispro 10U TID AC  - Medium Dose SSI AC HS  - POC Glucose AC HS   - hypoglycemia protocol      Hx of 8mm lung base nodule  - outpatient fu for further imaging    Morbid Obesity: Body mass index is 43.94 kg/m². Delmon Green Complicating assessment and treatment. Placing patient at risk for multiple co-morbidities as well as early death and contributing to the patient's presentation.  Counseled on diet, weight loss    Allergic Congestion  - Cetirizine 10mg daily    Other Chronic Issues  HTN- labetalol 100mg BID  HLD - lipitor 20mg daily       DVT Prophylaxis: Lovenox  Diet: Dietary Nutrition Supplements: Wound Healing Oral Supplement, Diabetic Oral Supplement  DIET CARB CONTROL; Carb Control: 4 carb choices (60 gms)/meal  Code Status: Full Code    PT/OT Eval Status: pending    Dispo - inpatient    This patient will be staffed and discussed with MD Jolynn Alvarez, DO PGY2

## 2021-04-09 NOTE — PROGRESS NOTES
Occupational Therapy   Occupational Therapy Initial Assessment and Treatment    Date: 2021   Patient Name: Marie Nolasco  MRN: [id number]     : 1945    Date of Service: 2021    Discharge Recommendations:  Marie Nolasco scored a 13/24 on the AM-PAC ADL Inpatient form. Current research shows that an AM-PAC score of 17 or less is typically not associated with a discharge to the patient's home setting. Based on the patient's AM-PAC score and their current ADL deficits, it is recommended that the patient have 3-5 sessions per week of Occupational Therapy at d/c to increase the patient's independence. Please see assessment section for further patient specific details. If patient discharges prior to next session this note will serve as a discharge summary. Please see below for the latest assessment towards goals. OT Equipment Recommendations  Equipment Needed: No(defer recommendations to discharge faciltiy)    Assessment   Performance deficits / Impairments: Decreased functional mobility ; Decreased ADL status; Decreased endurance;Decreased ROM; Decreased strength  Assessment: Pt admitted from home alone. At baseline, reports independent w/ ADLs and mobility using cane. Reports her home is in disarray (with busted water pipes, collapsed ceiling in LR, and bedbugs). Pt does not have running water - hygiene is poor. Currently, pt is NWB LLE (with additional surgeries planned this admission). Pt is having difficulty maintaining NWB status. Pt is functioning well-below reported independent baseline. Would benefit from continued IP OT up on discharge to maximize safety and independence prior to return home alone. Pt in agreement with this plan. Continue per POC.   Treatment Diagnosis: impaired ADLs/transfers 2* NWB status LLE  Decision Making: High Complexity  OT Education: OT Role;Plan of Care;ADL Adaptive Strategies;Transfer Training  Patient Education: educated about NWB status - states understanding, but decreased abiltiy to maintain (would benefit from continued education and reinforcement)  REQUIRES OT FOLLOW UP: Yes  Activity Tolerance  Activity Tolerance: Patient Tolerated treatment well  Safety Devices  Safety Devices in place: Yes  Type of devices: Nurse notified; Left in chair;Chair alarm in place;Call light within reach(LEs elevated)           Patient Diagnosis(es): The primary encounter diagnosis was Other acute osteomyelitis of left foot ([facility] A diagnosis of Sepsis due to cellulitis [facility]) was also pertinent to this visit. has a past medical history of Acute respiratory failure with hypoxia ([facility] [address] Hx of blood clots, Hyperlipidemia, Hypertension, Mallet finger, PONV (postoperative nausea and vomiting), and Type II or unspecified type diabetes mellitus without mention of complication, not stated as uncontrolled. has a past surgical history that includes Breast biopsy (Right); Toe amputation (Right, 10/30/2020); and Foot Debridement (Left, 4/7/2021). Treatment Diagnosis: impaired ADLs/transfers 2* NWB status LLE      Restrictions  Position Activity Restriction  Other position/activity restrictions: up with assistance, NWB left LE    Subjective   General  Chart Reviewed: Yes  Patient assessed for rehabilitation services?: Yes  Additional Pertinent Hx: 76 y.o. F to who presents to ED with left foot pain for the last couple of days. Hospital Course: OR 4/7 for LEFT LOWER EXTREMITY FOOT DEBRIDEMENT INCISION AND DRAINAGE WITH  5TH METATARSAL RESECTION (Left ). PMH: uncontrolled type 2 diabetes complicated by neuropathy comorbid obesity, hypertension, hyperlipidemia, chronic venous stasis. Family / Caregiver Present: No  Referring Practitioner: Brenda Portillo DPM  Diagnosis: OM    Subjective  Subjective: In bed on entry. \"You have earned your roma wings today. \"    Patient Currently in Pain: Denies      Social/Functional History  Social/Functional History  Lives With: Alone  Type of Home: House  Home Layout: Two level, Able to Live on Main level with bedroom/bathroom, 1/2 bath on main level(pt hasn't gone upstairs in ~2 yrs)  Home Access: Stairs to enter with rails  Entrance Stairs - Number of Steps: 4 + 6  Entrance Stairs - Rails: (both sides on first 4, right side on next 6)  Bathroom Shower/Tub: Tub/Shower unit  Bathroom Toilet: Standard(not working for past several weeks d/t plumbing issues. pt using BSC.)  Bathroom Equipment: Commode  Home Equipment: Cane  ADL Assistance: (was using water bottles to spongebathe; no water supply)  Homemaking Assistance: Independent  Ambulation Assistance: Independent(cane most of the time)  Transfer Assistance: Independent  Active : Yes  Additional Comments: pt has no available assistance. has brother in [place] & sister who will not help her. pt's home currently without working plumbing, \"pipes froze in [place]"; caved in . [address] from water leaking to 1st floor. job & family services to assist with repairs?  pt has been sleeping at kitchen table d/t not being able to go upstairs. no falls. pt gets own grocerires. ; couches infested w/ bed bugs       Objective   Vision: Impaired  Vision Exceptions: Wears glasses at all times  Hearing: Within functional limits      Orientation  Overall Orientation Status: Within Functional Limits        Balance  Sitting Balance: Supervision  Standing Balance: Minimal assistance(max VCs for NWB status (puts weight through her heel despite cues))    Toilet Transfers  Toilet - Technique: Stand step(simulated using bedside chair)  Equipment Used: Extra wide bedside commode  Toilet Transfer: Moderate assistance    ADL  Feeding: Setup  Grooming: Moderate assistance(assistance provided to comb hair (limited R shoulder ROM - required assist to comb/untangle hair and apply scrunchie))  UE Bathing:  Moderate assistance(limited RUE ROM (baseline); assist provided to wash back and L arm/pit; \"I haven't been able to wash that arm for awhile\")  LE Bathing: Dependent/Total(assist for lower legs/feet and for posterior hygiene)  UE Dressing: Minimal assistance(don/doff gown 2* lines)  LE Dressing: Dependent/Total(socks)  Toileting: Dependent/Total(Purewick catheter; requires assist for posterior hygiene)  Additional Comments: Note: poor hygiene (reports she hasn't washed her hair in quite awhile; using bottled water to perform limited spongebath at home - no running water)     Coordination  Movements Are Fluid And Coordinated: Yes        Bed mobility  Supine to Sit: Minimal assistance(HOB elevated, bedrail, cues, ++ time/effort)  Scooting: Stand by assistance(to EOB)     Transfers  Stand Step Transfers: Moderate assistance(max cues for walker sequence and negotiation; unable to maintain NWB status despite education)  Sit to stand: Moderate assistance  Stand to sit: Moderate assistance        Cognition  Overall Cognitive Status: WFL                    LUE AROM (degrees)  LUE AROM : WFL  RUE AROM (degrees)  RUE General AROM: shoulder flex ~40 (reports rotator cuff tear)              Pt seen by OT for eval and treat.  Treatment included: bed mobility, unsupported sitting, transfer, ADL, pt education            Plan   Plan  Times per week: 2-5  Times per day: Daily  Current Treatment Recommendations: Strengthening, Balance Training, Functional Mobility Training, Endurance Training, Safety Education & Training, Self-Care / ADL                                                 AM-PAC Score        AM-[facility] Inpatient Daily Activity Raw Score: 13 (04/09/21 1240)  AM-PAC Inpatient ADL T-Scale Score : 32.03 (04/09/21 1240)  ADL Inpatient CMS 0-100% Score: 63.03 (04/09/21 1240)  ADL Inpatient CMS G-Code Modifier : CL (04/09/21 1240)    Goals  Short term goals  Time Frame for Short term goals: Discharge  Short term goal 1: simulated BSC transfer w/ Mod A, NWB LLE  Short term goal 2: chair pushups x 5, NWB LLE to increase functional strength for transfers  Short term goal 3: increase standing tolerance to 2 minutes, NWB LLE  Patient Goals   Patient goals : to be independnet       Therapy Time   Individual Concurrent Group Co-treatment   Time In 1132         Time Out 1225         Minutes 53           Timed Code Treatment Minutes:   38    Total Treatment Minutes:  [facility] OTR/L #[id number]

## 2021-04-09 NOTE — OP NOTE
[address], [address]                                OPERATIVE REPORT    PATIENT NAME: Janay Craft                       :        1945  MED REC NO:   [id number]                          ROOM:       7040  ACCOUNT NO:   [de-identified]                           ADMIT DATE: 2021  PROVIDER:     Ellie Woods DPM    DATE OF PROCEDURE:  2021    SURGEON:  Ellie Woods DPM    ASSISTANTS:  Ayse Sue DPM, PGY-3; and Eriberto Jaimes DPM, PGY-1.    PREOPERATIVE DIAGNOSES:  1. Diabetic foot infection with gas gangrene, left foot. 2.  Osteomyelitis of the fifth metatarsal, left foot. 3.  Diabetes mellitus with peripheral neuropathy. POSTOPERATIVE DIAGNOSES:  1. Diabetic foot infection with gas gangrene, left foot. 2.  Osteomyelitis of the fifth metatarsal, left foot. 3.  Diabetes mellitus with peripheral neuropathy. PROCEDURES:  Incision and drainage down to bone cortex with amputation  of the fifth metatarsal.    PATHOLOGY:  A culture swab was sent to Pathology for culture and  sensitivity. A portion of the fifth metatarsal was sent to Microbiology  for culture and sensitivity and the remainder of the fifth metatarsal  was sent to Pathology for gross microscopic examination. HEMOSTASIS:  Anatomic dissection and electrocauterization. ESTIMATED BLOOD LOSS:  200 mL. INJECTION:  Includes 20 mL of 2% lidocaine plain followed by 30 mL of  0.5% Marcaine plain postoperatively. INDICATIONS FOR SURGERY:  The patient presented to the emergency  department with a diabetic foot infection. Plain film radiographs  demonstrated gas in the subcutaneous tissues. The patient was then  brought to the operating room urgently for incision and drainage  procedure. All risks versus benefits of the planned procedure, all  questions were answered, no guarantees were given.   Prolonged discussion  with the patient that she had a limb and life-threatening infection that  needed to be urgently addressed. This was also a planned staged  procedure as the initial incision and drainage was purely to get rid of  any necrotic tissue and we will see how her tissues respond after that. DESCRIPTION OF PROCEDURE:  The patient was brought from the preoperative  area and placed on the operating table in the supine position. Following induction of general anesthesia, local block consisting a  total of 20 mL of 2% lidocaine plain was administered to all surgical  incision sites. The patient's left lower extremity was then scrubbed,  prepped, and draped in the usual sterile manner. Timeout was performed. The patient, procedure, and operative site were confirmed and the  following procedure was then performed. INCISION AND DRAINAGE DOWN TO BONE CORTEX, RIGHT FOOT, WITH RESECTION OF  FIFTH METATARSAL:  At this time, attention was directed towards the left  foot where a bilobed fetid malodorous ulceration was noted to be present  on the plantar lateral aspect of the fifth metatarsal.  At this time,  this ulcer was circumscribed and ellipsed out. There was copious amount  of necrotic, fetid, malodorous tissue encountered. The base of the  fifth metatarsal was frankly necrotic and the insertion of the peroneus  brevis tendon was also fetid and malodorous. The base of the fifth  metatarsal was markedly crumbly, necrotic portion. This bone was sent  to Microbiology for culture and sensitivity. At this time, the fifth  metatarsal was dissected out and extirpated in toto from the surgical  site. The digit was left to preserve the skin flap as it may be used as  a _____ flap later for wound closure. At this time, there was a  significant amount of purulent drainage coming dorsally over the fourth  metatarsal where the abscess cavity was noted. This was drained and the  loculation was broken up with a hemostat.   Proximally along the peroneus brevis tendon, there was significant more necrotic tissue. This also  was excisionally debrided with a rongeur and extirpated in toto from the  surgical site. Once all frankly necrotic tissue had been debrided, the wound was then  irrigated with 3 liters of normal sterile saline via pulse lavage. Hemostasis was then achieved with electrocauterization and a 3-0 Vicryl  tie. Gelfoam was utilized to stop any oozing that was also encountered. The wound was then dressed with sterile gauze, 4 x 4's, 4 x 8's, sterile  Kerlix, and Claudette. A gentle compression dressing was applied to the  patient's left lower extremity. The patient tolerated the procedure and anesthesia well. The patient  left the OR with vital signs stable and vascular status intact to all  digits of the left foot. Following a period of postop monitoring, the  patient will be sent back to her in-house room where she will continue  to receive IV antibiotic therapy. As the urgency is now under control  for the infection, we will proceed with getting an MRI and vascular  study to better assess the level of tissue destruction now that the  necrotizing gas has been drained.         Familia Aguiar DPM    D: 04/08/2021 19:44:26       T: 04/08/2021 22:30:27     [email or web address]  Job#: [id number]     Doc#: [id number]    CC:  Kamilla Conley DPM

## 2021-04-10 ENCOUNTER — APPOINTMENT (OUTPATIENT)
Dept: GENERAL RADIOLOGY | Age: 76
DRG: 616 | End: 2021-04-10
Payer: MEDICARE

## 2021-04-10 ENCOUNTER — APPOINTMENT (OUTPATIENT)
Dept: MRI IMAGING | Age: 76
DRG: 616 | End: 2021-04-10
Payer: MEDICARE

## 2021-04-10 LAB
ANION GAP SERPL CALCULATED.3IONS-SCNC: 10 MMOL/L (ref 3–16)
BASOPHILS ABSOLUTE: 0.1 K/UL (ref 0–0.2)
BASOPHILS RELATIVE PERCENT: 0.7 %
BUN BLDV-MCNC: 16 MG/DL (ref 7–20)
CALCIUM SERPL-MCNC: 8.3 MG/DL (ref 8.3–10.6)
CHLORIDE BLD-SCNC: 102 MMOL/L (ref 99–110)
CO2: 28 MMOL/L (ref 21–32)
CREAT SERPL-MCNC: 1.1 MG/DL (ref 0.6–1.2)
EOSINOPHILS ABSOLUTE: 0.3 K/UL (ref 0–0.6)
EOSINOPHILS RELATIVE PERCENT: 3.4 %
GFR AFRICAN AMERICAN: 58
GFR NON-AFRICAN AMERICAN: 48
GLUCOSE BLD-MCNC: 105 MG/DL (ref 70–99)
GLUCOSE BLD-MCNC: 158 MG/DL (ref 70–99)
GLUCOSE BLD-MCNC: 189 MG/DL (ref 70–99)
GLUCOSE BLD-MCNC: 212 MG/DL (ref 70–99)
GLUCOSE BLD-MCNC: 97 MG/DL (ref 70–99)
GRAM STAIN RESULT: ABNORMAL
GRAM STAIN RESULT: ABNORMAL
HCT VFR BLD CALC: 25.6 % (ref 36–48)
HEMOGLOBIN: 8.2 G/DL (ref 12–16)
LYMPHOCYTES ABSOLUTE: 1 K/UL (ref 1–5.1)
LYMPHOCYTES RELATIVE PERCENT: 14 %
MCH RBC QN AUTO: 26 PG (ref 26–34)
MCHC RBC AUTO-ENTMCNC: 32 G/DL (ref 31–36)
MCV RBC AUTO: 81.2 FL (ref 80–100)
MONOCYTES ABSOLUTE: 0.6 K/UL (ref 0–1.3)
MONOCYTES RELATIVE PERCENT: 7.5 %
NEUTROPHILS ABSOLUTE: 5.5 K/UL (ref 1.7–7.7)
NEUTROPHILS RELATIVE PERCENT: 74.4 %
ORGANISM: ABNORMAL
PDW BLD-RTO: 18.6 % (ref 12.4–15.4)
PERFORMED ON: ABNORMAL
PLATELET # BLD: 366 K/UL (ref 135–450)
PMV BLD AUTO: 7.7 FL (ref 5–10.5)
POTASSIUM SERPL-SCNC: 4.1 MMOL/L (ref 3.5–5.1)
RBC # BLD: 3.15 M/UL (ref 4–5.2)
SODIUM BLD-SCNC: 140 MMOL/L (ref 136–145)
WBC # BLD: 7.3 K/UL (ref 4–11)
WOUND/ABSCESS: ABNORMAL

## 2021-04-10 PROCEDURE — 73721 MRI JNT OF LWR EXTRE W/O DYE: CPT

## 2021-04-10 PROCEDURE — 80048 BASIC METABOLIC PNL TOTAL CA: CPT

## 2021-04-10 PROCEDURE — 73718 MRI LOWER EXTREMITY W/O DYE: CPT

## 2021-04-10 PROCEDURE — 36415 COLL VENOUS BLD VENIPUNCTURE: CPT

## 2021-04-10 PROCEDURE — 6370000000 HC RX 637 (ALT 250 FOR IP): Performed by: STUDENT IN AN ORGANIZED HEALTH CARE EDUCATION/TRAINING PROGRAM

## 2021-04-10 PROCEDURE — 6360000002 HC RX W HCPCS: Performed by: STUDENT IN AN ORGANIZED HEALTH CARE EDUCATION/TRAINING PROGRAM

## 2021-04-10 PROCEDURE — 85025 COMPLETE CBC W/AUTO DIFF WBC: CPT

## 2021-04-10 PROCEDURE — 2580000003 HC RX 258: Performed by: STUDENT IN AN ORGANIZED HEALTH CARE EDUCATION/TRAINING PROGRAM

## 2021-04-10 PROCEDURE — 1200000000 HC SEMI PRIVATE

## 2021-04-10 PROCEDURE — 73030 X-RAY EXAM OF SHOULDER: CPT

## 2021-04-10 RX ADMIN — ATORVASTATIN CALCIUM 20 MG: 20 TABLET, FILM COATED ORAL at 22:12

## 2021-04-10 RX ADMIN — CETIRIZINE HYDROCHLORIDE 10 MG: 10 TABLET, FILM COATED ORAL at 08:03

## 2021-04-10 RX ADMIN — INSULIN LISPRO 10 UNITS: 100 INJECTION, SOLUTION INTRAVENOUS; SUBCUTANEOUS at 14:42

## 2021-04-10 RX ADMIN — LABETALOL HYDROCHLORIDE 100 MG: 100 TABLET, FILM COATED ORAL at 22:11

## 2021-04-10 RX ADMIN — PIPERACILLIN AND TAZOBACTAM 3375 MG: 3; .375 INJECTION, POWDER, LYOPHILIZED, FOR SOLUTION INTRAVENOUS at 17:30

## 2021-04-10 RX ADMIN — OXYCODONE HYDROCHLORIDE 5 MG: 5 TABLET ORAL at 16:50

## 2021-04-10 RX ADMIN — INSULIN LISPRO 10 UNITS: 100 INJECTION, SOLUTION INTRAVENOUS; SUBCUTANEOUS at 19:36

## 2021-04-10 RX ADMIN — POLYETHYLENE GLYCOL 3350 17 G: 17 POWDER, FOR SOLUTION ORAL at 16:50

## 2021-04-10 RX ADMIN — PIPERACILLIN AND TAZOBACTAM 3375 MG: 3; .375 INJECTION, POWDER, LYOPHILIZED, FOR SOLUTION INTRAVENOUS at 08:03

## 2021-04-10 RX ADMIN — INSULIN LISPRO 4 UNITS: 100 INJECTION, SOLUTION INTRAVENOUS; SUBCUTANEOUS at 14:46

## 2021-04-10 RX ADMIN — LABETALOL HYDROCHLORIDE 100 MG: 100 TABLET, FILM COATED ORAL at 08:03

## 2021-04-10 RX ADMIN — ACETAMINOPHEN 650 MG: 325 TABLET ORAL at 08:04

## 2021-04-10 RX ADMIN — INSULIN HUMAN 35 UNITS: 100 INJECTION, SUSPENSION SUBCUTANEOUS at 19:36

## 2021-04-10 RX ADMIN — Medication 5 ML: at 08:30

## 2021-04-10 RX ADMIN — ENOXAPARIN SODIUM 40 MG: 40 INJECTION SUBCUTANEOUS at 08:03

## 2021-04-10 ASSESSMENT — PAIN DESCRIPTION - DESCRIPTORS
DESCRIPTORS: ACHING;SORE
DESCRIPTORS: SHARP;SHOOTING

## 2021-04-10 ASSESSMENT — PAIN DESCRIPTION - PAIN TYPE: TYPE: CHRONIC PAIN

## 2021-04-10 ASSESSMENT — PAIN DESCRIPTION - PROGRESSION: CLINICAL_PROGRESSION: GRADUALLY WORSENING

## 2021-04-10 ASSESSMENT — PAIN SCALES - GENERAL
PAINLEVEL_OUTOF10: 0
PAINLEVEL_OUTOF10: 3

## 2021-04-10 ASSESSMENT — PAIN DESCRIPTION - LOCATION: LOCATION: SHOULDER

## 2021-04-10 ASSESSMENT — PAIN DESCRIPTION - ONSET
ONSET: ON-GOING
ONSET: ON-GOING

## 2021-04-10 NOTE — PROGRESS NOTES
Hospitalist Progress Note      PCP: Ramu Osullivan MD    Date of Admission: 4/6/2021    Chief Complaint: Foot Pain     Hospital Course: 77yo female PMH IDDM complicated by neuropathy and chronic foot wounds and comorbid obesity, HTN, HLD, and chronic venous stasis. Admitted for diabetic foot ulcer with infection and underlying osteomyelitis. Was taken to OR on day 1 of admission with Podiatry for debridement and resection of nonviable bone. Pt tolerated the procedure well. However the morning after the procedure had significant anemia with Hb 6.6 from 8.6. Was ordered 2U PRBCs to be transfused.      Subjective:   Doing well, no specific complains, pain is fair control    Medications:  Reviewed    Infusion Medications    sodium chloride      dextrose      sodium chloride      lactated ringers Stopped (04/07/21 5633)     Scheduled Medications    irrigation builder   Irrigation Once    insulin NPH  40 Units Subcutaneous BID AC    cetirizine  10 mg Oral Daily    insulin lispro  10 Units Subcutaneous TID WC    sodium chloride flush  5-40 mL Intravenous 2 times per day    piperacillin-tazobactam  3,375 mg Intravenous Q8H    insulin lispro  0-12 Units Subcutaneous TID WC    insulin lispro  0-6 Units Subcutaneous Nightly    enoxaparin  40 mg Subcutaneous Daily    labetalol  100 mg Oral BID    atorvastatin  20 mg Oral Nightly     PRN Meds: sodium chloride, glucose, dextrose, glucagon (rDNA), dextrose, sodium chloride flush, sodium chloride, promethazine **OR** ondansetron, polyethylene glycol, acetaminophen **OR** acetaminophen, oxyCODONE **OR** oxyCODONE      Intake/Output Summary (Last 24 hours) at 4/10/2021 0976  Last data filed at 4/10/2021 0653  Gross per 24 hour   Intake 425 ml   Output 1700 ml   Net -1275 ml       Physical Exam Performed:    BP (!) 146/74   Pulse 77   Temp 98.2 °F (36.8 °C) (Oral)   Resp 18   Ht 5' 4\" (1.626 m)   Wt 256 lb (116.1 kg)   LMP  (LMP Unknown)   SpO2 91% BMI 43.94 kg/m²     General appearance: No apparent distress, appears stated age and cooperative. HEENT: Pupils equal, round, and reactive to light. Conjunctivae/corneas clear. Neck: Supple, with full range of motion. No jugular venous distention. Trachea midline. Respiratory:  Normal respiratory effort. Clear to auscultation, bilaterally without Rales/Wheezes/Rhonchi. Cardiovascular: Regular rate and rhythm with normal S1/S2 without murmurs, rubs or gallops. Abdomen: Soft, non-tender, non-distended with normal bowel sounds. Musculoskeletal: lower extremity wrapped, wound vac present  Skin: Skin color, texture, turgor normal.  No rashes or lesions. Neurologic:  Neurovascularly intact without any focal sensory/motor deficits. Cranial nerves: II-XII intact, grossly non-focal.  Psychiatric: Alert and oriented, thought content appropriate, normal insight  Capillary Refill: Brisk,< 3 seconds   Peripheral Pulses: +2 palpable, equal bilaterally       Labs:   Recent Labs     04/08/21  0616 04/08/21  0616 04/08/21  1919 04/09/21  1137 04/10/21  0712   WBC 11.5*  --   --  9.3 7.3   HGB 6.6*   < > 8.1* 7.8* 8.2*   HCT 21.3*   < > 24.9* 24.4* 25.6*     --   --  340 366    < > = values in this interval not displayed. Recent Labs     04/08/21  0616 04/09/21  1137 04/10/21  0711    137 140   K 5.1 4.3 4.1    101 102   CO2 28 29 28   BUN 16 16 16   CREATININE 1.2 1.2 1.1   CALCIUM 8.2* 8.3 8.3     No results for input(s): AST, ALT, BILIDIR, BILITOT, ALKPHOS in the last 72 hours. No results for input(s): INR in the last 72 hours. No results for input(s): Trezevant Jerman in the last 72 hours.     Urinalysis:      Lab Results   Component Value Date    NITRU Negative 11/30/2019    WBCUA 10-20 11/30/2019    BACTERIA 2+ 11/30/2019    RBCUA 3-5 11/30/2019    BLOODU TRACE-INTACT 11/30/2019    SPECGRAV 1.020 11/30/2019    GLUCOSEU 100 11/30/2019       Radiology:  VL DUP LOWER EXTREMITY ARTERIES BILATERAL Final Result      XR FOOT LEFT (MIN 3 VIEWS)   Final Result      Status post resection of the fifth metatarsal.      Abnormal lucency in the base of the proximal phalanx of the fifth toe consistent with osteomyelitis. Correlate clinically. XR CHEST PORTABLE   Final Result     No acute findings in the chest.          XR FOOT LEFT (MIN 3 VIEWS)   Final Result   1. Lucencies in 5th metatarsal and 5th proximal phalanx, concerning for    osteomyelitis. Cannot exclude superimposed fracture. 2.  Soft tissue swelling. Lateral foot soft tissue air. MRI FOOT LEFT WO CONTRAST    (Results Pending)   MRI ANKLE LEFT WO CONTRAST    (Results Pending)           Assessment/Plan:    Active Hospital Problems    Diagnosis Date Noted    Diabetic foot infection ([facility] [address] [N10.773, L08.9] 04/08/2021    Group B streptococcal infection [A49.1] 04/08/2021    Chronic osteomyelitis of left foot with draining sinus [facility]) [M86.472] 04/08/2021    Osteomyelitis ([facility] [address] [M86.9] 04/07/2021     Infected Diabetic Foot Ulcer with suspected Osteomyelitis  - POD1 for amputation of left 5th ray with Podiatry, plan for wound vac tmro, and further staged procedures this admission  - Zosyn pending further adjustment per ID. Will need long term abx and potentially PICC line. - dressing and wound care per Podiatry  - Roxicodone for pain control     Insulin Dependant Diabetes Mellitus  - BG trend reviewed, decreased NPH to 35 units bid  - Lispro 10U TID AC, medium Dose SSI AC HS  - POC Glucose AC HS   - hypoglycemia protocol      Hx of 8mm lung base nodule  - outpatient fu for further imaging    Morbid Obesity: Body mass index is 43.94 kg/m². Evelina Behtea Complicating assessment and treatment. Placing patient at risk for multiple co-morbidities as well as early death and contributing to the patient's presentation.  Counseled on diet, weight loss    Allergic Congestion  - Cetirizine 10mg daily    Other Chronic Issues  HTN- labetalol 100mg BID  HLD - lipitor 20mg daily       DVT Prophylaxis: Lovenox  Diet: Dietary Nutrition Supplements: Wound Healing Oral Supplement, Diabetic Oral Supplement  DIET CARB CONTROL; Carb Control: 4 carb choices (60 gms)/meal  Code Status: Full Code    PT/OT Eval Status: pending    Dispo -Continue inpatient care, podiatry planning on further intervention on monday [facility]  Attending Physician

## 2021-04-10 NOTE — PROGRESS NOTES
Patient is A&O x4.  RA, sat 89%. Patient placed back on one liter, sat 94%. No complaints of SOB. Medicated for c/o left foot pain as needed. Respirations appear to easy and unlabored. Lungs clear. Respirations easy with no complaints of cough. No complaints of nausea/vomiting/diarrhea. Up with assist/walker to the bathroom/BSC as needed. Pure wick in place. IVPB infusing per left hand PIV as ordered. Tolerating diabetic diet. Ace wraps to BLE. Plan of care and safety measures reviewed with the patient. Call light in reach and bed alarm in place. Will continue to monitor.   Electronically signed by Claribel Swan RN on 4/9/2021 at 10:39 PM

## 2021-04-10 NOTE — PROGRESS NOTES
Podiatric Surgery Daily Progress Note  Ana Sy      Subjective :   Patient seen and examined this am at the bedside. Patient denies any acute overnight events. Patient denies N/V/F/C/SOB. Patient denies calf pain, thigh pain, chest pain. Review of Systems: A 12 point review of symptoms is unremarkable with the exception of the chief complaint. Patient specifically denies nausea, fever, vomiting, chills, shortness of breath, chest pain, abdominal pain, constipation or difficulty urinating. Objective     BP (!) 146/74   Pulse 77   Temp 98.2 °F (36.8 °C) (Oral)   Resp 18   Ht 5' 4\" (1.626 m)   Wt 256 lb (116.1 kg)   LMP  (LMP Unknown)   SpO2 91%   BMI 43.94 kg/m²     I/O:    Intake/Output Summary (Last 24 hours) at 4/10/2021 0831  Last data filed at 4/10/2021 0653  Gross per 24 hour   Intake 425 ml   Output 1700 ml   Net -1275 ml              Wt Readings from Last 3 Encounters:   04/07/21 256 lb (116.1 kg)   12/29/20 257 lb (116.6 kg)   12/22/20 157 lb (71.2 kg)       LABS:    Recent Labs     04/09/21  1137 04/10/21  0712   WBC 9.3 7.3   HGB 7.8* 8.2*   HCT 24.4* 25.6*    366        Recent Labs     04/10/21  0711      K 4.1      CO2 28   BUN 16   CREATININE 1.1        No results for input(s): PROT, INR, APTT in the last 72 hours. LOWER EXTREMITY EXAMINATION    Dressing to bilateral LE intact. No strikethrough noted to the external dressing. Minimal sanguinous drainage noted to internal layers of RLE dressing. LLE dressing left clean, dry, intact at this time. Wound VAC noted to have excellent seal, set to 125 mmHg with half-strength Dakin's and 1 L saline at 10 mL for 10 minutes every 2 hours. Serosanguineous drainage noted to wound VAC canister. Patient is able to wiggle digits of LLE, gross sensation intact to digits of LLE. No pain with calf compression noted to LLE. FOCUSED RLE EXAMINATION:    VASCULAR: DP and PT pulses nonpalpable.   Upon Doppler examination, in 5th metatarsal and 5th proximal phalanx,    concerning for osteomyelitis.  Cannot exclude superimposed fracture.  No    dislocation.     Soft tissues:  Soft tissue swelling.  Lateral foot soft tissue air.  No    radiopaque foreign body.           Electronically signed by Greg Murillo M.D. on 04-07-21 at 0111       Impression   1.  Lucencies in 5th metatarsal and 5th proximal phalanx, concerning for    osteomyelitis.  Cannot exclude superimposed fracture. 2.  Soft tissue swelling.  Lateral foot soft tissue air.             Arterial duplex 4/8/2021 -preliminary  Tech Comments   Right   The right ankle/brachial index is 0.96 (PT-144 mmHg; DP-136 mmHg). There is normal, multiphasic flow identified in the common femoral artery,   suggesting no evidence of significant aorto-iliac inflow disease. There is atherosclerotic plaque involving the femoro-popliteal level with no   significantly elevated velocities, consistent with <50% stenosis. Limited visualization in the calf due to thickened skin that could not be   penetrated with ultrasound from the mid to distal calf. Left   The left ankle/brachial index is 0.96 (PT-144 mmHg; DP-130 mmHg). There is normal, multiphasic flow identified in the common femoral artery,   suggesting no evidence of significant aorto-iliac inflow disease. There is atherosclerotic plaque involving the femoro-popliteal level with no   significantly elevated velocities, consistent with <50% stenosis. Monophasic   flow is demonstrated throughout the left lower extremity likely due to   hyperemic state. Limited visualization in the distal calf and ankle area due to   wounds/dressings. There are no previous studies for comparison.          ASSESSMENT/PLAN  -S/P Left foot I&D with 5th metatarsal resection packed open 2/2 Diabetic foot ulceration, OM and gas gangrene, left foot, Earl 3  -Cellulitis, left lower extremity; improving  -Peripheral arterial disease, bilateral lower

## 2021-04-11 LAB
ABO/RH: NORMAL
ANAEROBIC CULTURE: ABNORMAL
ANAEROBIC CULTURE: ABNORMAL
ANION GAP SERPL CALCULATED.3IONS-SCNC: 9 MMOL/L (ref 3–16)
ANTIBODY SCREEN: NORMAL
BASOPHILS ABSOLUTE: 0 K/UL (ref 0–0.2)
BASOPHILS RELATIVE PERCENT: 0.8 %
BLOOD CULTURE, ROUTINE: NORMAL
BUN BLDV-MCNC: 15 MG/DL (ref 7–20)
CALCIUM SERPL-MCNC: 8.4 MG/DL (ref 8.3–10.6)
CHLORIDE BLD-SCNC: 103 MMOL/L (ref 99–110)
CO2: 29 MMOL/L (ref 21–32)
CREAT SERPL-MCNC: 1.1 MG/DL (ref 0.6–1.2)
CULTURE SURGICAL: ABNORMAL
CULTURE, BLOOD 2: NORMAL
EOSINOPHILS ABSOLUTE: 0.3 K/UL (ref 0–0.6)
EOSINOPHILS RELATIVE PERCENT: 4.5 %
GFR AFRICAN AMERICAN: 58
GFR NON-AFRICAN AMERICAN: 48
GLUCOSE BLD-MCNC: 101 MG/DL (ref 70–99)
GLUCOSE BLD-MCNC: 104 MG/DL (ref 70–99)
GLUCOSE BLD-MCNC: 107 MG/DL (ref 70–99)
GLUCOSE BLD-MCNC: 124 MG/DL (ref 70–99)
GLUCOSE BLD-MCNC: 168 MG/DL (ref 70–99)
GRAM STAIN RESULT: ABNORMAL
GRAM STAIN RESULT: ABNORMAL
HCT VFR BLD CALC: 26.8 % (ref 36–48)
HEMOGLOBIN: 8.4 G/DL (ref 12–16)
INR BLD: 1.17 (ref 0.86–1.14)
LYMPHOCYTES ABSOLUTE: 1 K/UL (ref 1–5.1)
LYMPHOCYTES RELATIVE PERCENT: 15.2 %
MCH RBC QN AUTO: 25.3 PG (ref 26–34)
MCHC RBC AUTO-ENTMCNC: 31.2 G/DL (ref 31–36)
MCV RBC AUTO: 81 FL (ref 80–100)
MONOCYTES ABSOLUTE: 0.5 K/UL (ref 0–1.3)
MONOCYTES RELATIVE PERCENT: 8.4 %
NEUTROPHILS ABSOLUTE: 4.5 K/UL (ref 1.7–7.7)
NEUTROPHILS RELATIVE PERCENT: 71.1 %
ORGANISM: ABNORMAL
PDW BLD-RTO: 19 % (ref 12.4–15.4)
PERFORMED ON: ABNORMAL
PLATELET # BLD: 430 K/UL (ref 135–450)
PMV BLD AUTO: 7.7 FL (ref 5–10.5)
POTASSIUM SERPL-SCNC: 4.2 MMOL/L (ref 3.5–5.1)
PROTHROMBIN TIME: 13.6 SEC (ref 10–13.2)
RBC # BLD: 3.31 M/UL (ref 4–5.2)
SODIUM BLD-SCNC: 141 MMOL/L (ref 136–145)
WBC # BLD: 6.4 K/UL (ref 4–11)

## 2021-04-11 PROCEDURE — 80048 BASIC METABOLIC PNL TOTAL CA: CPT

## 2021-04-11 PROCEDURE — 86850 RBC ANTIBODY SCREEN: CPT

## 2021-04-11 PROCEDURE — 6370000000 HC RX 637 (ALT 250 FOR IP): Performed by: STUDENT IN AN ORGANIZED HEALTH CARE EDUCATION/TRAINING PROGRAM

## 2021-04-11 PROCEDURE — 6360000002 HC RX W HCPCS: Performed by: STUDENT IN AN ORGANIZED HEALTH CARE EDUCATION/TRAINING PROGRAM

## 2021-04-11 PROCEDURE — 6370000000 HC RX 637 (ALT 250 FOR IP): Performed by: INTERNAL MEDICINE

## 2021-04-11 PROCEDURE — 86901 BLOOD TYPING SEROLOGIC RH(D): CPT

## 2021-04-11 PROCEDURE — 86900 BLOOD TYPING SEROLOGIC ABO: CPT

## 2021-04-11 PROCEDURE — 85610 PROTHROMBIN TIME: CPT

## 2021-04-11 PROCEDURE — 85025 COMPLETE CBC W/AUTO DIFF WBC: CPT

## 2021-04-11 PROCEDURE — 2580000003 HC RX 258: Performed by: STUDENT IN AN ORGANIZED HEALTH CARE EDUCATION/TRAINING PROGRAM

## 2021-04-11 PROCEDURE — 36415 COLL VENOUS BLD VENIPUNCTURE: CPT

## 2021-04-11 PROCEDURE — 1200000000 HC SEMI PRIVATE

## 2021-04-11 RX ORDER — GUAIFENESIN 600 MG/1
600 TABLET, EXTENDED RELEASE ORAL 2 TIMES DAILY
Status: DISCONTINUED | OUTPATIENT
Start: 2021-04-11 | End: 2021-04-14 | Stop reason: HOSPADM

## 2021-04-11 RX ADMIN — GUAIFENESIN 600 MG: 600 TABLET, EXTENDED RELEASE ORAL at 20:56

## 2021-04-11 RX ADMIN — POLYETHYLENE GLYCOL 3350 17 G: 17 POWDER, FOR SOLUTION ORAL at 10:47

## 2021-04-11 RX ADMIN — OXYCODONE HYDROCHLORIDE 5 MG: 5 TABLET ORAL at 09:44

## 2021-04-11 RX ADMIN — LABETALOL HYDROCHLORIDE 100 MG: 100 TABLET, FILM COATED ORAL at 20:56

## 2021-04-11 RX ADMIN — INSULIN LISPRO 10 UNITS: 100 INJECTION, SOLUTION INTRAVENOUS; SUBCUTANEOUS at 18:32

## 2021-04-11 RX ADMIN — Medication 10 ML: at 09:56

## 2021-04-11 RX ADMIN — INSULIN HUMAN 35 UNITS: 100 INJECTION, SUSPENSION SUBCUTANEOUS at 16:30

## 2021-04-11 RX ADMIN — OXYCODONE HYDROCHLORIDE 5 MG: 5 TABLET ORAL at 20:56

## 2021-04-11 RX ADMIN — GUAIFENESIN 600 MG: 600 TABLET, EXTENDED RELEASE ORAL at 12:56

## 2021-04-11 RX ADMIN — PIPERACILLIN AND TAZOBACTAM 3375 MG: 3; .375 INJECTION, POWDER, LYOPHILIZED, FOR SOLUTION INTRAVENOUS at 16:35

## 2021-04-11 RX ADMIN — INSULIN LISPRO 10 UNITS: 100 INJECTION, SOLUTION INTRAVENOUS; SUBCUTANEOUS at 09:59

## 2021-04-11 RX ADMIN — CETIRIZINE HYDROCHLORIDE 10 MG: 10 TABLET, FILM COATED ORAL at 09:42

## 2021-04-11 RX ADMIN — PIPERACILLIN AND TAZOBACTAM 3375 MG: 3; .375 INJECTION, POWDER, LYOPHILIZED, FOR SOLUTION INTRAVENOUS at 01:03

## 2021-04-11 RX ADMIN — ENOXAPARIN SODIUM 40 MG: 40 INJECTION SUBCUTANEOUS at 09:42

## 2021-04-11 RX ADMIN — INSULIN HUMAN 35 UNITS: 100 INJECTION, SUSPENSION SUBCUTANEOUS at 09:57

## 2021-04-11 RX ADMIN — LABETALOL HYDROCHLORIDE 100 MG: 100 TABLET, FILM COATED ORAL at 09:42

## 2021-04-11 RX ADMIN — Medication 10 ML: at 20:56

## 2021-04-11 RX ADMIN — PIPERACILLIN AND TAZOBACTAM 3375 MG: 3; .375 INJECTION, POWDER, LYOPHILIZED, FOR SOLUTION INTRAVENOUS at 09:42

## 2021-04-11 RX ADMIN — INSULIN LISPRO 2 UNITS: 100 INJECTION, SOLUTION INTRAVENOUS; SUBCUTANEOUS at 18:32

## 2021-04-11 RX ADMIN — ATORVASTATIN CALCIUM 20 MG: 20 TABLET, FILM COATED ORAL at 20:56

## 2021-04-11 ASSESSMENT — PAIN DESCRIPTION - DESCRIPTORS
DESCRIPTORS: ACHING;SORE
DESCRIPTORS: ACHING

## 2021-04-11 ASSESSMENT — PAIN DESCRIPTION - ORIENTATION: ORIENTATION: RIGHT

## 2021-04-11 ASSESSMENT — PAIN DESCRIPTION - PAIN TYPE
TYPE: ACUTE PAIN
TYPE: CHRONIC PAIN

## 2021-04-11 ASSESSMENT — PAIN SCALES - GENERAL
PAINLEVEL_OUTOF10: 0
PAINLEVEL_OUTOF10: 0

## 2021-04-11 ASSESSMENT — PAIN DESCRIPTION - FREQUENCY: FREQUENCY: CONTINUOUS

## 2021-04-11 NOTE — PROGRESS NOTES
Patient resting in bed, Schrader Fall Risk: High (45 and higher), Pain Level: 0, vitals stable, mild right shoulder pain controlled with oxycodone, BLE dressings CDI, would vac in place, assisted to position of comfort, call light and belongings in reach, encouraged to call with needs, will continue to monitor.   Vitals:    04/10/21 2035   BP: (!) 142/80   Pulse: 76   Resp: 18   Temp: 98.3 °F (36.8 °C)   SpO2: 94%

## 2021-04-11 NOTE — PLAN OF CARE
Problem: Falls - Risk of:  Goal: Will remain free from falls  Description: Will remain free from falls  Note: Pt free from injury or falls at this time, fall precautions in place, bed in low position, side rail up x2, Schrader Fall Risk: High (45 and higher), bed alarm on, reoriented to room and call light, reminded not to get up without assistance, call light in reach, will continue to monitor. Pt verbalizes understanding of fall risk procedures.        Problem: Pain:  Goal: Pain level will decrease  Description: Pain level will decrease  Note: Patient reports mild chronic pain to right shoulder, xray showed arthritis, patient offered hot pack for comfort, patient denies need for pain meds or heat pack     Problem: Skin Integrity:  Goal: Will show no infection signs and symptoms  Description: Will show no infection signs and symptoms  Note: BLE dressings CDI, vitals stable

## 2021-04-11 NOTE — PLAN OF CARE
Podiatric Surgery Note  Naseem Nix      -Discussed surgical intervention with patient at bedside. All potential risks, benefits, and complications were discussed with the patient prior to the scheduling of surgery. No guarantees or promises were made to what the outcomes will be.  The patient wished to proceed with surgery, and informed written consent was obtained, signed, and placed on the patient's chart.  -Lovenox held  -NPO at midnight    Gideon Bragg DPM  Podiatric Resident, PGY-1  Pager #: [phone] or Perfect Serve

## 2021-04-11 NOTE — PROGRESS NOTES
Hospitalist Progress Note      PCP: Luann Juan MD    Date of Admission: 4/6/2021    Chief Complaint: Foot Pain     Hospital Course: 75yo female PMH IDDM complicated by neuropathy and chronic foot wounds and comorbid obesity, HTN, HLD, and chronic venous stasis. Admitted for diabetic foot ulcer with infection and underlying osteomyelitis. Was taken to OR on day 1 of admission with Podiatry for debridement and resection of nonviable bone. Pt tolerated the procedure well. However the morning after the procedure had significant anemia with Hb 6.6 from 8.6. Was ordered 2U PRBCs to be transfused.      Subjective:   Says having upper throat irritation, and having increased mucus production    Medications:  Reviewed    Infusion Medications    sodium chloride      dextrose      sodium chloride       Scheduled Medications    insulin NPH  35 Units Subcutaneous BID AC    cetirizine  10 mg Oral Daily    insulin lispro  10 Units Subcutaneous TID WC    sodium chloride flush  5-40 mL Intravenous 2 times per day    piperacillin-tazobactam  3,375 mg Intravenous Q8H    insulin lispro  0-12 Units Subcutaneous TID WC    insulin lispro  0-6 Units Subcutaneous Nightly    [Held by provider] enoxaparin  40 mg Subcutaneous Daily    labetalol  100 mg Oral BID    atorvastatin  20 mg Oral Nightly     PRN Meds: sodium chloride, glucose, dextrose, glucagon (rDNA), dextrose, sodium chloride flush, sodium chloride, promethazine **OR** ondansetron, polyethylene glycol, acetaminophen **OR** acetaminophen, oxyCODONE **OR** oxyCODONE      Intake/Output Summary (Last 24 hours) at 4/11/2021 1100  Last data filed at 4/11/2021 0600  Gross per 24 hour   Intake 840 ml   Output 1785 ml   Net -945 ml       Physical Exam Performed:    BP (!) 141/56   Pulse 82   Temp 98.4 °F (36.9 °C) (Oral)   Resp 18   Ht 5' 4\" (1.626 m)   Wt 256 lb (116.1 kg)   LMP  (LMP Unknown)   SpO2 93%   BMI 43.94 kg/m²     General appearance: No apparent distress, appears stated age and cooperative. HEENT: Pupils equal, round, and reactive to light. Conjunctivae/corneas clear. Neck: Supple, with full range of motion. No jugular venous distention. Trachea midline. Respiratory:  Normal respiratory effort. Clear to auscultation, bilaterally without Rales/Wheezes/Rhonchi. Cardiovascular: Regular rate and rhythm with normal S1/S2 without murmurs, rubs or gallops. Abdomen: Soft, non-tender, non-distended with normal bowel sounds. Musculoskeletal: bilateral lower extremity wrapped, wound vac present left foot  Skin: Skin color, texture, turgor normal.  No rashes or lesions. Neurologic:  Neurovascularly intact without any focal sensory/motor deficits. Cranial nerves: II-XII intact, grossly non-focal.  Psychiatric: Alert and oriented, thought content appropriate, normal insight  Capillary Refill: Brisk,< 3 seconds   Peripheral Pulses: +2 palpable, equal bilaterally       Labs:   Recent Labs     04/09/21  1137 04/10/21  0712 04/11/21  0752   WBC 9.3 7.3 6.4   HGB 7.8* 8.2* 8.4*   HCT 24.4* 25.6* 26.8*    366 430     Recent Labs     04/09/21  1137 04/10/21  0711 04/11/21  0752    140 141   K 4.3 4.1 4.2    102 103   CO2 29 28 29   BUN 16 16 15   CREATININE 1.2 1.1 1.1   CALCIUM 8.3 8.3 8.4     No results for input(s): AST, ALT, BILIDIR, BILITOT, ALKPHOS in the last 72 hours. Recent Labs     04/11/21  0925   INR 1.17*     No results for input(s): Amber Tyonek in the last 72 hours. Urinalysis:      Lab Results   Component Value Date    NITRU Negative 11/30/2019    WBCUA 10-20 11/30/2019    BACTERIA 2+ 11/30/2019    RBCUA 3-5 11/30/2019    BLOODU TRACE-INTACT 11/30/2019    SPECGRAV 1.020 11/30/2019    GLUCOSEU 100 11/30/2019       Radiology:  XR SHOULDER RIGHT (MIN 2 VIEWS)   Final Result      1. No findings for acute traumatic bony abnormality. 2.  Severe arthritic changes in the right shoulder as described.       MRI FOOT LEFT WO CONTRAST   Final Result      1. Status post partial 5th ray amputation. The remaining toe again demonstrates evidence of osteomyelitis in the proximal phalanx. Bony defect in the base of the proximal phalanx from operative debridement and/or infection. 2.  Multifocal mild marrow signal alteration as described in the mid foot, which is probably related to presence of the TMT degenerative arthrosis and/or regional hyperemia, but early osteomyelitis is not excluded. 3.  Postoperative changes in the soft tissues. No drainable fluid collections identified. MRI ANKLE LEFT WO CONTRAST   Final Result      1. Status post partial 5th ray amputation. The remaining toe again demonstrates evidence of osteomyelitis in the proximal phalanx. Bony defect in the base of the proximal phalanx from operative debridement and/or infection. 2.  Multifocal mild marrow signal alteration as described in the mid foot, which is probably related to presence of the TMT degenerative arthrosis and/or regional hyperemia, but early osteomyelitis is not excluded. 3.  Postoperative changes in the soft tissues. No drainable fluid collections identified. VL DUP LOWER EXTREMITY ARTERIES BILATERAL   Final Result      XR FOOT LEFT (MIN 3 VIEWS)   Final Result      Status post resection of the fifth metatarsal.      Abnormal lucency in the base of the proximal phalanx of the fifth toe consistent with osteomyelitis. Correlate clinically. XR CHEST PORTABLE   Final Result     No acute findings in the chest.          XR FOOT LEFT (MIN 3 VIEWS)   Final Result   1. Lucencies in 5th metatarsal and 5th proximal phalanx, concerning for    osteomyelitis. Cannot exclude superimposed fracture. 2.  Soft tissue swelling. Lateral foot soft tissue air.                   Assessment/Plan:    Active Hospital Problems    Diagnosis Date Noted    [facility] [B99.564, L08.9] 04/08/2021    Group B streptococcal infection

## 2021-04-11 NOTE — PROGRESS NOTES
Podiatric Surgery Daily Progress Note  aMrie Nolasco      Subjective :   Patient seen and examined this am at the bedside. Patient denies any acute overnight events. Patient denies N/V/F/C/SOB. Patient denies calf pain, thigh pain, chest pain. Review of Systems: A 12 point review of symptoms is unremarkable with the exception of the chief complaint. Patient specifically denies nausea, fever, vomiting, chills, shortness of breath, chest pain, abdominal pain, constipation or difficulty urinating. Objective     /71   Pulse 70   Temp 98.1 °F (36.7 °C) (Oral)   Resp 18   Ht 5' 4\" (1.626 m)   Wt 256 lb (116.1 kg)   LMP  (LMP Unknown)   SpO2 95%   BMI 43.94 kg/m²     I/O:    Intake/Output Summary (Last 24 hours) at 4/11/2021 0905  Last data filed at 4/11/2021 0600  Gross per 24 hour   Intake 1080 ml   Output 1785 ml   Net -705 ml              Wt Readings from Last 3 Encounters:   04/07/21 256 lb (116.1 kg)   12/29/20 257 lb (116.6 kg)   12/22/20 157 lb (71.2 kg)       LABS:    Recent Labs     04/10/21  0712 04/11/21  0752   WBC 7.3 6.4   HGB 8.2* 8.4*   HCT 25.6* 26.8*    430        Recent Labs     04/11/21  0752      K 4.2      CO2 29   BUN 15   CREATININE 1.1        No results for input(s): PROT, INR, APTT in the last 72 hours. LOWER EXTREMITY EXAMINATION    Dressing to bilateral LE intact. No strikethrough noted to the external dressing. Minimal sanguinous drainage noted to internal layers of RLE dressing. LLE dressing left clean, dry, intact at this time. Wound VAC noted to have excellent seal, set to 125 mmHg with half-strength Dakin's and 1 L saline at 10 mL for 10 minutes every 2 hours. Serosanguineous drainage noted to wound VAC canister. Patient is able to wiggle digits of LLE, gross sensation intact to digits of LLE. No pain with calf compression noted to LLE. FOCUSED RLE EXAMINATION:    VASCULAR: DP and PT pulses nonpalpable.   Upon Doppler examination, DP signals monophasic and PT signals biphasic. CFT is sluggish to the digits of the foot. Skin temperature warm to cool right lower extremity. +1 pitting edema noted. No pain with calf compression.     NEUROLOGIC: Gross and epicritic sensation is diminished. Protective sensation is diminished at all pedal sites.      DERMATOLOGIC: Diffuse dermatologic changes noted. Right lower extremity is a closed soft tissue envelope. Chronic, xerotic skin changes noted. Nails 1-4 are thickened, discolored. Webspaces 1-3 are clean, dry, intact. MUSCULOSKELETAL: Muscle strength is 5/5 for all pedal groups tested. No pain with palpation of RLE. Ankle joint ROM is decreased in dorsiflexion with the knee extended. History of previous partial fifth ray amputation right foot. IMAGING:  MRI left foot 4/10  Narrative   MRI FOOT LEFT WO CONTRAST, MRI ANKLE LEFT WO CONTRAST       Indication: Status post left foot incision and debridement and 5th ray resection.       Comparison: Radiographs 4/7/21       Technique: Multiplanar multisequence MR imaging of the left foot and left ankle performed without intravenous contrast.       Findings:       Status post partial amputation of the 5th ray with absence of the metatarsal. The 5th toe remains. There is evidence of osteomyelitis within the 5th proximal phalanx as shown on previous radiographs. Bony defect in the base of the proximal phalanx from    interval operative treatment or the ongoing infection       T2 hyperintense marrow signal alteration within the base and proximal shaft of the 4th metatarsal and the 3rd metatarsal and to a minimal degree in the 2nd metatarsal base. Mild signal changes within the cuboid, lateral and middle cuneiforms.  The signal    on T1-weighted images only mildly hypointense and this can be seen with reactive edema from the TMT joint arthropathy and/or hyperemia, although early osteomyelitis be difficult to exclude.       Postoperative in the soft tissues in the lateral foot. Adjacent susceptibility from soft tissue gas likely postsurgical. There is diffuse T2 hyperintense signal throughout the deep and superficial soft tissues, but no evidence of a viable fluid    collection.       There is no significant marrow signal abnormality or erosion identified in the ankle. Intramuscular and subcutaneous edema signal is present diffusely. No drainable fluid collection.           Impression       1.  Status post partial 5th ray amputation. The remaining toe again demonstrates evidence of osteomyelitis in the proximal phalanx. Bony defect in the base of the proximal phalanx from operative debridement and/or infection. 2.  Multifocal mild marrow signal alteration as described in the mid foot, which is probably related to presence of the TMT degenerative arthrosis and/or regional hyperemia, but early osteomyelitis is not excluded. 3.  Postoperative changes in the soft tissues. No drainable fluid collections identified. Xray left foot 4/7/2021 - post-op  Narrative   LEFT FOOT:       HISTORY: Postop study.       TECHNIQUE: 3 views obtained.       COMPARISON: 4/17/2021.       FINDINGS: There has been surgical resection of the fifth metatarsal. Lucency in the base of the proximal phalanx of the fifth toe is noted, with lateral and proximal cortical loss.       There is localized soft tissue swelling with scattered gas consistent with postoperative changes in the lateral aspect of the foot.       Other bony structures appear intact.           Impression       Status post resection of the fifth metatarsal.       Abnormal lucency in the base of the proximal phalanx of the fifth toe consistent with osteomyelitis.  Correlate clinically.                   Xray left foot 4/7/2021 - pre-op  Narrative       Patient: Nargis Reynolds  Time Out: 01:11   Exam(s): FILM LEFT FOOT        EXAM:     XR Left Foot Complete, 3 or More Views       CLINICAL HISTORY:     ulcer, infection.     open 2/2 Diabetic foot ulceration, OM and gas gangrene, left foot, Earl 3  -Cellulitis, left lower extremity; improving  -Peripheral arterial disease, bilateral lower extremity  -Peripheral vascular disease, bilateral lower extremity  -Diabetes mellitus type 2 with peripheral neuropathy    -Patient seen and evaluated at the bedside this AM.  -VSS, no leukocytosis noted (WBC 6.4)  -.8. ESR greater than 120.  -Wound culture: Group B strep, Providencia stuartii, Morganella morganii, corynebacterium striatum  -Surgical pathology pending; will follow up results  -Blood culture x2- NGTD  -X-rays reviewed and impressions noted above. -Arterial duplex reviewed and impression noted above. -MRI reviewed, impression noted above; marrow signal alteration noted in midfoot, specifically proximal fourth metatarsal.  Given close proximity to wound, and the Intra-Op appearance of the fifth metatarsal, higher concern for osteomyelitis and will plan to treat accordingly. Patient will likely need IV antibiotics on discharge  -Dressing to left lower extremity left clean, dry, intact at this time. Instil Wound VAC set to 125 mmHg with half-strength Dakin's and 1 L saline set to 10 mL for 10 minutes every 2 hours  -Dressing applied to right lower extremity consisting of Adaptic, gauze, Kerlix, Ace bandage. -ID following, continue antibiotics per their recommendations; currently on Zosyn  -Social work following for discharge planning and likely SNF placement.  -Wound VAC paperwork completed, given to social work. -PT/OT following.  -Nonweightbearing left lower extremity.  -Patient will return to operating room tomorrow for incision and drainage, fifth digit amputation, wound debridement, wound VAC application of left lower extremity.  -Consent to be obtained  -NPO at midnight  -PT/INR, type and screen ordered  -Surgical clearance per primary team.    DISPO:S/P Left foot I&D with 5th metatarsal resection.  Patient will require return to the OR tomorrow AM for incision and drainage, for stage amputation, wound debridement, wound VAC application of left lower extremity. Continue IV antibiotics per ID recommendations. Instill wound VAC to left lower extremity. Patient to remain in house at this time. Patient examined and evaluated at bedside with Elena Villatoro, [facility] [place], [place]  Podiatric Resident, PGY-1  Pager #: [phone] or Perfect Serve    Patient was seen and evaluated at bedside. Agree with residents assessment and treatment plan. Prolonged discussion with patient regarding the risks vs benefits of the planned procedure. All questions answered, no guarantees given. Stressed to patient that she must be NWB on the left lower extremity to allow the ulcer to heal as it is a large wound on the weight bearing surface of her foot. Recommend ECF placement at d/c. MRI reviewed. Area of marrow edema questionable for OM vs arthritic changes. As this area corresponds to the area of infection with soft tissue gas, it needs to be treated as OM. Dr. Dom Medellin will be performing her surgery tomorrow due to scheduling conflict to prevent further prolonged hospital stay.   Elena Villatoro, BENITO

## 2021-04-12 ENCOUNTER — APPOINTMENT (OUTPATIENT)
Dept: GENERAL RADIOLOGY | Age: 76
DRG: 616 | End: 2021-04-12
Payer: MEDICARE

## 2021-04-12 ENCOUNTER — ANESTHESIA EVENT (OUTPATIENT)
Dept: OPERATING ROOM | Age: 76
DRG: 616 | End: 2021-04-12
Payer: MEDICARE

## 2021-04-12 ENCOUNTER — ANESTHESIA (OUTPATIENT)
Dept: OPERATING ROOM | Age: 76
DRG: 616 | End: 2021-04-12
Payer: MEDICARE

## 2021-04-12 VITALS
DIASTOLIC BLOOD PRESSURE: 56 MMHG | RESPIRATION RATE: 15 BRPM | TEMPERATURE: 96.8 F | OXYGEN SATURATION: 97 % | SYSTOLIC BLOOD PRESSURE: 128 MMHG

## 2021-04-12 LAB
ANION GAP SERPL CALCULATED.3IONS-SCNC: 9 MMOL/L (ref 3–16)
BASOPHILS ABSOLUTE: 0 K/UL (ref 0–0.2)
BASOPHILS RELATIVE PERCENT: 0.7 %
BUN BLDV-MCNC: 20 MG/DL (ref 7–20)
CALCIUM SERPL-MCNC: 8.6 MG/DL (ref 8.3–10.6)
CHLORIDE BLD-SCNC: 105 MMOL/L (ref 99–110)
CO2: 27 MMOL/L (ref 21–32)
CREAT SERPL-MCNC: 1.1 MG/DL (ref 0.6–1.2)
EOSINOPHILS ABSOLUTE: 0.3 K/UL (ref 0–0.6)
EOSINOPHILS RELATIVE PERCENT: 4.9 %
GFR AFRICAN AMERICAN: 58
GFR NON-AFRICAN AMERICAN: 48
GLUCOSE BLD-MCNC: 100 MG/DL (ref 70–99)
GLUCOSE BLD-MCNC: 141 MG/DL (ref 70–99)
GLUCOSE BLD-MCNC: 152 MG/DL (ref 70–99)
GLUCOSE BLD-MCNC: 181 MG/DL (ref 70–99)
GLUCOSE BLD-MCNC: 68 MG/DL (ref 70–99)
GLUCOSE BLD-MCNC: 79 MG/DL (ref 70–99)
HCT VFR BLD CALC: 26.6 % (ref 36–48)
HEMOGLOBIN: 8.4 G/DL (ref 12–16)
LYMPHOCYTES ABSOLUTE: 1 K/UL (ref 1–5.1)
LYMPHOCYTES RELATIVE PERCENT: 16.8 %
MCH RBC QN AUTO: 25.6 PG (ref 26–34)
MCHC RBC AUTO-ENTMCNC: 31.5 G/DL (ref 31–36)
MCV RBC AUTO: 81.4 FL (ref 80–100)
MONOCYTES ABSOLUTE: 0.6 K/UL (ref 0–1.3)
MONOCYTES RELATIVE PERCENT: 9.2 %
NEUTROPHILS ABSOLUTE: 4.3 K/UL (ref 1.7–7.7)
NEUTROPHILS RELATIVE PERCENT: 68.4 %
PDW BLD-RTO: 19.2 % (ref 12.4–15.4)
PERFORMED ON: ABNORMAL
PERFORMED ON: NORMAL
PLATELET # BLD: 407 K/UL (ref 135–450)
PMV BLD AUTO: 7.6 FL (ref 5–10.5)
POTASSIUM SERPL-SCNC: 4.2 MMOL/L (ref 3.5–5.1)
RBC # BLD: 3.26 M/UL (ref 4–5.2)
SODIUM BLD-SCNC: 141 MMOL/L (ref 136–145)
WBC # BLD: 6.2 K/UL (ref 4–11)

## 2021-04-12 PROCEDURE — 6370000000 HC RX 637 (ALT 250 FOR IP): Performed by: PODIATRIST

## 2021-04-12 PROCEDURE — 73630 X-RAY EXAM OF FOOT: CPT

## 2021-04-12 PROCEDURE — 85025 COMPLETE CBC W/AUTO DIFF WBC: CPT

## 2021-04-12 PROCEDURE — 87077 CULTURE AEROBIC IDENTIFY: CPT

## 2021-04-12 PROCEDURE — 3600000004 HC SURGERY LEVEL 4 BASE: Performed by: PODIATRIST

## 2021-04-12 PROCEDURE — 6370000000 HC RX 637 (ALT 250 FOR IP): Performed by: STUDENT IN AN ORGANIZED HEALTH CARE EDUCATION/TRAINING PROGRAM

## 2021-04-12 PROCEDURE — 94761 N-INVAS EAR/PLS OXIMETRY MLT: CPT

## 2021-04-12 PROCEDURE — 6370000000 HC RX 637 (ALT 250 FOR IP): Performed by: INTERNAL MEDICINE

## 2021-04-12 PROCEDURE — 99233 SBSQ HOSP IP/OBS HIGH 50: CPT | Performed by: INTERNAL MEDICINE

## 2021-04-12 PROCEDURE — 2580000003 HC RX 258: Performed by: PODIATRIST

## 2021-04-12 PROCEDURE — 2580000003 HC RX 258: Performed by: STUDENT IN AN ORGANIZED HEALTH CARE EDUCATION/TRAINING PROGRAM

## 2021-04-12 PROCEDURE — 6360000002 HC RX W HCPCS: Performed by: STUDENT IN AN ORGANIZED HEALTH CARE EDUCATION/TRAINING PROGRAM

## 2021-04-12 PROCEDURE — 6360000002 HC RX W HCPCS: Performed by: NURSE ANESTHETIST, CERTIFIED REGISTERED

## 2021-04-12 PROCEDURE — 94150 VITAL CAPACITY TEST: CPT

## 2021-04-12 PROCEDURE — 87206 SMEAR FLUORESCENT/ACID STAI: CPT

## 2021-04-12 PROCEDURE — 88305 TISSUE EXAM BY PATHOLOGIST: CPT

## 2021-04-12 PROCEDURE — 87102 FUNGUS ISOLATION CULTURE: CPT

## 2021-04-12 PROCEDURE — 36415 COLL VENOUS BLD VENIPUNCTURE: CPT

## 2021-04-12 PROCEDURE — 87205 SMEAR GRAM STAIN: CPT

## 2021-04-12 PROCEDURE — 2500000003 HC RX 250 WO HCPCS: Performed by: NURSE ANESTHETIST, CERTIFIED REGISTERED

## 2021-04-12 PROCEDURE — 80048 BASIC METABOLIC PNL TOTAL CA: CPT

## 2021-04-12 PROCEDURE — 7100000000 HC PACU RECOVERY - FIRST 15 MIN: Performed by: PODIATRIST

## 2021-04-12 PROCEDURE — 2580000003 HC RX 258: Performed by: NURSE ANESTHETIST, CERTIFIED REGISTERED

## 2021-04-12 PROCEDURE — 87070 CULTURE OTHR SPECIMN AEROBIC: CPT

## 2021-04-12 PROCEDURE — 2580000003 HC RX 258: Performed by: INTERNAL MEDICINE

## 2021-04-12 PROCEDURE — 3600000014 HC SURGERY LEVEL 4 ADDTL 15MIN: Performed by: PODIATRIST

## 2021-04-12 PROCEDURE — 87015 SPECIMEN INFECT AGNT CONCNTJ: CPT

## 2021-04-12 PROCEDURE — 0KBW0ZZ EXCISION OF LEFT FOOT MUSCLE, OPEN APPROACH: ICD-10-PCS | Performed by: PODIATRIST

## 2021-04-12 PROCEDURE — 6360000002 HC RX W HCPCS: Performed by: INTERNAL MEDICINE

## 2021-04-12 PROCEDURE — 7100000001 HC PACU RECOVERY - ADDTL 15 MIN: Performed by: PODIATRIST

## 2021-04-12 PROCEDURE — 2709999900 HC NON-CHARGEABLE SUPPLY: Performed by: PODIATRIST

## 2021-04-12 PROCEDURE — 3700000001 HC ADD 15 MINUTES (ANESTHESIA): Performed by: PODIATRIST

## 2021-04-12 PROCEDURE — 0Y6Y0Z1 DETACHMENT AT LEFT 5TH TOE, HIGH, OPEN APPROACH: ICD-10-PCS | Performed by: PODIATRIST

## 2021-04-12 PROCEDURE — 1200000000 HC SEMI PRIVATE

## 2021-04-12 PROCEDURE — 0LBW0ZZ EXCISION OF LEFT FOOT TENDON, OPEN APPROACH: ICD-10-PCS | Performed by: PODIATRIST

## 2021-04-12 PROCEDURE — 88311 DECALCIFY TISSUE: CPT

## 2021-04-12 PROCEDURE — 87116 MYCOBACTERIA CULTURE: CPT

## 2021-04-12 PROCEDURE — 6360000002 HC RX W HCPCS: Performed by: PODIATRIST

## 2021-04-12 PROCEDURE — 3700000000 HC ANESTHESIA ATTENDED CARE: Performed by: PODIATRIST

## 2021-04-12 RX ORDER — SODIUM CHLORIDE 9 MG/ML
25 INJECTION, SOLUTION INTRAVENOUS PRN
Status: DISCONTINUED | OUTPATIENT
Start: 2021-04-12 | End: 2021-04-14 | Stop reason: HOSPADM

## 2021-04-12 RX ORDER — ROCURONIUM BROMIDE 10 MG/ML
INJECTION, SOLUTION INTRAVENOUS PRN
Status: DISCONTINUED | OUTPATIENT
Start: 2021-04-12 | End: 2021-04-12 | Stop reason: SDUPTHER

## 2021-04-12 RX ORDER — SODIUM CHLORIDE 0.9 % (FLUSH) 0.9 %
5-40 SYRINGE (ML) INJECTION PRN
Status: DISCONTINUED | OUTPATIENT
Start: 2021-04-12 | End: 2021-04-14 | Stop reason: HOSPADM

## 2021-04-12 RX ORDER — ONDANSETRON 2 MG/ML
INJECTION INTRAMUSCULAR; INTRAVENOUS PRN
Status: DISCONTINUED | OUTPATIENT
Start: 2021-04-12 | End: 2021-04-12 | Stop reason: SDUPTHER

## 2021-04-12 RX ORDER — LIDOCAINE HYDROCHLORIDE 10 MG/ML
INJECTION, SOLUTION INFILTRATION; PERINEURAL PRN
Status: DISCONTINUED | OUTPATIENT
Start: 2021-04-12 | End: 2021-04-12 | Stop reason: SDUPTHER

## 2021-04-12 RX ORDER — ONDANSETRON 2 MG/ML
4 INJECTION INTRAMUSCULAR; INTRAVENOUS
Status: ACTIVE | OUTPATIENT
Start: 2021-04-12 | End: 2021-04-12

## 2021-04-12 RX ORDER — METRONIDAZOLE 500 MG/1
500 TABLET ORAL EVERY 8 HOURS SCHEDULED
Status: DISCONTINUED | OUTPATIENT
Start: 2021-04-12 | End: 2021-04-14 | Stop reason: HOSPADM

## 2021-04-12 RX ORDER — FENTANYL CITRATE 50 UG/ML
25 INJECTION, SOLUTION INTRAMUSCULAR; INTRAVENOUS EVERY 5 MIN PRN
Status: DISCONTINUED | OUTPATIENT
Start: 2021-04-12 | End: 2021-04-14 | Stop reason: HOSPADM

## 2021-04-12 RX ORDER — MAGNESIUM HYDROXIDE 1200 MG/15ML
LIQUID ORAL CONTINUOUS PRN
Status: COMPLETED | OUTPATIENT
Start: 2021-04-12 | End: 2021-04-12

## 2021-04-12 RX ORDER — SODIUM CHLORIDE, SODIUM LACTATE, POTASSIUM CHLORIDE, CALCIUM CHLORIDE 600; 310; 30; 20 MG/100ML; MG/100ML; MG/100ML; MG/100ML
INJECTION, SOLUTION INTRAVENOUS CONTINUOUS PRN
Status: DISCONTINUED | OUTPATIENT
Start: 2021-04-12 | End: 2021-04-12 | Stop reason: SDUPTHER

## 2021-04-12 RX ORDER — SODIUM CHLORIDE 0.9 % (FLUSH) 0.9 %
5-40 SYRINGE (ML) INJECTION EVERY 12 HOURS SCHEDULED
Status: DISCONTINUED | OUTPATIENT
Start: 2021-04-12 | End: 2021-04-14 | Stop reason: HOSPADM

## 2021-04-12 RX ORDER — FENTANYL CITRATE 50 UG/ML
50 INJECTION, SOLUTION INTRAMUSCULAR; INTRAVENOUS EVERY 5 MIN PRN
Status: DISCONTINUED | OUTPATIENT
Start: 2021-04-12 | End: 2021-04-14 | Stop reason: HOSPADM

## 2021-04-12 RX ORDER — LABETALOL HYDROCHLORIDE 5 MG/ML
5 INJECTION, SOLUTION INTRAVENOUS EVERY 10 MIN PRN
Status: DISCONTINUED | OUTPATIENT
Start: 2021-04-12 | End: 2021-04-14 | Stop reason: HOSPADM

## 2021-04-12 RX ORDER — PROPOFOL 10 MG/ML
INJECTION, EMULSION INTRAVENOUS PRN
Status: DISCONTINUED | OUTPATIENT
Start: 2021-04-12 | End: 2021-04-12 | Stop reason: SDUPTHER

## 2021-04-12 RX ORDER — FENTANYL CITRATE 50 UG/ML
INJECTION, SOLUTION INTRAMUSCULAR; INTRAVENOUS PRN
Status: DISCONTINUED | OUTPATIENT
Start: 2021-04-12 | End: 2021-04-12 | Stop reason: SDUPTHER

## 2021-04-12 RX ORDER — LIDOCAINE HYDROCHLORIDE 10 MG/ML
5 INJECTION, SOLUTION EPIDURAL; INFILTRATION; INTRACAUDAL; PERINEURAL ONCE
Status: COMPLETED | OUTPATIENT
Start: 2021-04-12 | End: 2021-04-13

## 2021-04-12 RX ORDER — HYDRALAZINE HYDROCHLORIDE 20 MG/ML
5 INJECTION INTRAMUSCULAR; INTRAVENOUS EVERY 5 MIN PRN
Status: DISCONTINUED | OUTPATIENT
Start: 2021-04-12 | End: 2021-04-14 | Stop reason: HOSPADM

## 2021-04-12 RX ADMIN — SODIUM CHLORIDE, SODIUM LACTATE, POTASSIUM CHLORIDE, AND CALCIUM CHLORIDE: .6; .31; .03; .02 INJECTION, SOLUTION INTRAVENOUS at 07:37

## 2021-04-12 RX ADMIN — ATORVASTATIN CALCIUM 20 MG: 20 TABLET, FILM COATED ORAL at 22:22

## 2021-04-12 RX ADMIN — FENTANYL CITRATE 100 MCG: 50 INJECTION, SOLUTION INTRAMUSCULAR; INTRAVENOUS at 07:31

## 2021-04-12 RX ADMIN — OXYCODONE HYDROCHLORIDE 10 MG: 5 TABLET ORAL at 22:31

## 2021-04-12 RX ADMIN — INSULIN LISPRO 2 UNITS: 100 INJECTION, SOLUTION INTRAVENOUS; SUBCUTANEOUS at 14:26

## 2021-04-12 RX ADMIN — PIPERACILLIN AND TAZOBACTAM 3375 MG: 3; .375 INJECTION, POWDER, LYOPHILIZED, FOR SOLUTION INTRAVENOUS at 10:23

## 2021-04-12 RX ADMIN — LIDOCAINE HYDROCHLORIDE 30 MG: 10 INJECTION, SOLUTION INFILTRATION; PERINEURAL at 07:23

## 2021-04-12 RX ADMIN — CETIRIZINE HYDROCHLORIDE 10 MG: 10 TABLET, FILM COATED ORAL at 10:09

## 2021-04-12 RX ADMIN — SUGAMMADEX 200 MG: 100 INJECTION, SOLUTION INTRAVENOUS at 08:13

## 2021-04-12 RX ADMIN — GUAIFENESIN 600 MG: 600 TABLET, EXTENDED RELEASE ORAL at 22:23

## 2021-04-12 RX ADMIN — GUAIFENESIN 600 MG: 600 TABLET, EXTENDED RELEASE ORAL at 10:08

## 2021-04-12 RX ADMIN — LABETALOL HYDROCHLORIDE 100 MG: 100 TABLET, FILM COATED ORAL at 10:08

## 2021-04-12 RX ADMIN — ONDANSETRON 4 MG: 2 INJECTION INTRAMUSCULAR; INTRAVENOUS at 07:31

## 2021-04-12 RX ADMIN — METRONIDAZOLE 500 MG: 500 TABLET ORAL at 22:22

## 2021-04-12 RX ADMIN — INSULIN LISPRO 10 UNITS: 100 INJECTION, SOLUTION INTRAVENOUS; SUBCUTANEOUS at 18:30

## 2021-04-12 RX ADMIN — INSULIN LISPRO 2 UNITS: 100 INJECTION, SOLUTION INTRAVENOUS; SUBCUTANEOUS at 18:29

## 2021-04-12 RX ADMIN — PIPERACILLIN AND TAZOBACTAM 3375 MG: 3; .375 INJECTION, POWDER, LYOPHILIZED, FOR SOLUTION INTRAVENOUS at 00:09

## 2021-04-12 RX ADMIN — PROPOFOL 180 MG: 10 INJECTION, EMULSION INTRAVENOUS at 07:23

## 2021-04-12 RX ADMIN — INSULIN HUMAN 35 UNITS: 100 INJECTION, SUSPENSION SUBCUTANEOUS at 18:31

## 2021-04-12 RX ADMIN — MAGNESIUM CITRATE 296 ML: 1.75 LIQUID ORAL at 14:11

## 2021-04-12 RX ADMIN — ROCURONIUM BROMIDE 50 MG: 10 INJECTION INTRAVENOUS at 07:31

## 2021-04-12 RX ADMIN — LABETALOL HYDROCHLORIDE 100 MG: 100 TABLET, FILM COATED ORAL at 22:22

## 2021-04-12 RX ADMIN — OXYCODONE HYDROCHLORIDE 10 MG: 5 TABLET ORAL at 10:17

## 2021-04-12 RX ADMIN — METRONIDAZOLE 500 MG: 500 TABLET ORAL at 18:21

## 2021-04-12 RX ADMIN — INSULIN LISPRO 10 UNITS: 100 INJECTION, SOLUTION INTRAVENOUS; SUBCUTANEOUS at 14:27

## 2021-04-12 RX ADMIN — INSULIN LISPRO 1 UNITS: 100 INJECTION, SOLUTION INTRAVENOUS; SUBCUTANEOUS at 22:21

## 2021-04-12 RX ADMIN — OXYCODONE HYDROCHLORIDE 10 MG: 5 TABLET ORAL at 14:11

## 2021-04-12 RX ADMIN — Medication 5 ML: at 22:22

## 2021-04-12 RX ADMIN — CEFTRIAXONE SODIUM 2000 MG: 2 INJECTION, POWDER, FOR SOLUTION INTRAMUSCULAR; INTRAVENOUS at 18:21

## 2021-04-12 RX ADMIN — Medication 10 ML: at 10:08

## 2021-04-12 ASSESSMENT — PULMONARY FUNCTION TESTS
PIF_VALUE: 2
PIF_VALUE: 1
PIF_VALUE: 2
PIF_VALUE: 23
PIF_VALUE: 20
PIF_VALUE: 7
PIF_VALUE: 20
PIF_VALUE: 21
PIF_VALUE: 28
PIF_VALUE: 2
PIF_VALUE: 20
PIF_VALUE: 2
PIF_VALUE: 3
PIF_VALUE: 3
PIF_VALUE: 1
PIF_VALUE: 20
PIF_VALUE: 2
PIF_VALUE: 20
PIF_VALUE: 3
PIF_VALUE: 20
PIF_VALUE: 25
PIF_VALUE: 20
PIF_VALUE: 20
PIF_VALUE: 5
PIF_VALUE: 1
PIF_VALUE: 20
PIF_VALUE: 6
PIF_VALUE: 1
PIF_VALUE: 20
PIF_VALUE: 20
PIF_VALUE: 3
PIF_VALUE: 20
PIF_VALUE: 1
PIF_VALUE: 25
PIF_VALUE: 5
PIF_VALUE: 1
PIF_VALUE: 22
PIF_VALUE: 1
PIF_VALUE: 20

## 2021-04-12 ASSESSMENT — PAIN DESCRIPTION - ONSET
ONSET: ON-GOING
ONSET: ON-GOING
ONSET_2: PROGRESSIVE
ONSET: ON-GOING

## 2021-04-12 ASSESSMENT — PAIN DESCRIPTION - DESCRIPTORS
DESCRIPTORS_2: ACHING;SORE;DISCOMFORT
DESCRIPTORS_2: ACHING;SORE;DISCOMFORT

## 2021-04-12 ASSESSMENT — PAIN DESCRIPTION - LOCATION
LOCATION_2: FOOT
LOCATION: FOOT
LOCATION: SHOULDER
LOCATION_2: FOOT
LOCATION: SHOULDER

## 2021-04-12 ASSESSMENT — PAIN SCALES - GENERAL
PAINLEVEL_OUTOF10: 6
PAINLEVEL_OUTOF10: 0

## 2021-04-12 ASSESSMENT — PAIN DESCRIPTION - FREQUENCY
FREQUENCY: CONTINUOUS
FREQUENCY: CONTINUOUS

## 2021-04-12 ASSESSMENT — PAIN DESCRIPTION - PROGRESSION: CLINICAL_PROGRESSION: NOT CHANGED

## 2021-04-12 ASSESSMENT — PAIN DESCRIPTION - PAIN TYPE
TYPE: ACUTE PAIN
TYPE: ACUTE PAIN

## 2021-04-12 ASSESSMENT — PAIN SCALES - WONG BAKER: WONGBAKER_NUMERICALRESPONSE: 0

## 2021-04-12 ASSESSMENT — PAIN DESCRIPTION - DURATION
DURATION_2: CONTINUOUS
DURATION_2: CONTINUOUS

## 2021-04-12 ASSESSMENT — PAIN DESCRIPTION - DIRECTION: RADIATING_TOWARDS: N

## 2021-04-12 ASSESSMENT — PAIN DESCRIPTION - ORIENTATION
ORIENTATION: RIGHT
ORIENTATION_2: LEFT

## 2021-04-12 ASSESSMENT — PAIN - FUNCTIONAL ASSESSMENT: PAIN_FUNCTIONAL_ASSESSMENT: PREVENTS OR INTERFERES SOME ACTIVE ACTIVITIES AND ADLS

## 2021-04-12 ASSESSMENT — PAIN DESCRIPTION - INTENSITY: RATING_2: 9

## 2021-04-12 NOTE — ANESTHESIA POSTPROCEDURE EVALUATION
Department of Anesthesiology  Postprocedure Note    Patient: Naseem Nix  MRN: [id number]  YOB: 1945  Date of evaluation: 4/12/2021  Time:  12:27 PM     Procedure Summary     Date: 04/12/21 Room / Location: [address] [place] [address] [id number] / [facility]    Anesthesia Start: 6208 Anesthesia Stop: 2428    Procedure: LEFT LOWER EXTREMITY EXCISIONAL DEBRIEDMENT MUSCLE FASCIA 11X4 CM WOUND; LEFT FIFTH TOE AMPUTATION, WOUND VAC APPLICAITON (Left ) Diagnosis:       Diabetic ulcer of left foot associated with other specified diabetes mellitus, unspecified part of foot, unspecified ulcer stage ([facility]      (DIABETIC FOOT ULCERATION)    Surgeons: Anne Luna DPM Responsible Provider: Stephanie Rivera MD    Anesthesia Type: general ASA Status: 3          Anesthesia Type: general    Meghan Phase I: Meghan Score: 9    Meghan Phase II:      Last vitals: Reviewed and per EMR flowsheets.        Anesthesia Post Evaluation    Patient participation: complete - patient participated  Level of consciousness: awake  Airway patency: patent  Nausea & Vomiting: no nausea and no vomiting  Complications: no  Cardiovascular status: hemodynamically stable  Respiratory status: acceptable  Hydration status: stable

## 2021-04-12 NOTE — PROGRESS NOTES
Patient resting in bed, Schrader Fall Risk: High (45 and higher), Pain Level: 6, vitals stable, assisted to position of comfort, call light and belongings in reach, encouraged to call with needs, will continue to monitor.   Vitals:    04/11/21 2000   BP: (!) 140/55   Pulse: 78   Resp: 16   Temp: 98.1 °F (36.7 °C)   SpO2: 95%

## 2021-04-12 NOTE — OP NOTE
Operative Note      Patient: David Abraham  YOB: 1945  MRN: [id number]    Date of Procedure: 4/12/2021    Pre-Op Diagnosis: DIABETIC FOOT ULCERATION, OSTEOMYELITIS; LEFT LOWER EXTTREMITY     Post-Op Diagnosis: Same      Procedure(s):  LEFT LOWER EXTREMITY: INCISION AND DRAINAGE. EXCISIONAL DEBRIDEMENT OF MUSCLE AND FASCIA [id number] WOUND; LEFT FIFTH TOE AMPUTATION, WOUND VAC APPLICATION    Surgeon(s):  Joni Morgan DPM    Assistant:   Resident: Chris Parker DPM    Anesthesia: General    Hemostasis: Electrocautery     Injectables: 10cc of 2% Polocaine plain     Materials: 3-0 vicryl, 4-0 nylon    Estimated Blood Loss (mL): 29EI    Complications: None    Specimens:   ID Type Source Tests Collected by Time Destination   1 : DEEP SPACE ABSCESS LEFT FOOT Tissue Tissue CULTURE, FUNGUS, CULTURE, TISSUE, CULTURE WITH SMEAR, ACID FAST BACILLIUS Joni Morgan DPM 4/12/2021 0747    A : LEFT FIFTH DIGIT Tissue Tissue SURGICAL PATHOLOGY Joni Morgan DPM 4/12/2021 0747        Implants:  * No implants in log *      Drains:   Negative Pressure Wound Therapy Foot Anterior; Left (Active)   Wound Type Diabetic foot ulcer 04/12/21 0945   Target Pressure (mmHg) 125 04/12/21 0945   Irrigation Solution Dakins 0.125% 04/11/21 1404   Canister changed? Yes 04/12/21 0749   Dressing Status Clean;Dry; Intact 04/12/21 0945   Dressing Changed Changed/New 04/12/21 0916   Drainage Amount None 04/12/21 0916   Output (ml) 50 ml 04/12/21 0524   Wound Assessment Other (Comment) 04/12/21 0916   Jody-wound Assessment Other (Comment) 04/12/21 0916       External Urinary Catheter (Active)   Catheter changed  Yes 04/11/21 1643   Urine Color Yellow 04/11/21 1834   Urine Appearance Clear 04/11/21 1643   Urine Odor Other (Comment) 04/11/21 1643   Output (mL) 200 mL 04/12/21 0524   Suction- Female Only 40 mmgHg continuous 04/11/21 1643   Placement Replaced 04/11/21 1643   Skin Assessment No Injury 04/11/21 1643       Findings: No purulent drainage encountered. Proximal 5th metatarsal noted to be crumbly, soft, discolored. INDICATIONS FOR PROCEDURE: This patient has signs and symptoms clinically  consistent with the above mentioned preoperative diagnosis. This is the second procedure and the staged surgical intervention. All potential risks, benefits, and complications were discussed with the patient prior to the scheduling of surgery. All the patient's questions were answered and no guarantees were given. The patient wished to proceed with surgery, and informed written consent was obtained. DETAILS OF PROCEDURE: The patient was brought from the pre-operative area and placed on the operating table in the supine position. A pneumatic ankle tourniquet was placed around the patient's well-padded left lower extremity. Following IV sedation, a local anesthetic block was then injected proximal to the incision site consisting of 10 cc of Polocaine plain. The left lower extremity was then scrubbed, prepped, and draped in the usual sterile fashion. A time-out was performed. The patient, procedure, and operative site were confirmed. The following procedure was then performed. PROCEDURE #1: 5TH DIGIT AMPUTATION; LEFT FOOT:  Attention was then directed to the fifth digit on the left foot. A towel clamp was then clamped around the distal aspect of the fifth digit and used to stabilize the digit. A #15 surgical blade was used to make a full thickness incision through the skin. The incision was then deepened through the subcutaneous tissue to the level of bone. The fifth metatarsal was previously resected. Next the base of the proximal phalanx was identified, and the surrounding soft tissue attachments were released using a #15 blade. The digit was then removed distally and passed from the operative field and placed on the back table to be sent a specimen to pathology.     PROCEDURE #2,3: INCISION AND DRAINAGE, EXCISIONALLY WOUND DEBRIDEMENT OF MUSCLE/FASCIA 11cm x 4cm: Attention was then directed to full-thickness ulceration noted at the lateral aspect of the left foot. Using a curette, the necrotic and nonviable soft tissue was excisionally debrided down to the level of bleeding healthy tissue. Next, the redundant flexor tendon was identified and resected at the level of the proximal portion of the wound. Next, attention was directed to the dorsal distal aspect of the wound where there was a significant amount of hypertrophic granulation tissue. This was debrided sharply using #15 blade and removed from the surgical site. At this time, blunt dissection was carried down the plantar, dorsal, proximal portion of the wound where there was no purulent drainage encountered. Attention was then directed toward irrigation. The surgical wound was irrigated with copious amounts of normal sterile saline via pulse lavage. Following irrigation, a wound culture swab was obtained and passed from the operative field to be sent as culture and sensitivity to microbiology. Upon completion of this, hemostasis was achieved using electrocauterization and 3-0 Vicryl. Attention was then directed toward closure. The distal portion of the surgical wound at the site of the fifth digit amputation skin edges were reapproximated using 4-0 nylon in a simple interrupted and horizontal mattress suture fashion. The remaining portion of the surgical wound was unable to be reapproximated and the decision was made to apply a wound VAC to assist in postoperative wound healing. The wound VAC was applied in standard fashion and set to continuous at 125 mmHg. A soft sterile dressing was then applied using gauze, cast padding, Ace bandage. Of note, the pneumatic ankle tourniquet remained deflated throughout the entirety of the procedure. A prompt hyperemic response was noted to all aspects of the patient's left lower extremity.     END OF PROCEDURE: The patient tolerated the

## 2021-04-12 NOTE — PROGRESS NOTES
PACU Transfer Note    Vitals:    04/12/21 0915   BP: 135/60   Pulse: 66   Resp: 15   Temp: 97 °F (36.1 °C)   SpO2: 97%       In: 200 [I.V.:200]  Out: 800 [Urine:800]    Pain assessment:  none  Pain Level: 0    Report given to Receiving unit RN.    4/12/2021 9:18 AM

## 2021-04-12 NOTE — PROGRESS NOTES
Physical Therapy and Occupational Therapy  No Treatment    Patient currently off floor for I&D and 5th digit amputation. Will follow up later today as time and schedule allow. If not, will follow up per PT/OT plans of care.     Luly Wilson, OTR/L, [id number]

## 2021-04-12 NOTE — PROGRESS NOTES
Pt admitted to PACU 10 from OR post LEFT LOWER EXTREMITY: INCISION AND DRAINAGE. EXCISIONAL DEBRIDEMENT OF MUSCLE AND FASCIA [id number] WOUND; LEFT FIFTH TOE AMPUTATION, WOUND VAC APPLICATION per Dr. Katerin Farris. PACU monitoring devices in place. Report received at bedside from CRNA, no intraoperative complications.  Pt denies pain on arrival.

## 2021-04-12 NOTE — PROGRESS NOTES
Hospitalist Progress Note      PCP: Abigail Tran MD    Date of Admission: 4/6/2021    Chief Complaint: Foot Pain     Hospital Course: 77yo female PMH IDDM complicated by neuropathy and chronic foot wounds and comorbid obesity, HTN, HLD, and chronic venous stasis. Admitted for diabetic foot ulcer with infection and underlying osteomyelitis. S/P debridement and resection of nonviable bone on 4/10 culture polymicrobial. Pt tolerated the procedure well. However the morning after the procedure had significant anemia with Hb 6.6 from 8.6. Was ordered 2U PRBCs to be transfused. S/P LEFT LOWER EXTREMITY: INCISION AND DRAINAGE. EXCISIONAL DEBRIDEMENT OF MUSCLE AND FASCIA [id number] WOUND; LEFT FIFTH TOE AMPUTATION, WOUND VAC APPLICATION on 5/58    Subjective:   Seen after OR today. Denies any sore throat, CP, SOB. No acute event reported overnight.            Medications:  Reviewed    Infusion Medications    sodium chloride      dextrose      sodium chloride       Scheduled Medications    [START ON 4/13/2021] enoxaparin  40 mg Subcutaneous Daily    magnesium citrate  296 mL Oral Once    guaiFENesin  600 mg Oral BID    insulin NPH  35 Units Subcutaneous BID AC    cetirizine  10 mg Oral Daily    insulin lispro  10 Units Subcutaneous TID WC    sodium chloride flush  5-40 mL Intravenous 2 times per day    piperacillin-tazobactam  3,375 mg Intravenous Q8H    insulin lispro  0-12 Units Subcutaneous TID WC    insulin lispro  0-6 Units Subcutaneous Nightly    labetalol  100 mg Oral BID    atorvastatin  20 mg Oral Nightly     PRN Meds: HYDROmorphone, HYDROmorphone, fentanNYL, fentanNYL, ondansetron, labetalol, hydrALAZINE, sodium chloride, glucose, dextrose, glucagon (rDNA), dextrose, sodium chloride flush, sodium chloride, promethazine **OR** ondansetron, polyethylene glycol, acetaminophen **OR** acetaminophen, oxyCODONE **OR** oxyCODONE      Intake/Output Summary (Last 24 hours) at 4/12/2021 1233  Last data filed at 4/12/2021 0914  Gross per 24 hour   Intake 1090 ml   Output 2500 ml   Net -1410 ml       Physical Exam Performed:    /72   Pulse 64   Temp 97.7 °F (36.5 °C) (Oral)   Resp 18   Ht 5' 4\" (1.626 m)   Wt 256 lb (116.1 kg)   LMP  (LMP Unknown)   SpO2 98%   BMI 43.94 kg/m²     General appearance: NAD  HEENT: Pupils equal, round, and reactive to light. Conjunctivae/corneas clear. Neck: Supple, with full range of motion. No jugular venous distention. Trachea midline. Respiratory:  Normal respiratory effort. Clear to auscultation, bilaterally without Rales/Wheezes/Rhonchi. Cardiovascular: Regular rate and rhythm with normal S1/S2 without murmurs, rubs or gallops. Abdomen: Soft, non-tender, non-distended with normal bowel sounds. Musculoskeletal: bilateral lower extremity wrapped, wound vac present left foot  Skin: Skin color, texture, turgor normal.  No rashes or lesions. Neurologic:  AOx3, no focal neuro deficit noted. Psychiatric: Alert and oriented, thought content appropriate, normal insight  Capillary Refill: Brisk,< 3 seconds   Peripheral Pulses: +2 palpable, equal bilaterally       Labs:   Recent Labs     04/10/21  0712 04/11/21  0752 04/12/21  0440   WBC 7.3 6.4 6.2   HGB 8.2* 8.4* 8.4*   HCT 25.6* 26.8* 26.6*    430 407     Recent Labs     04/10/21  0711 04/11/21  0752 04/12/21  0440    141 141   K 4.1 4.2 4.2    103 105   CO2 28 29 27   BUN 16 15 20   CREATININE 1.1 1.1 1.1   CALCIUM 8.3 8.4 8.6     No results for input(s): AST, ALT, BILIDIR, BILITOT, ALKPHOS in the last 72 hours. Recent Labs     04/11/21  0925   INR 1.17*     No results for input(s): [place] in the last 72 hours.     Urinalysis:      Lab Results   Component Value Date    NITRU Negative 11/30/2019    WBCUA 10-20 11/30/2019    BACTERIA 2+ 11/30/2019    RBCUA 3-5 11/30/2019    BLOODU TRACE-INTACT 11/30/2019    SPECGRAV 1.020 11/30/2019    GLUCOSEU 100 11/30/2019       Radiology:  XR FOOT LEFT (MIN 3 VIEWS)   Final Result   1. Status post left small toe amputation. XR SHOULDER RIGHT (MIN 2 VIEWS)   Final Result      1. No findings for acute traumatic bony abnormality. 2.  Severe arthritic changes in the right shoulder as described. MRI FOOT LEFT WO CONTRAST   Final Result      1. Status post partial 5th ray amputation. The remaining toe again demonstrates evidence of osteomyelitis in the proximal phalanx. Bony defect in the base of the proximal phalanx from operative debridement and/or infection. 2.  Multifocal mild marrow signal alteration as described in the mid foot, which is probably related to presence of the TMT degenerative arthrosis and/or regional hyperemia, but early osteomyelitis is not excluded. 3.  Postoperative changes in the soft tissues. No drainable fluid collections identified. MRI ANKLE LEFT WO CONTRAST   Final Result      1. Status post partial 5th ray amputation. The remaining toe again demonstrates evidence of osteomyelitis in the proximal phalanx. Bony defect in the base of the proximal phalanx from operative debridement and/or infection. 2.  Multifocal mild marrow signal alteration as described in the mid foot, which is probably related to presence of the TMT degenerative arthrosis and/or regional hyperemia, but early osteomyelitis is not excluded. 3.  Postoperative changes in the soft tissues. No drainable fluid collections identified. VL DUP LOWER EXTREMITY ARTERIES BILATERAL   Final Result      XR FOOT LEFT (MIN 3 VIEWS)   Final Result      Status post resection of the fifth metatarsal.      Abnormal lucency in the base of the proximal phalanx of the fifth toe consistent with osteomyelitis. Correlate clinically. XR CHEST PORTABLE   Final Result     No acute findings in the chest.          XR FOOT LEFT (MIN 3 VIEWS)   Final Result   1. Lucencies in 5th metatarsal and 5th proximal phalanx, concerning for    osteomyelitis. Cannot exclude superimposed fracture. 2.  Soft tissue swelling. Lateral foot soft tissue air. Assessment/Plan:    Active Hospital Problems    Diagnosis Date Noted    [facility] [D04.814, L08.9] 04/08/2021    Group B streptococcal infection [A49.1] 04/08/2021    Chronic osteomyelitis of left foot with draining sinus ([facility] [M86.472] 04/08/2021    Osteomyelitis ([facility] [M86.9] 04/07/2021     Infected Diabetic Foot Ulcer with suspected Osteomyelitis  - S/P amputation of left 5th ray with Podiatry on 4/10  S/P I&D, excisional debridement, left fifth toe amputation with wound vac placement on 4/12  - Zosyn pending further adjustment per ID. Discuss with Dr Dirk Radford will follow up on culture before deciding about future ABx recs. Will need long term abx and potentially PICC line. - dressing and wound care per Podiatry  - Roxicodone for pain control    Upper airway congestion, likely due to seasonal allergies: Improved  - chest clear without wheeze or rhonci  - Cetirizine 10mg daily  - Add mucinex    Insulin Dependant Diabetes Mellitus  - BG trend reviewed, continue NPH to 35 units bid  - Lispro 10U TID AC, medium Dose SSI AC HS  - POC Glucose AC HS   - hypoglycemia protocol  - HbA1c 8.5%      Hx of 8mm lung base nodule  - outpatient fu for further imaging    Morbid Obesity: Body mass index is 43.94 kg/m². Olimpia Decent Complicating assessment and treatment. Placing patient at risk for multiple co-morbidities as well as early death and contributing to the patient's presentation. Counseled on diet, weight loss    Other Chronic Issues  HTN- labetalol 100mg BID  HLD - lipitor 20mg daily       DVT Prophylaxis: Lovenox  Diet: DIET CARB CONTROL; Carb Control: 4 carb choices (60 gms)/meal  Code Status: Full Code    PT/OT Eval Status: pending    Dispo -Continue inpatient care, Follow up on OR culture.  Likely d/c in 2-3 days will need placement    [facility]  Attending Physician

## 2021-04-12 NOTE — CARE COORDINATION
Case Management Assessment           Daily Note                 Date/ Time of Note: 4/12/2021 11:49 AM         Note completed by: Sanaz Wolf    Patient Name: Sarah Ball  YOB: 1945    Diagnosis:Osteomyelitis [facility]) [M86.9]  Patient Admission Status: Inpatient    Date of Admission:4/6/2021 11:26 PM Length of Stay: 5 GLOS:      Current Plan of Care: Pt from PACU s/p I& D of wound on left foot and wound VAC placement. MD stated there needs to be a discussion with ID regarding IV ABX. Referrals have been sent for [employer] Pre-cert to multiple facilities in the [address]. Waiting for updates. Met with pt at the bedside today. She is sleepy from anesthesia. APS involved with pt due to home living conditions. ________________________________________________________________________________________  PT AM-PAC: 12 / 24 per last evaluation on: 4/9    OT AM-PAC: 13 / 24 per last evaluation on: 4/9    DME Needs for discharge: TBD  ________________________________________________________________________________________  Discharge Plan: SNF: TBD    Tentative discharge date: 1-2 days    Current barriers to discharge: pre-cert    Referrals completed: Not Applicable    Resources/ information provided: SNF list discussed in brief  ________________________________________________________________________________________      Annamarie Zuleta and her family were provided with choice of provider; she and her family are in agreement with the discharge plan.     Care Transition Patient: Christi Wolf, MSN RN-[facility]  Case Management Department  [phone]

## 2021-04-12 NOTE — BRIEF OP NOTE
Brief Postoperative Note      Patient: Addy Sr  YOB: 1945  MRN: [id number]    Date of Procedure: 4/12/2021    Pre-Op Diagnosis: DIABETIC FOOT ULCERATION, OSTEOMYELITIS; LEFT LOWER EXTTREMITY    Post-Op Diagnosis: Same     Procedure(s):  LEFT LOWER EXTREMITY: INCISION AND DRAINAGE. EXCISIONAL DEBRIDEMENT OF MUSCLE AND FASCIA [id number] WOUND; LEFT FIFTH TOE AMPUTATION, WOUND VAC APPLICATION       Surgeon(s):  Preston Infante DPM    Assistant:  Resident: Layla Ramos DPM    Anesthesia: General    Hemostasis: Electrocautery    Injectables: 10cc of 2% Polocaine plain    Materials: 3-0 vicryl, 4-0 nylon  Wound vac foam, tegaderm, wound vac    Estimated Blood Loss (mL): 99IG    Complications: None    Specimens:   ID Type Source Tests Collected by Time Destination   1 : DEEP SPACE ABSCESS LEFT FOOT Tissue Tissue CULTURE, FUNGUS, CULTURE, TISSUE, CULTURE WITH SMEAR, ACID FAST BACILLIUS Preston Infante DPM 4/12/2021 0747    A : LEFT FIFTH DIGIT Tissue Tissue SURGICAL PATHOLOGY Preston Infante DPM 4/12/2021 0747        Implants:  * No implants in log *      Drains:   Negative Pressure Wound Therapy Foot Anterior; Left (Active)   Wound Type Diabetic foot ulcer 04/12/21 0350   Target Pressure (mmHg) 125 04/12/21 0350   Irrigation Solution Dakins 0.125% 04/11/21 1404   Canister changed? Yes 04/12/21 0749   Dressing Status Clean;Dry; Intact 04/12/21 0350   Dressing Changed Changed/New 04/12/21 0749   Output (ml) 50 ml 04/12/21 0524       External Urinary Catheter (Active)   Catheter changed  Yes 04/11/21 1643   Urine Color Yellow 04/11/21 1834   Urine Appearance Clear 04/11/21 1643   Urine Odor Other (Comment) 04/11/21 1643   Output (mL) 200 mL 04/12/21 0524   Suction- Female Only 40 mmgHg continuous 04/11/21 1643   Placement Replaced 04/11/21 1643   Skin Assessment No Injury 04/11/21 1643       Findings: No purulent drainage encountered. Proximal 5th metatarsal noted to be crumbly, soft, discolored.      DISPO: s/p

## 2021-04-12 NOTE — PROGRESS NOTES
[id number]: Patient transported back to room 3313 from PACU via hospital bed. Introduced self to patient as Primary RN providing care, patient verbalized understanding. Pt repositioned for comfort, white board updated. Hema reconnected per pt's request.  Assessment as charted, please see flowsheet. Scheduled medications administered, please see MAR. Ice water provided with medication per request. Primary RN assisted patient with ordering breakfast. Pt denies further needs at this time, call light placed in reach, will monitor.

## 2021-04-12 NOTE — ANESTHESIA PRE PROCEDURE
Department of Anesthesiology  Preprocedure Note       Name:  Karen Rubin   Age:  76 y.o.  :  1945                                          MRN:  [id number]         Date:  2021      Surgeon: Hi Knight):  Judy Camacho DPM    Procedure: Procedure(s):  LEFT LOWER EXTREMITY INCISION AND DRAINAGE, WOUND DEBRIDEMENT AND WOUND VAC APPLICATION AND LEFT FIFTH TOE AMPUTATION    Medications prior to admission:   Prior to Admission medications    Medication Sig Start Date End Date Taking? Authorizing Provider   simvastatin (ZOCOR) 20 MG tablet TAKE ONE TABLET BY MOUTH ONCE NIGHTLY 3/5/21  Yes Carlos Ballesteros DO   labetalol (NORMODYNE) 100 MG tablet TAKE ONE TABLET BY MOUTH TWICE A DAY 3/5/21  Yes Carlos Ballesteros DO   insulin NPH (HUMULIN N) 100 UNIT/ML injection vial INJECT 45 UNITS UNDER THE SKIN EVERY MORNING AND INJECT 25 UNITS UNDER THE SKIN EVERY NIGHT AT BEDTIME 21   Carlos Ballesteros,    ONETOUCH ULTRA strip USE ONE STRIP TO TEST TWICE A DAY AS NEEDED 3/8/21   Carlos Ballesteros DO   [facility] WOUND DRESSING MATRIX Apply topically Trim product to match the size of the wound. Apply topically. Moisten lightly with saline. Cover with dry sterile dressing. 20   Silvano Bran DPM   SODIUM CHLORIDE, EXTERNAL, (SALINE WOUND [address]) 0.9 % SOLN Moisten Promogran wound dressing with saline with each application.  20   Silvano Bran DPM   clindamycin (CLEOCIN) 300 MG capsule TAKE ONE CAPSULE BY MOUTH THREE TIMES A DAY 10/31/20   Silvano Bran DPM   vitamin B-12 (CYANOCOBALAMIN) 100 MCG tablet Take 50 mcg by mouth daily    Historical Provider, MD   Multiple Vitamin (MULTIVITAMIN PO) Take by mouth daily    Historical Provider, MD   acetaminophen (TYLENOL) 500 MG tablet Take 500 mg by mouth every 6 hours as needed for Pain    Historical Provider, MD   insulin regular (HUMULIN R) 100 UNIT/ML injection INJECT 45 UNITS INTO THE ARM UNDER THE SKIN AT LUNCH AND THEN INJECT 25 UNITS UNDER THE SKIN AT BEDTIME  Patient taking differently: Sliding scale 5/26/20   Mikael Morocho MD   Handicap Placard MISC by Does not apply route Duration 5 years 1/22/20   Mikael Morocho MD   Insulin Syringe-Needle U-100 (BD INSULIN SYRINGE U/F) 31G X 5/16\" 0.5 ML MISC USE ONE SYRINGE FOUR TIMES A DAY 10/22/19   Mikael Morocho MD   ONETOUCH DELICA LANCETS 44J MISC USE TO TEST BLOOD SUGAR THREE TIMES A DAY 10/22/19   Mikael Morocho MD   Blood Pressure KIT Apply 1 applicator topically 2 times daily 8/22/19   Daylin Alegria MD   blood glucose monitor strips Test 3-4 times a day & as needed for symptoms of irregular blood glucose. DX: E11.9. Supply the strips that go with her meter and approved by insurance 6/21/19   Daylin Alegria MD   Lancets MISC 1 each by Does not apply route daily Test 3-4 times a day & as needed for symptoms of irregular blood glucose. DX: E11.9. Supply the lancets that go with her meter and approved by insurance 6/21/19   Daylin Alegria MD   Continuous Blood Gluc Sensor (FREESTYLE VÍCTOR 14 DAY SENSOR) [facility] CHECK GLUCOSE 4 TIMES DAILY 12/24/18   Karthik Harrison MD   B-D ULTRAFINE III SHORT PEN 31G X 8 MM MISC USE WITH INSULIN TWO TIMES A DAY AS DIRECTED 8/13/18   Karthik Harrison MD   glucose monitoring kit (FREESTYLE) monitoring kit Use a ONE TOUCH 3/27/18   Karthik Harrison MD   EPINEPHrine (EPIPEN 2-OSMAR) 0.3 MG/0.3ML SOAJ injection Inject 0.3 mLs into the muscle once for 1 dose. Use as directed for allergic reaction  Patient not taking: Reported on 1/22/2020 7/21/14 12/9/19  Karthik Harrison MD   sitagliptan (JANUVIA) 100 MG tablet Take 1 tablet by mouth daily for 360 days.  2/18/11 5/18/11  Karthik Harrison MD       Current medications:    Current Facility-Administered Medications   Medication Dose Route Frequency Provider Last Rate Last Admin    guaiFENesin [facility]) extended release tablet 600 mg  600 mg Oral BID Joon Vogel MD   600 mg at 04/11/21 2056    insulin NPH (HUMULIN N;NOVOLIN N) injection pen 35 Units  35 Units Subcutaneous BID [facility] MD Juan   35 Units at 04/11/21 1630    cetirizine (ZYRTEC) tablet 10 mg  10 mg Oral Daily [place] Shine, DO   10 mg at 04/11/21 0942    0.9 % sodium chloride infusion   Intravenous PRN Zack Arvizu, DPM        insulin lispro (1 Unit Dial) 10 Units  10 Units Subcutaneous TID  [place] Shine, DO   10 Units at 04/11/21 1832    glucose (GLUTOSE) 40 % oral gel 15 g  15 g Oral PRN [place] Shine, DO        dextrose 50 % IV solution  12.5 g Intravenous PRN [place] Shine, DO        glucagon (rDNA) injection 1 mg  1 mg Intramuscular PRN [place] Shine, DO        dextrose 5 % solution  100 mL/hr Intravenous PRN [place] Shine, DO        sodium chloride flush 0.9 % injection 5-40 mL  5-40 mL Intravenous 2 times per day Librado Kan DPM   10 mL at 04/11/21 2056    sodium chloride flush 0.9 % injection 5-40 mL  5-40 mL Intravenous PRN SCOTT WilkinsM   10 mL at 04/09/21 2334    0.9 % sodium chloride infusion  25 mL Intravenous PRN Librado Kan DPM        promethazine (PHENERGAN) tablet 12.5 mg  12.5 mg Oral Q6H PRN Librado Kan DPM        Or    ondansetron (ZOFRAN) injection 4 mg  4 mg Intravenous Q6H PRN Librado Kan DPM        polyethylene glycol (GLYCOLAX) packet 17 g  17 g Oral Daily PRN Librado Kan DPM   17 g at 04/11/21 1047    acetaminophen (TYLENOL) tablet 650 mg  650 mg Oral Q6H PRN SCOTT WilkinsM   650 mg at 04/10/21 0804    Or    acetaminophen (TYLENOL) suppository 650 mg  650 mg Rectal Q6H PRN Librado Kan DPM        piperacillin-tazobactam (ZOSYN) 3,375 mg in dextrose 5 % 100 mL IVPB extended infusion (mini-bag)  3,375 mg Intravenous Q8H Librado Kan DPM   Stopped at 04/12/21 0617    insulin lispro (1 Unit Dial) 0-12 Units  0-12 Units Subcutaneous TID  Librado Kan DPM   2 Units at 04/11/21 1832    insulin lispro (1 Unit Dial) 0-6 Units  0-6 Units Subcutaneous Nightly Librado Kan DPM   2 with draining sinus (HCC) M86.472       Past Medical History:        Diagnosis Date    Acute respiratory failure with hypoxia ([address] 2019    Hx of blood clots 2019    PE    Hyperlipidemia     Hypertension     Mallet finger 3/4/2015    PONV (postoperative nausea and vomiting)     Type II or unspecified type diabetes mellitus without mention of complication, not stated as uncontrolled        Past Surgical History:        Procedure Laterality Date    BREAST BIOPSY Right     FOOT DEBRIDEMENT Left 2021    LEFT LOWER EXTREMITY FOOT DEBRIDEMENT INCISION AND DRAINAGE WITH  5TH METATARSAL RESECTION performed by Lori Mcleod DPM at [facility] Right 10/30/2020    PARTIAL AMPUTATION 5TH RAY RIGHT FOOT performed by Pascual Pedroza DPM at [facility] History:    Social History     Tobacco Use    Smoking status: Former Smoker     Packs/day: 0.25     Years: 38.00     Pack years: 9.50     Types: Cigarettes     Start date: 10/22/1958     Quit date: 1980     Years since quittin.3    Smokeless tobacco: Never Used   Substance Use Topics    Alcohol use:  Yes     Alcohol/week: 1.0 standard drinks     Types: 1 Cans of beer per week     Comment: occasional                                 Counseling given: Not Answered      Vital Signs (Current):   Vitals:    21 1302 21 1646 21 2000 21 0350   BP: (!) 143/68 (!) 150/72 (!) 140/55 (!) 146/63   Pulse: 75 75 78 72   Resp: 18 18 16 16   Temp: 97.5 °F (36.4 °C) 98.2 °F (36.8 °C) 98.1 °F (36.7 °C) 98 °F (36.7 °C)   TempSrc: Oral Oral Oral Oral   SpO2: 92% 92% 95%    Weight:       Height:                                                  BP Readings from Last 3 Encounters:   21 (!) 146/63   21 136/64   10/30/20 (!) 143/67       NPO Status: Time of last liquid consumption: 1029(sip to swollow pills)                        Time of last solid consumption: 0900                        Date of last liquid consumption: 04/07/21                        Date of last solid food consumption: 04/06/21    BMI:   Wt Readings from Last 3 Encounters:   04/07/21 256 lb (116.1 kg)   12/29/20 257 lb (116.6 kg)   12/22/20 157 lb (71.2 kg)     Body mass index is 43.94 kg/m². CBC:   Lab Results   Component Value Date    WBC 6.2 04/12/2021    RBC 3.26 04/12/2021    HGB 8.4 04/12/2021    HCT 26.6 04/12/2021    MCV 81.4 04/12/2021    RDW 19.2 04/12/2021     04/12/2021       CMP:   Lab Results   Component Value Date     04/12/2021    K 4.2 04/12/2021    K 5.1 04/08/2021     04/12/2021    CO2 27 04/12/2021    BUN 20 04/12/2021    CREATININE 1.1 04/12/2021    GFRAA 58 04/12/2021    GFRAA >60 04/11/2013    AGRATIO 0.7 10/28/2020    LABGLOM 48 04/12/2021    GLUCOSE 68 04/12/2021    PROT 6.7 10/28/2020    CALCIUM 8.6 04/12/2021    BILITOT <0.2 10/28/2020    ALKPHOS 63 10/28/2020    AST 11 10/28/2020    ALT 9 10/28/2020       POC Tests:   Recent Labs     04/11/21 2050   POCGLU 124*       Coags:   Lab Results   Component Value Date    PROTIME 13.6 04/11/2021    INR 1.17 04/11/2021    APTT 29.0 10/28/2020       HCG (If Applicable): No results found for: PREGTESTUR, PREGSERUM, HCG, HCGQUANT     ABGs: No results found for: PHART, PO2ART, UZK6CZO, KYU0TRD, BEART, Y3GOCRJB     Type & Screen (If Applicable):  No results found for: LABABO, LABRH    Drug/Infectious Status (If Applicable):  No results found for: HIV, HEPCAB    COVID-19 Screening (If Applicable):   Lab Results   Component Value Date    COVID19 Not Detected 10/26/2020           Anesthesia Evaluation  Patient summary reviewed   history of anesthetic complications: PONV. Airway: Mallampati: III  TM distance: >3 FB   Neck ROM: full  Mouth opening: > = 3 FB Dental: normal exam         Pulmonary:                             ROS comment: Hx PE   Cardiovascular:    (+) hypertension:,                ROS comment:  Summary   Normal left ventricle size.  There is left ventricular hypertrophy. Overall   left ventricular systolic function appears normal with an ejection fraction   estimated at 55-60%. There is septal flattening consistent with right   ventricular overload. Indeterminate diastolic function. Moderate tricuspid regurgitation. Estimated pulmonary artery systolic pressure is elevated at 65 mmHg assuming   a right atrial pressure of 8 mmHg. Biatrial enlargement. The right ventricle is enlarged. Signature      ------------------------------------------------------------------   Electronically signed by Italo Tan MD   (Interpreting physician) on 12/03/2019 at 02:35 PM   ------------------------------------------------------------------     Neuro/Psych:               GI/Hepatic/Renal:   (+) morbid obesity (BMI  44)          Endo/Other:    (+) Diabetes, . ROS comment: Osteomyelitis    Chronic osteomyelitis of left foot with draining sinus Abdominal:           Vascular:                                        Anesthesia Plan      general     ASA 3       Induction: intravenous. Anesthetic plan and risks discussed with patient.                       Myriam Branch MD   4/12/2021

## 2021-04-12 NOTE — PROGRESS NOTES
Perfect Serve message sent to Dr. Barkley [place]:   4/12/21 9:48 AM   [phone] Hospital or Facility: [facility] From: Camila Bojorquez RE: May Loving 1945 RM: [id number] LZHIR. Patient back to room from surgery, requesting to eat breakfast. Do you mind changing diet in system from NPO? Thank you!  Need Callback: NO CALLBACK REQ 3SOUTH  Read 9:48 AM

## 2021-04-12 NOTE — DISCHARGE INSTR - COC
Continuity of Care Form    Patient Name: David Abraham   :    MRN:  [id number]    Admit date:  2021  Discharge date:  21    Code Status Order: Full Code   Advance Directives:   Advance Care Flowsheet Documentation       Date/Time Healthcare Directive Type of Healthcare Directive Copy in [address] Agent's Name Healthcare Agent's Phone Number    21 6809  No, patient does not have an advance directive for healthcare treatment -- -- -- -- --    21 1354  No, patient does not have an advance directive for healthcare treatment -- -- -- -- --            Admitting Physician:  Tova Luis MD  PCP: Susan Montero MD    Discharging Nurse: [address] [place] [address] Unit/Room#: 4341/3376-48  Discharging Unit Phone Number: [phone]    Emergency Contact:   Extended Emergency Contact Information  Primary Emergency Contact: Dima Isabel, [address] Phone: [phone]  Relation: Other    Past Surgical History:  Past Surgical History:   Procedure Laterality Date    BREAST BIOPSY Right     FOOT DEBRIDEMENT Left 2021    LEFT LOWER EXTREMITY FOOT DEBRIDEMENT INCISION AND DRAINAGE WITH  5TH METATARSAL RESECTION performed by Oumou Rodriguez DPM at [address] Left 2021    LEFT LOWER EXTREMITY EXCISIONAL [address] 11X4 CM WOUND; LEFT FIFTH TOE AMPUTATION, WOUND VAC APPLICAITON performed by Joni Morgan DPM at [facility] Left 2021    LEFT LOWER EXTREMITY: INCISION AND DRAINAGE.  EXCISIONAL DEBRIDEMENT OF MUSCLE AND FASCIA [id number] WOUND; LEFT FIFTH TOE AMPUTATION, WOUND VAC APPLICATION    TOE AMPUTATION Right 10/30/2020    PARTIAL AMPUTATION 5TH RAY RIGHT FOOT performed by Cailin Biggs DPM at [facility]       Immunization History:   Immunization History   Administered Date(s) Administered    Influenza 10/15/2010, 11/15/2011    Influenza Vaccine, unspecified formulation 11/15/2011, 01/10/2013, 11/10/2014, 09/14/2015    Influenza, High Dose (Fluzone 65 yrs and older) 01/10/2013, 11/10/2014, 09/14/2015, 11/21/2016, 12/27/2017, 12/24/2018    Influenza, Triv, inactivated, subunit, adjuvanted, IM (Fluad 65 yrs and older) 10/22/2019, 10/28/2020    Pneumococcal Conjugate 13-valent ([id number]) 03/15/2016    Pneumococcal Polysaccharide (Gckqpihyh05) 10/15/2010    Tdap (Boostrix, Adacel) 05/18/2011       Active Problems:  Patient Active Problem List   Diagnosis Code    Hyperlipidemia E78.5    HTN (hypertension) I10    Uncontrolled type 2 diabetes mellitus with complication, with long-term current use of insulin ([facility]) E11.8, E11.65, Z79.4    Angioedema T78. 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 ([facility]) I83.212, L97.219    Class 3 severe obesity due to excess calories without serious comorbidity with body mass index (BMI) of 45.0 to 49.9 in adult ([facility]) E66.01, Z68.42    Hypoxia R09.02    Acute pulmonary embolism ([facility]) I26.99    Dyspnea on exertion R06.00    Calcification of gallbladder K82.8    Osteomyelitis ([facility]) M86.9    Diabetic foot infection ([facility]) E11.628, L08.9    Group B streptococcal infection A49.1    Chronic osteomyelitis of left foot with draining sinus ([facility]) M86.472       Isolation/Infection:   Isolation            No Isolation          Patient Infection Status       None to display            Nurse Assessment:  Last Vital Signs: /72   Pulse 64   Temp 97.7 °F (36.5 °C) (Oral)   Resp 18   Ht 5' 4\" (1.626 m)   Wt 256 lb (116.1 kg)   LMP  (LMP Unknown)   SpO2 98%   BMI 43.94 kg/m²     Last documented pain score (0-10 scale): Pain Level: 9  Last Weight:   Wt Readings from Last 1 Encounters:   04/07/21 256 lb (116.1 kg)     Mental Status:  oriented    IV Access:  Left picc line placed     Nursing Mobility/ADLs:  Walking   Dependent  Transfer  Dependent  Bathing  Dependent  Dressing  Dependent  Toileting Dependent  Feeding  Assisted  Med Admin  Assisted  Med Delivery   whole    Wound Care Documentation and Therapy:  Negative Pressure Wound Therapy Foot Anterior; Left (Active)   Wound Type Diabetic foot ulcer 04/12/21 0945   Target Pressure (mmHg) 125 04/12/21 0945   Irrigation Solution Dakins 0.125% 04/11/21 1404   Canister changed? Yes 04/12/21 0749   Dressing Status Clean;Dry; Intact 04/12/21 0945   Dressing Changed Changed/New 04/12/21 0916   Drainage Amount None 04/12/21 0916   Output (ml) 50 ml 04/12/21 0524   Wound Assessment Other (Comment) 04/12/21 0916   Jody-wound Assessment Other (Comment) 04/12/21 0916   Number of days: 2        Elimination:  Continence:   · Bowel: No  · Bladder: No  Urinary Catheter: None   Colostomy/Ileostomy/Ileal Conduit: No       Date of Last BM: 4/14/21    Intake/Output Summary (Last 24 hours) at 4/12/2021 1522  Last data filed at 4/12/2021 0914  Gross per 24 hour   Intake 850 ml   Output 2500 ml   Net -1650 ml     I/O last 3 completed shifts: In: 850 [P.O.:120; I.V.:730]  Out: 2500 [Urine:2100; Drains:400]    Safety Concerns:     History of Falls (last 30 days)    Impairments/Disabilities:      None    Nutrition Therapy:  Current Nutrition Therapy:   - Oral Diet:  Carb Control 4 carbs/meal (1800kcals/day)    Routes of Feeding: Oral  Liquids: Thin Liquids  Daily Fluid Restriction: no  Last Modified Barium Swallow with Video (Video Swallowing Test): not done    Treatments at the Time of Hospital Discharge:   Respiratory Treatments:   Oxygen Therapy:  is not on home oxygen therapy.   Ventilator:    - No ventilator support    Rehab Therapies: Physical Therapy and Occupational Therapy  Weight Bearing Status/Restrictions: nonweight bearing on left   Other Medical Equipment (for information only, NOT a DME order):  stedy  Other Treatments:     Patient's personal belongings (please select all that are sent with patient):      RN SIGNATURE:  Electronically signed by Mark Burgos RN on 4/14/21 at 3:54 PM EDT    CASE MANAGEMENT/SOCIAL WORK SECTION    Inpatient Status Date: 4/7/2021    Readmission Risk Assessment Score:  Readmission Risk              Risk of Unplanned Readmission:        17           Discharging to Facility/ Agency   [facility]  [address], [place], [address]  Phone: [phone]  Fax: [phone]    / signature: Electronically signed by SHALA Nieves on 4/14/21 at 4:14 PM EDT    PHYSICIAN SECTION    Prognosis: Fair    Condition at Discharge: Stable    Rehab Potential (if transferring to Rehab):  Fair    Recommended Labs or Other Treatments After Discharge:   INFUSION ORDERS:  Ceftriaxone 2 gm iv daily through 5/19   - Diagnosis - L foot gas gangrene / osteomyelitis  - First dose given in hospital  - PICC   - Disposition / date discharge  - Check CBC w diff, CMP, ESR, CRP every Mon or Tue - FAX result to [phone]  - Call with antibiotic / infusion issues, [phone]  - Call with any change in status, transfer in or out of a facility or to hospital - [phone]  - No f/u in outpatient ID office necessary    PODIATRY DISCHARGE INSTRUCTIONS:  Wound VAC dressing change instructions for LEFT LOWER EXTREMITY:  -Please perform wound VAC dressing change every Monday, Wednesday, Friday  -Using adhesive sheet from wound Vac kit, cut to size that will be placed over the wound and surrounding soft tissue to \"window out\" the wound  -Next, using a scissors cut the adhesive sheet out that is overlying the wound bed  -Next, using the black foam from the wound VAC kit, cut to size the black foam to fit over the wound bed  -Next, using the adhesive sheet, place the black foam over the wound bed and then place the adhesive sheet over the black foam to hold in place  -Next, using a scissors cut a quarter size hole in the adhesive sheet/black foam for the wound VAC suction connector to be placed over  -Next, place the wound VAC suction connector over the cut out area on the black foam  -Next, hook up the connector to the other portion of the wound VAC canister  -Set wound VAC to VAC therapy and running continuously for 125 mmHg   -Ensure that a good seal is noted. If needed re-enforce areas with additional adhesive sheet stripes from the wound VAC kit  -Next, dress the area with gauze over the wound VAC area and place gauze along the top of the foot and ankle  -Please apply quarter sized amount of Lac-Hydrin 12% lotion to the patient's left lower leg. Please use extreme caution to avoid the wound VAC adhesive sheet with Lac-Hydrin.  -Next, using kerlix wrap from the toes up and just above the ankle  -Next, using Ace bandage, wrap from the toes up and just above the ankle with CARE to ensure wound VAC tubing is NOT in direct contact with the skin    RIGHT LOWER EXTREMITY:  Please perform every day dressing changes to right lower extremity as follows  -Please apply quarter sized amount of Lac-Hydrin to patient's right lower leg.  -Apply adaptic over the front of the right lower leg  -Next apply gauze to the top of the right foot, ankle, and leg  -Next loosely wrap the right lower extremity with Kerlix starting from just in front of the toes and ending just below the knee  -Next gently wrap the right foot with 4 inch Ace bandage starting from just in front of the toes and ending just above the ankle  -Next gently wrap the right leg with 6 inch Ace bandage starting from just above the ankle and ending just below the right knee    Patient is NON weightbearing Left lower extremity    Please follow-up with Dr. Davis Miles DPM in the [facility] within 1 week of discharge. Physician Certification: I certify the above information and transfer of Artem Pappas  is necessary for the continuing treatment of the diagnosis listed and that she requires [facility] for less 30 days.      Update Admission H&P: No change in

## 2021-04-12 NOTE — PLAN OF CARE
Problem: Falls - Risk of:  Goal: Will remain free from falls  Description: Will remain free from falls  4/12/2021 0030 by Susana Haji RN  Note: Pt free from injury or falls at this time, fall precautions in place, bed in low position, side rail up x2, Schrader Fall Risk: High (45 and higher), bed alarm on, reoriented to room and call light, reminded not to get up without assistance, call light in reach, will continue to monitor. Pt verbalizes understanding of fall risk procedures.        Problem: Pain:  Goal: Pain level will decrease  Description: Pain level will decrease  4/12/2021 0030 by Susana Haji RN  Note: Right shoulder given for oxycodone, right shoulder supported with pillow

## 2021-04-12 NOTE — PROGRESS NOTES
Dr. Derek Motta response via Perfect Serve:  4/12/21 9:48 AM   Can you change diet back to what was she before     Patient's diet changed back to carb control, 4 carb options.

## 2021-04-12 NOTE — PROGRESS NOTES
ID Follow-up NOTE    CC:   L DFI, gas ganrene  Antibiotics: Zosyn    Admit Date: 4/6/2021  Hospital Day: 7    Subjective:     S/p I&D, L 5th toe amputation, VAC placed this am    Patient reports she is ok, has some 'tingling' of L foot  No other complaint     Objective:     Patient Vitals for the past 8 hrs:   BP Temp Temp src Pulse Resp SpO2   04/12/21 1201 -- -- -- -- -- 98 %   04/12/21 0945 119/72 97.7 °F (36.5 °C) Oral 64 18 98 %   04/12/21 0915 135/60 97 °F (36.1 °C) Temporal 66 15 97 %   04/12/21 0900 138/63 -- -- 74 19 94 %   04/12/21 0850 (!) 146/58 -- -- 72 18 94 %   04/12/21 0845 (!) 139/59 -- -- 73 18 94 %   04/12/21 0840 (!) 149/60 -- -- 76 19 96 %   04/12/21 0837 (!) 144/62 97 °F (36.1 °C) Temporal 75 14 95 %     I/O last 3 completed shifts: In: 36 [P.O.:600; I.V.:530]  Out: 2200 [Urine:1800; Drains:400]  I/O this shift:  In: 200 [I.V.:200]  Out: 800 [Urine:800]    EXAM:  GENERAL: No apparent distress. HEENT: Membranes moist, no oral lesion  NECK:  Supple, no lymphadenopathy  LUNGS: Clear b/l, no rales, no dullness  CARDIAC: RRR, no murmur appreciated  ABD:  + BS, soft / NT  EXT:  B/l foot dressing.   L foot wound with VAC  NEURO: No focal neurologic findings  PSYCH: Orientation, sensorium, mood normal  LINES:  Peripheral iv       Data Review:  Lab Results   Component Value Date    WBC 6.2 04/12/2021    HGB 8.4 (L) 04/12/2021    HCT 26.6 (L) 04/12/2021    MCV 81.4 04/12/2021     04/12/2021     Lab Results   Component Value Date    CREATININE 1.1 04/12/2021    BUN 20 04/12/2021     04/12/2021    K 4.2 04/12/2021     04/12/2021    CO2 27 04/12/2021       Hepatic Function Panel:   Lab Results   Component Value Date    ALKPHOS 63 10/28/2020    ALT 9 10/28/2020    AST 11 10/28/2020    PROT 6.7 10/28/2020    BILITOT <0.2 10/28/2020    BILIDIR <0.2 12/03/2019    IBILI see below 12/03/2019    LABALBU 2.8 10/28/2020       Micro:  4/12 L foot: GS neg    4/7 Surg cult: GBS x 2  4/7 Surg cult 'bone': light M morganii   Wound 'ulcer': M morganii, P stuartii, GBS  Morganella morganii   Antibiotic Interpretation RELL Status    ampicillin Resistant <=2 mcg/mL     ceFAZolin Resistant 32 mcg/mL     ciprofloxacin Sensitive <=0.25 mcg/mL     gentamicin Sensitive <=1 mcg/mL     imipenem Sensitive <=0.25 mcg/mL     levofloxacin Sensitive <=0.12 mcg/mL     piperacillin-tazobactam Sensitive <=4 mcg/mL     trimethoprim-sulfamethoxazole Sensitive <=20 mcg/mL       Providencia stuartii   Antibiotic Interpretation RELL Status    ampicillin Resistant       ceFAZolin Resistant >=64 mcg/mL     cefepime Sensitive <=0.12 mcg/mL     cefTRIAXone Sensitive <=0.25 mcg/mL     ciprofloxacin Sensitive <=0.25 mcg/mL     ertapenem Sensitive <=0.12 mcg/mL     gentamicin Resistant       levofloxacin Sensitive <=0.12 mcg/mL     piperacillin-tazobactam Sensitive <=4 mcg/mL     tobramycin Resistant       trimethoprim-sulfamethoxazole Sensitive <=20 mcg/mL        BC x 2 no growth to date      Imagin/7 L foot x-ray:  1.  Lucencies in 5th metatarsal and 5th proximal phalanx, concerning for   osteomyelitis.  Cannot exclude superimposed fracture. 2.  Soft tissue swelling.  Lateral foot soft tissue air. Path:    Path  A.    Left foot 5th metatarsal, amputation:   -  Bone with acute and chronic osteomyelitis, and reactive/regenerative changes. -  Resection margin involved by osteomyelitis. Jennifer Glassing, left foot ulcerated and amputated tissue:   -   Ulcerated skin with acute inflammation of underlying soft tissue.        Scheduled Meds:   [START ON 2021] enoxaparin  40 mg Subcutaneous Daily    guaiFENesin  600 mg Oral BID    insulin NPH  35 Units Subcutaneous BID AC    cetirizine  10 mg Oral Daily    insulin lispro  10 Units Subcutaneous TID WC    sodium chloride flush  5-40 mL Intravenous 2 times per day    piperacillin-tazobactam  3,375 mg Intravenous Q8H    insulin lispro  0-12 Units Subcutaneous TID WC    insulin lispro  0-6 Units Subcutaneous Nightly    labetalol  100 mg Oral BID    atorvastatin  20 mg Oral Nightly       Continuous Infusions:   sodium chloride      dextrose      sodium chloride       PRN Meds:  HYDROmorphone, HYDROmorphone, fentanNYL, fentanNYL, ondansetron, labetalol, hydrALAZINE, sodium chloride, glucose, dextrose, glucagon (rDNA), dextrose, sodium chloride flush, sodium chloride, promethazine **OR** ondansetron, polyethylene glycol, acetaminophen **OR** acetaminophen, oxyCODONE **OR** oxyCODONE      Assessment:     Hx DM, neuropathy, obesity (BMI 43), HTN, HL  Prior DM foot amputation      L foot infection - gas gangrene / limb threatening with extension osteomyelitis     POD#3 debridement    - reviewed OR note: \"At this time, attention was directed towards the left  foot where a bilobed fetid malodorous ulceration was noted to be present  on the plantar lateral aspect of the fifth metatarsal.  At this time,  this ulcer was circumscribed and ellipsed out. There was copious amount  of necrotic, fetid, malodorous tissue encountered. The base of the  fifth metatarsal was frankly necrotic and the insertion of the peroneus  brevis tendon was also fetid and malodorous. \"    Culture polymicrobial    POD#0 5th toe resection,further debridement    Given limb threatening infection, will recommend course iv antibiotics.   Discussed with Podiatry, pt    Plan:     Change to ceftriaxone + flagyl  PICC  See MYRNA  Wound care and follow-up per Podiatry     Discussed with pt, Podiatry Resident   Mathilda Bence, MD    INFUSION ORDERS:  Ceftriaxone 2 gm iv daily through 5/19   - Diagnosis - L foot gas gangrene / osteomyelitis  - First dose given in hospital  - PICC   - Disposition / date discharge  - Check CBC w diff, CMP, ESR, CRP every Mon or Tue - FAX result to [phone]  - Call with antibiotic / infusion issues, [phone]  - Call with any change in status, transfer in or out of a facility or to [facility] - [phone]  - No f/u in outpatient ID office necessary

## 2021-04-12 NOTE — PROGRESS NOTES
NUTRITION NOTE   Admission Date: 4/6/2021     Type and Reason for Visit: Positive Nutrition Screen    NUTRITION RECOMMENDATIONS:   1. PO Diet: Continue current diet CCC4; encourage dietary protein consumed at each meal.   2. ONS: Levi BID for wound healing; Glucerna BID to aid w/pt meal pattern and increased nutrient needs. 3. Diet education: continue to provide additional diet edu as needed prior to d/c. NUTRITION ASSESSMENT:  Nutrition eval triggered for pt w/wounds- has infected DM ulcer at L toe w/high suspicion of OM. She is s/p I&D 4/7, now w/wound vac. PO intake on CCC diet appears adequate; recent A1c 8.5 improved from 10/2020; per pt this is due to her making \"diet changes\" that include limited carb intake, increased protein and reduced portions. Pt unaware of ONS to aid w/wound healing- RD demonstrated use of Levi at bedside and pt agreed to drink. Pt not used to consuming > 1 full meal daily, but is accepting of using Glucerna to aid w/meeting pt caloric needs associated with wound healing. RD adding wound healing ONS and will monitor PO through admission. MALNUTRITION ASSESSMENT  Context of Malnutrition: Chronic Illness   Malnutrition Status: No malnutrition    NUTRITION DIAGNOSIS   Problem: Problem #1: Increased nutrient needs  Etiology: Increased demand for nutrient  Signs & Symptoms: Presence of wounds     NUTRITION INTERVENTION  Food and/or Nutrient Delivery: Start Oral Nutrition Supplement     Coordination of Nutrition Care: Continue to monitor while inpatient     NUTRITION RISK LEVEL: Risk Level: Low        The patient will still be monitored per nutrition standards of care. Consult dietitian if nutrition interventions essential to patient care is needed.      Kendrick Chase, [address] [place] [address]  Messi:  [phone]  Office:  [phone]

## 2021-04-12 NOTE — PROGRESS NOTES
Pre-Operative Note  Resident Note     Patient: Justin Jorge     Procedure: Incision and drainage, debridement of necrotic and nonviable soft tissue and bone, fifth digit amputation, wound debridement, wound VAC application of left lower extremity    Consent: In chart     Labs:        Recent Labs     04/11/21  0752 04/12/21  0440   WBC 6.4 6.2   HGB 8.4* 8.4*   HCT 26.8* 26.6*   MCV 81.0 81.4    407        Recent Labs     04/11/21  0752 04/12/21  0440    141   K 4.2 4.2    105   CO2 29 27   BUN 15 20   CREATININE 1.1 1.1      No results for input(s): AST, ALT, ALB, BILIDIR, BILITOT, ALKPHOS in the last 72 hours. No results for input(s): LIPASE, AMYLASE in the last 72 hours. Recent Labs     04/11/21  0925   INR 1.17*      No results for input(s): CKTOTAL, CKMB, CKMBINDEX, TROPONINI in the last 72 hours.     Saline lock/IV access: Yes    Clearance for surgery: Yes, per medicine    Diet: NPO     CXR: normal      EKG: Please see EMR     Echocardiogram: not done    PT/INR: 13.6/1.17    IVF: As needed    Abx: IV Zosyn    Type and Screen: O+ (4/11)    Pregnancy test: N/A    Known risk factors for perioperative complications: Diabetes mellitus      Anesthesia to see patient    David Richards DPM  Podiatric Resident, PGY-1  Pager #: [phone] or Perfect Serve

## 2021-04-12 NOTE — PROGRESS NOTES
End of shift report received from Misael Rodriguez [facility]. Patient currently off of unit in surgery, unable to do bedside shift report at this time. Receiving RN will assess patient upon arrival back to unit.

## 2021-04-13 ENCOUNTER — APPOINTMENT (OUTPATIENT)
Dept: GENERAL RADIOLOGY | Age: 76
DRG: 616 | End: 2021-04-13
Payer: MEDICARE

## 2021-04-13 LAB
ANION GAP SERPL CALCULATED.3IONS-SCNC: 9 MMOL/L (ref 3–16)
BASOPHILS ABSOLUTE: 0 K/UL (ref 0–0.2)
BASOPHILS RELATIVE PERCENT: 0.6 %
BUN BLDV-MCNC: 25 MG/DL (ref 7–20)
CALCIUM SERPL-MCNC: 8.3 MG/DL (ref 8.3–10.6)
CHLORIDE BLD-SCNC: 100 MMOL/L (ref 99–110)
CO2: 28 MMOL/L (ref 21–32)
CREAT SERPL-MCNC: 1.4 MG/DL (ref 0.6–1.2)
EOSINOPHILS ABSOLUTE: 0.3 K/UL (ref 0–0.6)
EOSINOPHILS RELATIVE PERCENT: 4 %
GFR AFRICAN AMERICAN: 44
GFR NON-AFRICAN AMERICAN: 37
GLUCOSE BLD-MCNC: 135 MG/DL (ref 70–99)
GLUCOSE BLD-MCNC: 147 MG/DL (ref 70–99)
GLUCOSE BLD-MCNC: 148 MG/DL (ref 70–99)
GLUCOSE BLD-MCNC: 171 MG/DL (ref 70–99)
GLUCOSE BLD-MCNC: 178 MG/DL (ref 70–99)
GLUCOSE BLD-MCNC: 99 MG/DL (ref 70–99)
HCT VFR BLD CALC: 25 % (ref 36–48)
HEMOGLOBIN: 7.8 G/DL (ref 12–16)
LYMPHOCYTES ABSOLUTE: 1.3 K/UL (ref 1–5.1)
LYMPHOCYTES RELATIVE PERCENT: 17.7 %
MCH RBC QN AUTO: 25.7 PG (ref 26–34)
MCHC RBC AUTO-ENTMCNC: 31.2 G/DL (ref 31–36)
MCV RBC AUTO: 82.6 FL (ref 80–100)
MONOCYTES ABSOLUTE: 0.6 K/UL (ref 0–1.3)
MONOCYTES RELATIVE PERCENT: 8.6 %
NEUTROPHILS ABSOLUTE: 5 K/UL (ref 1.7–7.7)
NEUTROPHILS RELATIVE PERCENT: 69.1 %
PDW BLD-RTO: 19.6 % (ref 12.4–15.4)
PERFORMED ON: ABNORMAL
PERFORMED ON: NORMAL
PLATELET # BLD: 421 K/UL (ref 135–450)
PMV BLD AUTO: 7.8 FL (ref 5–10.5)
POTASSIUM SERPL-SCNC: 4.7 MMOL/L (ref 3.5–5.1)
RBC # BLD: 3.03 M/UL (ref 4–5.2)
SODIUM BLD-SCNC: 137 MMOL/L (ref 136–145)
WBC # BLD: 7.2 K/UL (ref 4–11)

## 2021-04-13 PROCEDURE — 6370000000 HC RX 637 (ALT 250 FOR IP): Performed by: INTERNAL MEDICINE

## 2021-04-13 PROCEDURE — 97530 THERAPEUTIC ACTIVITIES: CPT

## 2021-04-13 PROCEDURE — 36569 INSJ PICC 5 YR+ W/O IMAGING: CPT

## 2021-04-13 PROCEDURE — 02HV33Z INSERTION OF INFUSION DEVICE INTO SUPERIOR VENA CAVA, PERCUTANEOUS APPROACH: ICD-10-PCS | Performed by: INTERNAL MEDICINE

## 2021-04-13 PROCEDURE — 97116 GAIT TRAINING THERAPY: CPT

## 2021-04-13 PROCEDURE — 97535 SELF CARE MNGMENT TRAINING: CPT

## 2021-04-13 PROCEDURE — 6370000000 HC RX 637 (ALT 250 FOR IP): Performed by: PODIATRIST

## 2021-04-13 PROCEDURE — 80048 BASIC METABOLIC PNL TOTAL CA: CPT

## 2021-04-13 PROCEDURE — 2580000003 HC RX 258: Performed by: INTERNAL MEDICINE

## 2021-04-13 PROCEDURE — 36415 COLL VENOUS BLD VENIPUNCTURE: CPT

## 2021-04-13 PROCEDURE — 1200000000 HC SEMI PRIVATE

## 2021-04-13 PROCEDURE — 6360000002 HC RX W HCPCS: Performed by: INTERNAL MEDICINE

## 2021-04-13 PROCEDURE — C1751 CATH, INF, PER/CENT/MIDLINE: HCPCS

## 2021-04-13 PROCEDURE — 85025 COMPLETE CBC W/AUTO DIFF WBC: CPT

## 2021-04-13 PROCEDURE — 2500000003 HC RX 250 WO HCPCS: Performed by: INTERNAL MEDICINE

## 2021-04-13 PROCEDURE — 2580000003 HC RX 258: Performed by: PODIATRIST

## 2021-04-13 PROCEDURE — 99232 SBSQ HOSP IP/OBS MODERATE 35: CPT | Performed by: INTERNAL MEDICINE

## 2021-04-13 PROCEDURE — 71045 X-RAY EXAM CHEST 1 VIEW: CPT

## 2021-04-13 RX ADMIN — INSULIN LISPRO 2 UNITS: 100 INJECTION, SOLUTION INTRAVENOUS; SUBCUTANEOUS at 08:37

## 2021-04-13 RX ADMIN — INSULIN LISPRO 10 UNITS: 100 INJECTION, SOLUTION INTRAVENOUS; SUBCUTANEOUS at 18:14

## 2021-04-13 RX ADMIN — Medication 10 ML: at 08:45

## 2021-04-13 RX ADMIN — ATORVASTATIN CALCIUM 20 MG: 20 TABLET, FILM COATED ORAL at 20:56

## 2021-04-13 RX ADMIN — Medication 10 ML: at 22:32

## 2021-04-13 RX ADMIN — ACETAMINOPHEN 650 MG: 325 TABLET ORAL at 08:45

## 2021-04-13 RX ADMIN — INSULIN LISPRO 10 UNITS: 100 INJECTION, SOLUTION INTRAVENOUS; SUBCUTANEOUS at 12:00

## 2021-04-13 RX ADMIN — METRONIDAZOLE 500 MG: 500 TABLET ORAL at 04:49

## 2021-04-13 RX ADMIN — LABETALOL HYDROCHLORIDE 100 MG: 100 TABLET, FILM COATED ORAL at 08:22

## 2021-04-13 RX ADMIN — INSULIN LISPRO 2 UNITS: 100 INJECTION, SOLUTION INTRAVENOUS; SUBCUTANEOUS at 12:00

## 2021-04-13 RX ADMIN — Medication 10 ML: at 08:29

## 2021-04-13 RX ADMIN — LIDOCAINE HYDROCHLORIDE 5 ML: 10 INJECTION, SOLUTION EPIDURAL; INFILTRATION; INTRACAUDAL; PERINEURAL at 10:36

## 2021-04-13 RX ADMIN — INSULIN HUMAN 35 UNITS: 100 INJECTION, SUSPENSION SUBCUTANEOUS at 08:32

## 2021-04-13 RX ADMIN — METRONIDAZOLE 500 MG: 500 TABLET ORAL at 20:56

## 2021-04-13 RX ADMIN — INSULIN LISPRO 10 UNITS: 100 INJECTION, SOLUTION INTRAVENOUS; SUBCUTANEOUS at 08:33

## 2021-04-13 RX ADMIN — CEFTRIAXONE SODIUM 2000 MG: 2 INJECTION, POWDER, FOR SOLUTION INTRAMUSCULAR; INTRAVENOUS at 16:48

## 2021-04-13 RX ADMIN — LABETALOL HYDROCHLORIDE 100 MG: 100 TABLET, FILM COATED ORAL at 20:56

## 2021-04-13 RX ADMIN — Medication 10 ML: at 22:36

## 2021-04-13 RX ADMIN — METRONIDAZOLE 500 MG: 500 TABLET ORAL at 14:53

## 2021-04-13 RX ADMIN — INSULIN HUMAN 35 UNITS: 100 INJECTION, SUSPENSION SUBCUTANEOUS at 18:13

## 2021-04-13 RX ADMIN — GUAIFENESIN 600 MG: 600 TABLET, EXTENDED RELEASE ORAL at 20:56

## 2021-04-13 RX ADMIN — INSULIN LISPRO 1 UNITS: 100 INJECTION, SOLUTION INTRAVENOUS; SUBCUTANEOUS at 20:59

## 2021-04-13 RX ADMIN — GUAIFENESIN 600 MG: 600 TABLET, EXTENDED RELEASE ORAL at 08:22

## 2021-04-13 RX ADMIN — CETIRIZINE HYDROCHLORIDE 10 MG: 10 TABLET, FILM COATED ORAL at 08:23

## 2021-04-13 ASSESSMENT — PAIN DESCRIPTION - ORIENTATION: ORIENTATION: LEFT

## 2021-04-13 ASSESSMENT — PAIN DESCRIPTION - PROGRESSION: CLINICAL_PROGRESSION: NOT CHANGED

## 2021-04-13 ASSESSMENT — PAIN - FUNCTIONAL ASSESSMENT: PAIN_FUNCTIONAL_ASSESSMENT: PREVENTS OR INTERFERES SOME ACTIVE ACTIVITIES AND ADLS

## 2021-04-13 ASSESSMENT — PAIN DESCRIPTION - DESCRIPTORS: DESCRIPTORS: SHARP

## 2021-04-13 ASSESSMENT — PAIN SCALES - GENERAL: PAINLEVEL_OUTOF10: 0

## 2021-04-13 ASSESSMENT — PAIN SCALES - WONG BAKER: WONGBAKER_NUMERICALRESPONSE: 0

## 2021-04-13 NOTE — PROGRESS NOTES
Podiatric Surgery Daily Progress Note  David Abraham      Subjective :   Patient seen and examined this am at the bedside. Patient denies any acute overnight events. She reports occasional sharp, shooting pain to her left lower extremity. Patient denies N/V/F/C/SOB. Patient denies calf pain, thigh pain, chest pain. Review of Systems: A 12 point review of symptoms is unremarkable with the exception of the chief complaint. Patient specifically denies nausea, fever, vomiting, chills, shortness of breath, chest pain, abdominal pain, constipation or difficulty urinating. Objective     /66   Pulse 78   Temp 98.3 °F (36.8 °C) (Oral)   Resp 14   Ht 5' 4\" (1.626 m)   Wt 256 lb (116.1 kg)   LMP  (LMP Unknown)   SpO2 92%   BMI 43.94 kg/m²     I/O:    Intake/Output Summary (Last 24 hours) at 4/13/2021 0848  Last data filed at 4/13/2021 0500  Gross per 24 hour   Intake 240 ml   Output 950 ml   Net -710 ml              Wt Readings from Last 3 Encounters:   04/07/21 256 lb (116.1 kg)   12/29/20 257 lb (116.6 kg)   12/22/20 157 lb (71.2 kg)       LABS:    Recent Labs     04/12/21  0440 04/13/21  0532   WBC 6.2 7.2   HGB 8.4* 7.8*   HCT 26.6* 25.0*    421        Recent Labs     04/13/21  0533      K 4.7      CO2 28   BUN 25*   CREATININE 1.4*        Recent Labs     04/11/21  0925   INR 1.17*           LOWER EXTREMITY EXAMINATION    Dressing to bilateral LE intact. No strikethrough noted to the external dressing. Minimal serous drainage noted to internal layers of RLE dressing. LLE dressing left clean, dry, intact at this time. Wound VAC noted to have excellent seal, set to continuous 125 mmHg. Serosanguineous drainage noted to wound VAC canister. Patient is able to wiggle digits of LLE, gross sensation intact to digits of LLE. No pain with calf compression noted to LLE. FOCUSED RLE EXAMINATION:    VASCULAR: DP and PT pulses nonpalpable.   Upon Doppler examination, DP signals monophasic and PT signals biphasic. CFT is sluggish to the digits of the foot. Skin temperature warm to cool right lower extremity. +1 pitting edema noted. No pain with calf compression.     NEUROLOGIC: Gross and epicritic sensation is diminished. Protective sensation is diminished at all pedal sites.      DERMATOLOGIC: Diffuse dermatologic changes noted. Right lower extremity is a closed soft tissue envelope. Chronic, xerotic skin changes noted. Nails 1-4 are thickened, discolored. Webspaces 1-3 are clean, dry, intact. Images from 4/13. MUSCULOSKELETAL: Muscle strength is 5/5 for all pedal groups tested. No pain with palpation of RLE. Ankle joint ROM is decreased in dorsiflexion with the knee extended. History of previous partial fifth ray amputation right foot. IMAGING:  MRI left foot 4/10  Narrative   MRI FOOT LEFT WO CONTRAST, MRI ANKLE LEFT WO CONTRAST       Indication: Status post left foot incision and debridement and 5th ray resection.       Comparison: Radiographs 4/7/21       Technique: Multiplanar multisequence MR imaging of the left foot and left ankle performed without intravenous contrast.       Findings:       Status post partial amputation of the 5th ray with absence of the metatarsal. The 5th toe remains. There is evidence of osteomyelitis within the 5th proximal phalanx as shown on previous radiographs. Bony defect in the base of the proximal phalanx from    interval operative treatment or the ongoing infection       T2 hyperintense marrow signal alteration within the base and proximal shaft of the 4th metatarsal and the 3rd metatarsal and to a minimal degree in the 2nd metatarsal base. Mild signal changes within the cuboid, lateral and middle cuneiforms.  The signal    on T1-weighted images only mildly hypointense and this can be seen with reactive edema from the TMT joint arthropathy and/or hyperemia, although early osteomyelitis be difficult to exclude.       Postoperative in the soft tissues in the lateral foot. Adjacent susceptibility from soft tissue gas likely postsurgical. There is diffuse T2 hyperintense signal throughout the deep and superficial soft tissues, but no evidence of a viable fluid    collection.       There is no significant marrow signal abnormality or erosion identified in the ankle. Intramuscular and subcutaneous edema signal is present diffusely. No drainable fluid collection.           Impression       1.  Status post partial 5th ray amputation. The remaining toe again demonstrates evidence of osteomyelitis in the proximal phalanx. Bony defect in the base of the proximal phalanx from operative debridement and/or infection. 2.  Multifocal mild marrow signal alteration as described in the mid foot, which is probably related to presence of the TMT degenerative arthrosis and/or regional hyperemia, but early osteomyelitis is not excluded. 3.  Postoperative changes in the soft tissues. No drainable fluid collections identified. Xray left foot 4/12/2021 - post-op  Narrative   Clinical history: Status post fifth digit amputation.       Comparisons: 4/7/2021.       FINDINGS:       4 projections of the left foot. Patient is status post amputation of the small toe since the prior examination. The fifth metatarsal head already been previously amputated. Expected postoperative soft tissue swelling along the lateral left foot.           Impression   1. Status post left small toe amputation.      Xray left foot 4/7/2021  Narrative       Patient: Smiley Galdamez  Time Out: 01:11   Exam(s): FILM LEFT FOOT        EXAM:     XR Left Foot Complete, 3 or More Views       CLINICAL HISTORY:     ulcer, infection.       TECHNIQUE:     Frontal, lateral and oblique views of the left foot.       COMPARISON:     No relevant prior studies available.       FINDINGS:     Bones/joints:  Lucencies in 5th metatarsal and 5th proximal phalanx,    concerning for osteomyelitis.  Cannot exclude superimposed fracture.  No    dislocation.     Soft tissues:  Soft tissue swelling.  Lateral foot soft tissue air.  No    radiopaque foreign body.           Electronically signed by Shashank Barber M.D. on 04-07-21 at 0111       Impression   1.  Lucencies in 5th metatarsal and 5th proximal phalanx, concerning for    osteomyelitis.  Cannot exclude superimposed fracture. 2.  Soft tissue swelling.  Lateral foot soft tissue air.             Arterial duplex 4/8/2021 -preliminary  Tech Comments   Right   The right ankle/brachial index is 0.96 (PT-144 mmHg; DP-136 mmHg). There is normal, multiphasic flow identified in the common femoral artery,   suggesting no evidence of significant aorto-iliac inflow disease. There is atherosclerotic plaque involving the femoro-popliteal level with no   significantly elevated velocities, consistent with <50% stenosis. Limited visualization in the calf due to thickened skin that could not be   penetrated with ultrasound from the mid to distal calf. Left   The left ankle/brachial index is 0.96 (PT-144 mmHg; DP-130 mmHg). There is normal, multiphasic flow identified in the common femoral artery,   suggesting no evidence of significant aorto-iliac inflow disease. There is atherosclerotic plaque involving the femoro-popliteal level with no   significantly elevated velocities, consistent with <50% stenosis. Monophasic   flow is demonstrated throughout the left lower extremity likely due to   hyperemic state. Limited visualization in the distal calf and ankle area due to   wounds/dressings. There are no previous studies for comparison.          ASSESSMENT/PLAN  -S/P Left foot I&D, 5th metatarsal resection, packed open (DOS: 4/7/21) 2/2 Diabetic foot ulceration, OM and gas gangrene, left foot, Earl 3  -S/P Left foot I&D, 5th digit amputation, wound debridement, wound vac application (DOS: 9/45/82)  -Osteomyelitis; 4th metatarsal, 5th metatarsal, 5th proximal phalanx, left lower extremity  -Cellulitis, left lower extremity; improving  -Peripheral arterial disease, bilateral lower extremity  -Peripheral vascular disease, bilateral lower extremity  -Diabetes mellitus type 2 with peripheral neuropathy    -Patient seen and evaluated at the bedside this AM.  -VSS, no leukocytosis noted (WBC 7.2)  -.8. ESR greater than 120.  -Wound culture: Group B strep, Providencia stuartii, Morganella morganii, corynebacterium striatum, bacteroides fragilis  -Surgical pathology 5th metatarsal; acute and chronic osteomyelitis  -Surgical pathology of 5th digit pending  -Blood culture x2- NGTD  -X-rays reviewed and impressions noted above. -Arterial duplex reviewed and impression noted above. -MRI reviewed, impression noted above; marrow signal alteration noted in midfoot, specifically proximal fourth metatarsal.  Given close proximity to wound, and the Intra-Op appearance of the fifth metatarsal, higher concern for osteomyelitis and will plan to treat accordingly. Discussed with ID, will receive 6 weeks of IV antibiotics. -Dressing to left lower extremity left clean, dry, intact at this time. Wound VAC set to continuous 125 mmHg. -Dressing applied to right lower extremity consisting of Adaptic, gauze, Kerlix, Ace bandage. -ID following, continue antibiotics per their recommendations; currently on ceftriaxone, flagyl  -Social work following for discharge planning and likely SNF placement.  -Wound VAC paperwork completed, given to social work. -PT/OT following.  -Nonweightbearing left lower extremity. DISPO:S/P Left foot I&D with 5th metatarsal resection (4/7/12), I&D with 5th digit amputation (4/12/21). Continue IV antibiotics per ID recommendations. Patient will need wound VAC at discharge, paperwork given to social work. No further surgical intervention from podiatric standpoint. Patient is OK to discharge from podiatry standpoint when deemed medically stable.     Discussed assessment and

## 2021-04-13 NOTE — PROGRESS NOTES
Patient is alert and oriented x4. VSS. Dressing is clean, dry, and intact. Patient is voiding per purewick. Patient is tolerating fluids and diet well. Bed in lowest position with bed alarm on. Call light within reach. Patient is now is room 24. Will continue to monitor.

## 2021-04-13 NOTE — PROCEDURES
SINGLE PICC PROCEDURE NOTE  Chart reviewed for allergies, diagnosis, labs, known contraindications, reason for line placement and planned length of treatment. Informed consent noted to be signed and on chart. Insertion procedure discussed with patient/family member. Three patient identifiers - Patient name,   and MRN -  completed &  confirmed verbally. Time out performed Hat, mask and eye shield donned. PICC site cleaned with chlorhexidine wipes then scrubbed with Chloraprep  One-Step applicator for 30 seconds x 1. Hand Hygiene  performed with 3% Chlorhexidine surgical scrub x1 min prior to  Sterile gloves, sterile gown being donned. Patient draped using maximal sterile barrier technique ( head to toe ). PICC site scrubbed a 2nd time with Chloraprep One-Step applicator x 30 sec. Modified Seldinger  technique/ultrasound assisted and tip locating system utilized for insertion. 1% Lidocaine 5 ml injected intradermal pre-insertion. PICC tip location unable to be confirmed with 3CG due to artifact, PCXR ordered for evaluation. Also, utilized guide wire d/t difficulty passing area in the subclavian vein. Positive brisk blood return obtained from all lumens  and each lumen flushed with 10 mls  0.9% Sterile Sodium Chloride. All lumens flush easily with no resistance. Valve placed on all lumens followed by Alcohol Swab Caps on end of each. Bio-patch in place. Catheter secured with non-sutured locking device per hospital protocol. Sterile Tegaderm applied over PICC site. Sterile field maintained during procedure. Positioning, rhythm and guide wire accounted for post procedure and disposed of in sharps. Appearance of site is Clean dry and intact without bleeding or edema. All edges of Tegaderm occlusive. Site marked with date and initials of RN placing line. Teaching performed to pt/family and noted in education section.    Bed placed in low position post-procedure. Top 2 side rails in up position call button in reach. Pt. Response to procedure tolerated well. RN notified of all of the above. A Single Lumen Power PICC was trimmed at 45 CM and placed  WILI per cephalic vein, 1 cm showing from insertion site.

## 2021-04-13 NOTE — PROGRESS NOTES
4 Eyes Admission Assessment     I agree as the admission nurse that 2 RN's have performed a thorough Head to Toe Skin Assessment on the patient. ALL assessment sites listed below have been assessed on admission. Areas assessed by both nurses:   [x]   Head, Face, and Ears   [x]   Shoulders, Back, and Chest  [x]   Arms, Elbows, and Hands   [x]   Coccyx, Sacrum, and Ischium  [x]   Legs, Feet, and Heels        Does the Patient have Skin Breakdown? Patient has blanchable redness to her coccyx, buttocks and back. Patient has scattered bruising and abrasions. Patient has scattered patches of dry, flaky skin. I am unable to assess patient's legs for additional skin breakdown due to surgical dressings covering her surgical incisions and diabetic ulcers on her legs and feet.          Samir Prevention initiated:  Yes   Wound Care Orders initiated:  No, podiatry is already on board      Jesús Crandall consulted for Pressure Injury (Stage 3,4, Unstageable, DTI, NWPT, and Complex wounds) or Samir score 18 or lower:  No      Nurse 1 eSignature: Electronically signed by Breanna Riddle RN on 4/13/21 at 5:13 AM EDT    **SHARE this note so that the co-signing nurse is able to place an eSignature**    Nurse 2 eSignature: Electronically signed by Abigail Anna RN on 4/13/21 at 8:27 AM EDT

## 2021-04-13 NOTE — PROGRESS NOTES
Patient is alert and oriented. Vital signs are stable. Patient's pain is controlled with medication per MAR. Dressing is clean, dry, and intact. Lorelee Carte is in place. Patient has adequate urine output. Bed is in the lowest position. Bed alarm is activated. Call light is within reach. Will continue to monitor and reassess.

## 2021-04-13 NOTE — PROGRESS NOTES
ID Follow-up NOTE    CC:   L DFI, gas ganrene  Antibiotics: Zosyn    Admit Date: 4/6/2021  Hospital Day: 8    Subjective:     POD#1 I&D, L 5th toe amputation, VAC placed    Patient reports she is ok, mild L foot / surg site discomfort  No other complaint     Objective:     Patient Vitals for the past 8 hrs:   BP Temp Temp src Pulse Resp SpO2   04/13/21 1113 (!) 141/73 98.1 °F (36.7 °C) Oral 76 16 90 %   04/13/21 0721 133/66 98.3 °F (36.8 °C) Oral 78 14 92 %   04/13/21 0711 (!) 93/57 98.5 °F (36.9 °C) Oral 85 14 95 %     I/O last 3 completed shifts: In: 440 [P.O.:240; I.V.:200]  Out: 950 [Urine:950]  I/O this shift:  In: 120 [P.O.:120]  Out: -     EXAM:  GENERAL: No apparent distress. HEENT: Membranes moist, no oral lesion  NECK:  Supple, no lymphadenopathy  LUNGS: Clear b/l, no rales, no dullness  CARDIAC: RRR, no murmur appreciated  ABD:  + BS, soft / NT  EXT:  B/l foot dressing.   L foot wound with VAC  NEURO: No focal neurologic findings  PSYCH: Orientation, sensorium, mood normal  LINES:  L PICC (4/13)       Data Review:  Lab Results   Component Value Date    WBC 7.2 04/13/2021    HGB 7.8 (L) 04/13/2021    HCT 25.0 (L) 04/13/2021    MCV 82.6 04/13/2021     04/13/2021     Lab Results   Component Value Date    CREATININE 1.4 (H) 04/13/2021    BUN 25 (H) 04/13/2021     04/13/2021    K 4.7 04/13/2021     04/13/2021    CO2 28 04/13/2021       Hepatic Function Panel:   Lab Results   Component Value Date    ALKPHOS 63 10/28/2020    ALT 9 10/28/2020    AST 11 10/28/2020    PROT 6.7 10/28/2020    BILITOT <0.2 10/28/2020    BILIDIR <0.2 12/03/2019    IBILI see below 12/03/2019    LABALBU 2.8 10/28/2020       Micro:  4/12 L foot: GS neg    4/7 Surg cult: GBS x 2  4/7 Surg cult 'bone': light M morganii  4/7 Wound 'ulcer': M morganii, P stuartii, GBS  Morganella morganii   Antibiotic Interpretation RELL Status    ampicillin Resistant <=2 mcg/mL     ceFAZolin Resistant 32 mcg/mL     ciprofloxacin Continuous Infusions:   sodium chloride      sodium chloride      dextrose      sodium chloride       PRN Meds:  HYDROmorphone, HYDROmorphone, fentanNYL, fentanNYL, labetalol, hydrALAZINE, sodium chloride flush, sodium chloride, sodium chloride, glucose, dextrose, glucagon (rDNA), dextrose, sodium chloride flush, sodium chloride, promethazine **OR** ondansetron, polyethylene glycol, acetaminophen **OR** acetaminophen, oxyCODONE **OR** oxyCODONE      Assessment:     Hx DM, neuropathy, obesity (BMI 43), HTN, HL  Prior DM foot amputation      L foot infection - gas gangrene / limb threatening with extension osteomyelitis     POD#4 debridement    - reviewed OR note: \"At this time, attention was directed towards the left  foot where a bilobed fetid malodorous ulceration was noted to be present  on the plantar lateral aspect of the fifth metatarsal.  At this time,  this ulcer was circumscribed and ellipsed out. There was copious amount  of necrotic, fetid, malodorous tissue encountered. The base of the  fifth metatarsal was frankly necrotic and the insertion of the peroneus  brevis tendon was also fetid and malodorous. \"    Culture polymicrobial    POD#1 5th toe resection,further debridement    Given limb threatening infection, will recommend course iv antibiotics.   Discussed with Podiatry, pt    Plan:     Change to ceftriaxone + flagyl  See MYRNA  Wound care and follow-up per Podiatry     Discussed with pt, Podiatry Resident   Chantal Ryder MD    INFUSION ORDERS:  Ceftriaxone 2 gm iv daily through 5/19   - Diagnosis - L foot gas gangrene / osteomyelitis  - First dose given in hospital  - L PICC  -placed 4/13  - Disposition / date discharge  - Check CBC w diff, CMP, ESR, CRP every Mon or Tue - FAX result to [phone]  - Call with antibiotic / infusion issues, [phone]  - Call with any change in status, transfer in or out of a facility or to hospital - [phone]  - No f/u in outpatient ID office necessary

## 2021-04-13 NOTE — PROCEDURES
PCXR obtained cleared to use. Per radiologist reading noted as follows:      Left arm PICC tip standardly positioned in the low SVC.      [employer] RN notified

## 2021-04-13 NOTE — CARE COORDINATION
Case Management Daily Note                    Date: 4/13/2021     Patient Name: Alvino Mascorro    Date of Admission: 4/6/2021 11:26 PM  YOB: 1945    Length of Stay: 6         Patient Admission Status: Inpatient  Diagnosis:Osteomyelitis [facility]) [M86.9]     ________________________________________________________________________________________  Discharge Plan: SNF: Referrals pending  From [facility] at [facility] 27 w/  APS :  [phone]     Insurance: Payor: Bella Parsons / Plan: Titus Elizabeth ESSENTIAL/PLUS / Product Type: *No Product type* /   Is pre-cert/notification needed: Yes     Tentative discharge date: 4/15 (Jaja Bundyenter will be ready in next 1-2 days. Current barriers: SNF placement, Medical Clearance     Referrals completed: SNF: Multiple referrals. Resources/ information provided: SNF List   ________________________________________________________________________________________  PT AM-PAC: 15 / 24 per last evaluation on: 4/12    OT AM-PAC: 13 / 24 per last evaluation on: 4/12    DME Needs for discharge: defer  ________________________________________________________________________________________  Notes/Plan of Care:   SW rounded on this date. Patient new to unit. Referrals sent for possible placement. SW Placed calls to:  Dixie Fishman at 3:13 pm. - Stephan Adams reported she will re-review patient today and follow up. Brandon Bates - [phone] at 3:42 pm. [facility] to request update on status of referral.   [facility] at 3:30 pm SW [facility] requesting a return call on status of referral. Unable to accept do not take wound vac. Ale Vance - Just sent 4/13   [facility] [phone] at 3:38 pm. (out of network) Cannot accept. [facility] - Sent 4/13  [facility]   [facility] - Sent 4/13. V[facility] Hom[facility] called and is currently only place able to accept.  As of 4:28 pm     If no other accepting facility found pre-cert to be initiated in the AM.     Kenzie Felt and/or her family were provided with choice of provider; she and/or her family are in agreement with the discharge plan at this time.     Care Transition Patient: Yes    SHALA Johnston  [facility]   Case Management Department  Ph: [phone]

## 2021-04-13 NOTE — PLAN OF CARE
Problem: Falls - Risk of:  Goal: Will remain free from falls  Description: Will remain free from falls. Fall precautions in place. Bed is in lowest position, wheels locked, alarm on, non-skid socks on. Call light and bedside table within reach. Patient calls out appropriately. Patient is up x1* assist with gait belt and walker. 4/13/2021 0820 by Franny Kapoor RN  Outcome: Ongoing     Problem: Pain:  Goal: Pain level will decrease  Description: Pain level will decrease. Will continue to assess and monitor.   4/13/2021 0819 by Franny Kapoor RN  Outcome: Completed

## 2021-04-13 NOTE — PROGRESS NOTES
Physical Therapy  Facility/Department: [facility] 5T ORTHO/NEURO  Daily Treatment Note  NAME: Billy Hernandez  :   MRN: [id number]    Date of Service: 2021    Discharge Recommendations:  Billy Hernandez scored a 13/24 on the AM-PAC short mobility form. Current research shows that an AM-PAC score of 17 or less is typically not associated with a discharge to the patient's home setting. Based on the patient's AM-PAC score and their current functional mobility deficits, it is recommended that the patient have 3-5 sessions per week of Physical Therapy at d/c to increase the patient's independence. Please see assessment section for further patient specific details. If patient discharges prior to next session this note will serve as a discharge summary. Please see below for the latest assessment towards goals. Patient would benefit from continued therapy after discharge        Assessment   Assessment: Pt with limited functional mobility due to NWB status of L LE. Pt will benefit from continued inpt PT at d/c. No DME needs. Will follow. Treatment Diagnosis: impaired functional mobility  PT Education: Goals;PT Role;Plan of Care;Transfer Training;General Safety;Weight-bearing Education; Functional Mobility Training  Activity Tolerance  Activity Tolerance: Patient Tolerated treatment well;Patient limited by fatigue;Patient limited by endurance     Patient Diagnosis(es): The primary encounter diagnosis was Other acute osteomyelitis of left foot ([address] Diagnoses of Sepsis due to cellulitis ([address] and Diabetic ulcer of left foot associated with other specified diabetes mellitus, unspecified part of foot, unspecified ulcer stage ([address] were also pertinent to this visit.      has a past medical history of Acute respiratory failure with hypoxia ([address] Hx of blood clots, Hyperlipidemia, Hypertension, Mallet finger, PONV (postoperative nausea and vomiting), and Type II or unspecified type diabetes mellitus without mention of complication, not stated as uncontrolled. has a past surgical history that includes Breast biopsy (Right); Toe amputation (Right, 10/30/2020); Foot Debridement (Left, 4/7/2021); Foot surgery (Left, 04/12/2021); and Foot Debridement (Left, 4/12/2021). Restrictions  Position Activity Restriction  Other position/activity restrictions: NWB left LE, Patient must remain on bedrest until motor and sensory function is back to baseline. Must be assisted the first time the patient is ambulated. Subjective   General  Chart Reviewed: Yes  Additional Pertinent Hx: Pt admitted on 4/6/21 with osteomyelitis. PMH: HTN, DM, PVD, left foot infection. Pt admitted with left foot pain. Dx: osteomyelitis. s/p: LEFT LOWER EXTREMITY FOOT DEBRIDEMENT INCISION AND DRAINAGE WITH PARTIAL 5TH RAY RESECTION on 4/7. LEFT LOWER EXTREMITY EXCISIONAL DEBRIEDMENT MUSCLE FASCIA 11X4 CM WOUND; LEFT FIFTH TOE AMPUTATION, WOUND VAC APPLICAITON (Left). Family / Caregiver Present: No  Referring Practitioner: Juliet Rojas  Subjective  Subjective: Pt supine in bed & agreeable to PT  Pain Screening  Patient Currently in Pain: Denies       Orientation  Orientation  Overall Orientation Status: Within Functional Limits    Objective   Bed mobility  Supine to Sit: Stand by assistance  Scooting: Stand by assistance     Transfers  Sit to Stand: 2 Person Assistance; Moderate Assistance  Stand to sit: 2 Person Assistance; Moderate Assistance     Ambulation  Ambulation?: Yes  Ambulation 1  Device: Standard Walker  Assistance: 2 Person assistance;Minimal assistance  Quality of Gait: Pt maintains inconsistent NWB, impulsivity  Gait Deviations: Slow Unique  Distance: 4 feet       AROM RLE (degrees)  RLE AROM: WNL  AROM LLE (degrees)  LLE General AROM: hip & knee WFL, ankle NT d/t sx     Strength RLE  Strength RLE: WFL  Strength LLE  Comment: NT    Goals  Short term goals  Time Frame for Short term goals: D/C  Short term goal 1: supine<->sit SBA  MET 4/13  Short term goal 2: sit<->stand min A  ONGOING  Short term goal 3: bed<->chair min A  ONGOING  Short term goal 4: Pt will ambulate 6 feet with walker and NWB L LE  Patient Goals   Patient goals : to get well    Plan    Plan  Times per week: 2-5  Current Treatment Recommendations: Functional Mobility Training, Transfer Training, Gait Training, Balance Training, Endurance Training, Safety Education & Training  Safety Devices  Type of devices: Call light within reach, Chair alarm in place, Left in chair, Nurse notified     Therapy Time   Individual Concurrent Group Co-treatment   Time In 1220         Time Out 1248         Minutes 28           Timed Code Treatment Minutes:   13    Total Treatment Minutes:  [address]ard, PT

## 2021-04-13 NOTE — PROGRESS NOTES
Occupational Therapy  Facility/Department: [facility] 5T ORTHO/NEURO  Daily Treatment Note  NAME: Fly Bagley  : [id number]  MRN: [id number]    Date of Service: 2021    Discharge Recommendations:  Fly Bagley scored a 13/24 on the AM-PAC ADL Inpatient form. Current research shows that an AM-PAC score of 17 or less is typically not associated with a discharge to the patient's home setting. Based on the patient's AM-PAC score and their current ADL deficits, it is recommended that the patient have 3-5 sessions per week of Occupational Therapy at d/c to increase the patient's independence. Please see assessment section for further patient specific details. If patient discharges prior to next session this note will serve as a discharge summary. Please see below for the latest assessment towards goals. OT Equipment Recommendations  Equipment Needed: No  Other: defer to next level of care    Assessment   Performance deficits / Impairments: Decreased functional mobility ; Decreased ADL status; Decreased endurance;Decreased ROM; Decreased strength  Assessment: Pt requires mod A of 2 for sit to stand transfers, Ridge to transfer to surface, and is dep with LE dressing/toileint hygiene. She has difficulty maintaining L LE NWB status. Recommend ongoing inpatient OT upon discharge, to increase safety and functional indep. OT Education: OT Role;Transfer Training  Patient Education: educated about NWB status - states understanding, but decreased abiltiy to maintain (would benefit from continued education and reinforcement)  REQUIRES OT FOLLOW UP: Yes  Activity Tolerance  Activity Tolerance: Patient Tolerated treatment well  Safety Devices  Safety Devices in place: Yes  Type of devices: Nurse notified; Left in chair;Chair alarm in place;Call light within reach         Patient Diagnosis(es): The primary encounter diagnosis was Other acute osteomyelitis of left foot ([address]  Diagnoses of Sepsis due to cellulitis ([address] and Diabetic ulcer of left foot associated with other specified diabetes mellitus, unspecified part of foot, unspecified ulcer stage ([address] were also pertinent to this visit. has a past medical history of Acute respiratory failure with hypoxia ([address] Hx of blood clots, Hyperlipidemia, Hypertension, Mallet finger, PONV (postoperative nausea and vomiting), and Type II or unspecified type diabetes mellitus without mention of complication, not stated as uncontrolled. has a past surgical history that includes Breast biopsy (Right); Toe amputation (Right, 10/30/2020); Foot Debridement (Left, 4/7/2021); Foot surgery (Left, 04/12/2021); and Foot Debridement (Left, 4/12/2021). Restrictions  Position Activity Restriction  Other position/activity restrictions: NWB left LE, Patient must remain on bedrest until motor and sensory function is back to baseline. Must be assisted the first time the patient is ambulated. Subjective   General  Chart Reviewed: Yes  Patient assessed for rehabilitation services?: Yes  Additional Pertinent Hx: 76 y.o. F to who presents to ED with left foot pain for the last couple of days. Hospital Course: OR 4/7 for LEFT LOWER EXTREMITY FOOT DEBRIDEMENT INCISION AND DRAINAGE WITH  5TH METATARSAL RESECTION (Left ). On 4/12 to OR for L foot I&D, wth 5th toe amp, wound vac placement  PMH: uncontrolled type 2 diabetes complicated by neuropathy comorbid obesity, hypertension, hyperlipidemia, chronic venous stasis. Family / Caregiver Present: No  Referring Practitioner: Dr. Noelle Iqbal  Diagnosis: OM  Subjective  Subjective: Pt in bed, agreeable to work with OT.   Vital Signs  Patient Currently in Pain: Denies   Orientation  Orientation  Overall Orientation Status: Within Functional Limits  Objective    ADL  LE Dressing: Dependent/Total(donning brief)  Toileting: Independent           Bed mobility  Supine to Sit: Stand by assistance(with head of bed elevated)  Scooting: Stand by assistance  Transfers  Sit to stand: 2 Person assistance(mod A of 2 from edge of bed-1st trial not successful, max A standing balance, assisted to seated position-2nd trial mod A of 2, cues for L LE NWB)  Stand to sit: 2 Person assistance(Ridge of 2)        Transfer-Ridge of 2 bed to simulated 3 in 1 commode, with cues for L LE NWB           Cognition  Overall Cognitive Status: WFL                     Hand Dominance  Hand Dominance: Right           Plan   Plan  Times per week: 2-5  Times per day: Daily  Current Treatment Recommendations: Strengthening, Balance Training, Functional Mobility Training, Endurance Training, Safety Education & Training, Self-Care / ADL  G-Code     OutComes Score                                                  AM-PAC Score             Goals  Short term goals  Time Frame for Short term goals: Discharge  Short term goal 1: simulated BSC transfer w/ Mod A, NWB LLE-4/13 goal not met  Short term goal 2: chair pushups x 5, NWB LLE to increase functional strength for transfers-4/13 goal not addressed  Short term goal 3: increase standing tolerance to 2 minutes, NWB LLE-4/13 goal not met  Short term goal 4: new goal 4/13-pt to do simple grooming/hygiene tasks while seated, indep after set up  Short term goal 5: new goal 4/13-pt to don briefs with max A  Patient Goals   Patient goals : to be indep       Therapy Time   Individual Concurrent Group Co-treatment   Time In 1140         Time Out 1218         Minutes 38           Timed Code Treatment Minutes:   38    Total Treatment Minutes:  [address], OTR/L [place] [address]

## 2021-04-13 NOTE — PROGRESS NOTES
Hospitalist Progress Note      PCP: Alicia Carrizales MD    Date of Admission: 4/6/2021    Chief Complaint: Foot Pain     Hospital Course: 75yo female PMH IDDM complicated by neuropathy and chronic foot wounds and comorbid obesity, HTN, HLD, and chronic venous stasis. Admitted for diabetic foot ulcer with infection and underlying osteomyelitis. S/P debridement and resection of nonviable bone on 4/10 culture polymicrobial. Pt tolerated the procedure well. However the morning after the procedure had significant anemia with Hb 6.6 from 8.6. Was ordered 2U PRBCs to be transfused. S/P LEFT LOWER EXTREMITY: INCISION AND DRAINAGE. EXCISIONAL DEBRIDEMENT OF MUSCLE AND FASCIA [id number] WOUND; LEFT FIFTH TOE AMPUTATION, WOUND VAC APPLICATION on 7/46    Subjective:   Seen and examined  Denies any sore throat, CP, SOB. No acute event reported overnight.            Medications:  Reviewed    Infusion Medications    sodium chloride      sodium chloride      dextrose      sodium chloride       Scheduled Medications    enoxaparin  40 mg Subcutaneous Daily    cefTRIAXone (ROCEPHIN) IV  2,000 mg Intravenous Q24H    metroNIDAZOLE  500 mg Oral 3 times per day    sodium chloride flush  5-40 mL Intravenous 2 times per day    guaiFENesin  600 mg Oral BID    insulin NPH  35 Units Subcutaneous BID AC    cetirizine  10 mg Oral Daily    insulin lispro  10 Units Subcutaneous TID WC    sodium chloride flush  5-40 mL Intravenous 2 times per day    insulin lispro  0-12 Units Subcutaneous TID WC    insulin lispro  0-6 Units Subcutaneous Nightly    labetalol  100 mg Oral BID    atorvastatin  20 mg Oral Nightly     PRN Meds: HYDROmorphone, HYDROmorphone, fentanNYL, fentanNYL, labetalol, hydrALAZINE, sodium chloride flush, sodium chloride, sodium chloride, glucose, dextrose, glucagon (rDNA), dextrose, sodium chloride flush, sodium chloride, promethazine **OR** ondansetron, polyethylene glycol, acetaminophen **OR** BLOODU TRACE-INTACT 11/30/2019    SPECGRAV 1.020 11/30/2019    GLUCOSEU 100 11/30/2019       Radiology:  XR FOOT LEFT (MIN 3 VIEWS)   Final Result   1. Status post left small toe amputation. XR SHOULDER RIGHT (MIN 2 VIEWS)   Final Result      1. No findings for acute traumatic bony abnormality. 2.  Severe arthritic changes in the right shoulder as described. MRI FOOT LEFT WO CONTRAST   Final Result      1. Status post partial 5th ray amputation. The remaining toe again demonstrates evidence of osteomyelitis in the proximal phalanx. Bony defect in the base of the proximal phalanx from operative debridement and/or infection. 2.  Multifocal mild marrow signal alteration as described in the mid foot, which is probably related to presence of the TMT degenerative arthrosis and/or regional hyperemia, but early osteomyelitis is not excluded. 3.  Postoperative changes in the soft tissues. No drainable fluid collections identified. MRI ANKLE LEFT WO CONTRAST   Final Result      1. Status post partial 5th ray amputation. The remaining toe again demonstrates evidence of osteomyelitis in the proximal phalanx. Bony defect in the base of the proximal phalanx from operative debridement and/or infection. 2.  Multifocal mild marrow signal alteration as described in the mid foot, which is probably related to presence of the TMT degenerative arthrosis and/or regional hyperemia, but early osteomyelitis is not excluded. 3.  Postoperative changes in the soft tissues. No drainable fluid collections identified. VL DUP LOWER EXTREMITY ARTERIES BILATERAL   Final Result      XR FOOT LEFT (MIN 3 VIEWS)   Final Result      Status post resection of the fifth metatarsal.      Abnormal lucency in the base of the proximal phalanx of the fifth toe consistent with osteomyelitis. Correlate clinically.             XR CHEST PORTABLE   Final Result     No acute findings in the chest.          XR FOOT LEFT (MIN 3 VIEWS) Final Result   1. Lucencies in 5th metatarsal and 5th proximal phalanx, concerning for    osteomyelitis. Cannot exclude superimposed fracture. 2.  Soft tissue swelling. Lateral foot soft tissue air. XR CHEST PORTABLE    (Results Pending)           Assessment/Plan:    Active Hospital Problems    Diagnosis Date Noted    Diabetic foot infection ([facility] [A76.544, L08.9] 04/08/2021    Group B streptococcal infection [A49.1] 04/08/2021    Chronic osteomyelitis of left foot with draining sinus ([facility] [M86.472] 04/08/2021    Osteomyelitis ([facility] [M86.9] 04/07/2021     Infected Diabetic Foot Ulcer with suspected Osteomyelitis  - S/P amputation of left 5th ray with Podiatry on 4/10  S/P I&D, excisional debridement, left fifth toe amputation with wound vac placement on 4/12  - Zosyn pending further adjustment per ID. Discuss with Dr Moises Silva will follow up on culture before deciding about future ABx recs. Will need long term abx and potentially PICC line. - dressing and wound care per Podiatry  - Roxicodone for pain control    Upper airway congestion, likely due to seasonal allergies: Improved  - chest clear without wheeze or rhonci  - Cetirizine 10mg daily  - Add mucinex    Insulin Dependant Diabetes Mellitus  - BG trend reviewed, continue NPH to 35 units bid  - Lispro 10U TID AC, medium Dose SSI AC HS  - POC Glucose AC HS   - hypoglycemia protocol  - HbA1c 8.5%      Hx of 8mm lung base nodule  - outpatient fu for further imaging    Morbid Obesity: Body mass index is 43.94 kg/m². Hermilo Benavidez Complicating assessment and treatment. Placing patient at risk for multiple co-morbidities as well as early death and contributing to the patient's presentation.  Counseled on diet, weight loss    Other Chronic Issues  HTN- labetalol 100mg BID  HLD - lipitor 20mg daily       DVT Prophylaxis: Lovenox  Diet: DIET CARB CONTROL; Carb Control: 4 carb choices (60 gms)/meal  Code Status: Full Code    PT/OT Eval Status: pending    Dispo -medically stable for d/c needs placement    [facility]  Attending Physician

## 2021-04-14 VITALS
WEIGHT: 256 LBS | BODY MASS INDEX: 43.71 KG/M2 | TEMPERATURE: 98.1 F | DIASTOLIC BLOOD PRESSURE: 72 MMHG | HEIGHT: 64 IN | OXYGEN SATURATION: 91 % | RESPIRATION RATE: 16 BRPM | SYSTOLIC BLOOD PRESSURE: 143 MMHG | HEART RATE: 71 BPM

## 2021-04-14 LAB
ANION GAP SERPL CALCULATED.3IONS-SCNC: 8 MMOL/L (ref 3–16)
BASOPHILS ABSOLUTE: 0 K/UL (ref 0–0.2)
BASOPHILS RELATIVE PERCENT: 0.7 %
BUN BLDV-MCNC: 21 MG/DL (ref 7–20)
CALCIUM SERPL-MCNC: 8.6 MG/DL (ref 8.3–10.6)
CHLORIDE BLD-SCNC: 105 MMOL/L (ref 99–110)
CO2: 28 MMOL/L (ref 21–32)
CREAT SERPL-MCNC: 1.1 MG/DL (ref 0.6–1.2)
EOSINOPHILS ABSOLUTE: 0.3 K/UL (ref 0–0.6)
EOSINOPHILS RELATIVE PERCENT: 4.2 %
GFR AFRICAN AMERICAN: 58
GFR NON-AFRICAN AMERICAN: 48
GLUCOSE BLD-MCNC: 110 MG/DL (ref 70–99)
GLUCOSE BLD-MCNC: 113 MG/DL (ref 70–99)
GLUCOSE BLD-MCNC: 127 MG/DL (ref 70–99)
GLUCOSE BLD-MCNC: 159 MG/DL (ref 70–99)
GLUCOSE BLD-MCNC: 268 MG/DL (ref 70–99)
GLUCOSE BLD-MCNC: 89 MG/DL (ref 70–99)
HCT VFR BLD CALC: 26.5 % (ref 36–48)
HEMOGLOBIN: 8.4 G/DL (ref 12–16)
LYMPHOCYTES ABSOLUTE: 1 K/UL (ref 1–5.1)
LYMPHOCYTES RELATIVE PERCENT: 15.9 %
MCH RBC QN AUTO: 25.9 PG (ref 26–34)
MCHC RBC AUTO-ENTMCNC: 31.8 G/DL (ref 31–36)
MCV RBC AUTO: 81.3 FL (ref 80–100)
MONOCYTES ABSOLUTE: 0.5 K/UL (ref 0–1.3)
MONOCYTES RELATIVE PERCENT: 8.4 %
NEUTROPHILS ABSOLUTE: 4.6 K/UL (ref 1.7–7.7)
NEUTROPHILS RELATIVE PERCENT: 70.8 %
PDW BLD-RTO: 19.9 % (ref 12.4–15.4)
PERFORMED ON: ABNORMAL
PERFORMED ON: NORMAL
PLATELET # BLD: 410 K/UL (ref 135–450)
PMV BLD AUTO: 7.8 FL (ref 5–10.5)
POTASSIUM SERPL-SCNC: 4.3 MMOL/L (ref 3.5–5.1)
RBC # BLD: 3.26 M/UL (ref 4–5.2)
SODIUM BLD-SCNC: 141 MMOL/L (ref 136–145)
WBC # BLD: 6.5 K/UL (ref 4–11)

## 2021-04-14 PROCEDURE — 85025 COMPLETE CBC W/AUTO DIFF WBC: CPT

## 2021-04-14 PROCEDURE — 97535 SELF CARE MNGMENT TRAINING: CPT

## 2021-04-14 PROCEDURE — 6370000000 HC RX 637 (ALT 250 FOR IP): Performed by: INTERNAL MEDICINE

## 2021-04-14 PROCEDURE — 6370000000 HC RX 637 (ALT 250 FOR IP): Performed by: PODIATRIST

## 2021-04-14 PROCEDURE — 97530 THERAPEUTIC ACTIVITIES: CPT

## 2021-04-14 PROCEDURE — 6360000002 HC RX W HCPCS: Performed by: INTERNAL MEDICINE

## 2021-04-14 PROCEDURE — 80048 BASIC METABOLIC PNL TOTAL CA: CPT

## 2021-04-14 PROCEDURE — 6360000002 HC RX W HCPCS: Performed by: PODIATRIST

## 2021-04-14 PROCEDURE — 2580000003 HC RX 258: Performed by: INTERNAL MEDICINE

## 2021-04-14 PROCEDURE — 36415 COLL VENOUS BLD VENIPUNCTURE: CPT

## 2021-04-14 PROCEDURE — 99232 SBSQ HOSP IP/OBS MODERATE 35: CPT | Performed by: INTERNAL MEDICINE

## 2021-04-14 RX ORDER — AMMONIUM LACTATE 12 G/100G
LOTION TOPICAL PRN
Status: DISCONTINUED | OUTPATIENT
Start: 2021-04-14 | End: 2021-04-14 | Stop reason: HOSPADM

## 2021-04-14 RX ORDER — INSULIN LISPRO 100 [IU]/ML
0-3 INJECTION, SOLUTION INTRAVENOUS; SUBCUTANEOUS NIGHTLY
Status: DISCONTINUED | OUTPATIENT
Start: 2021-04-14 | End: 2021-04-14 | Stop reason: HOSPADM

## 2021-04-14 RX ORDER — INSULIN LISPRO 100 [IU]/ML
0-6 INJECTION, SOLUTION INTRAVENOUS; SUBCUTANEOUS
Status: DISCONTINUED | OUTPATIENT
Start: 2021-04-14 | End: 2021-04-14 | Stop reason: HOSPADM

## 2021-04-14 RX ORDER — OXYCODONE HYDROCHLORIDE 5 MG/1
5 TABLET ORAL EVERY 4 HOURS PRN
Qty: 15 TABLET | Refills: 0 | Status: SHIPPED | OUTPATIENT
Start: 2021-04-14 | End: 2021-04-14 | Stop reason: HOSPADM

## 2021-04-14 RX ORDER — OXYCODONE HYDROCHLORIDE 10 MG/1
10 TABLET ORAL EVERY 4 HOURS PRN
Qty: 15 TABLET | Refills: 0 | Status: SHIPPED | OUTPATIENT
Start: 2021-04-14 | End: 2021-04-17

## 2021-04-14 RX ORDER — METRONIDAZOLE 500 MG/1
500 TABLET ORAL EVERY 8 HOURS SCHEDULED
Qty: 105 TABLET | Refills: 0 | Status: SHIPPED | OUTPATIENT
Start: 2021-04-14 | End: 2021-05-19

## 2021-04-14 RX ADMIN — CETIRIZINE HYDROCHLORIDE 10 MG: 10 TABLET, FILM COATED ORAL at 08:36

## 2021-04-14 RX ADMIN — METRONIDAZOLE 500 MG: 500 TABLET ORAL at 15:41

## 2021-04-14 RX ADMIN — ACETAMINOPHEN 650 MG: 325 TABLET ORAL at 07:19

## 2021-04-14 RX ADMIN — CEFTRIAXONE SODIUM 2000 MG: 2 INJECTION, POWDER, FOR SOLUTION INTRAMUSCULAR; INTRAVENOUS at 15:41

## 2021-04-14 RX ADMIN — METRONIDAZOLE 500 MG: 500 TABLET ORAL at 05:55

## 2021-04-14 RX ADMIN — Medication 10 ML: at 08:36

## 2021-04-14 RX ADMIN — LABETALOL HYDROCHLORIDE 100 MG: 100 TABLET, FILM COATED ORAL at 08:36

## 2021-04-14 RX ADMIN — INSULIN LISPRO 1 UNITS: 100 INJECTION, SOLUTION INTRAVENOUS; SUBCUTANEOUS at 11:44

## 2021-04-14 RX ADMIN — GUAIFENESIN 600 MG: 600 TABLET, EXTENDED RELEASE ORAL at 08:36

## 2021-04-14 RX ADMIN — ENOXAPARIN SODIUM 40 MG: 40 INJECTION SUBCUTANEOUS at 08:36

## 2021-04-14 RX ADMIN — INSULIN LISPRO 3 UNITS: 100 INJECTION, SOLUTION INTRAVENOUS; SUBCUTANEOUS at 17:34

## 2021-04-14 ASSESSMENT — PAIN SCALES - GENERAL: PAINLEVEL_OUTOF10: 2

## 2021-04-14 NOTE — CARE COORDINATION
Case Management            Discharge Note                    Date / Time of Note: 4/14/2021 4:08 PM                  Discharge Note Completed by: Yaquelin Mountain Day    Patient Name: Inez Spatz   YOB: 1945  Diagnosis: Osteomyelitis [facility]) [M86.9]   Date / Time: 4/6/2021 11:26 PM    Current PCP: Prabha Ley MD  Clinic patient: Yes    Hospitalization in the last 30 days: No    Advance Directives:  Code Status: Full Code  [place] DNR form completed and on chart: Not Indicated    Financial:  Payor: Tino Rojo / Plan: Stacy Castaneda ESSENTIAL/PLUS / Product Type: *No Product type* /      Pharmacy:    [address], [address] [place] [address]  Phone: [phone] Fax: [phone]      Assistance purchasing medications?: Potential Assistance Purchasing Medications: No  Assistance provided by Case Management: None at this time    Does patient want to participate in local refill/ meds to beds program?: No    Meds To Beds General Rules:  1. Can ONLY be done Monday- Friday between 8:30am-5pm  2. Prescription(s) must be in pharmacy by 3pm to be filled same day  3. Copy of patient's insurance/ prescription drug card and patient face sheet must be sent along with the prescription(s)  4. Cost of Rx cannot be added to hospital bill. If financial assistance is needed, please contact unit  or ;  or  CANNOT provide pharmacy voucher for patients co-pays  5.  Patients can then  the prescription on their way out of the hospital at discharge, or pharmacy can deliver to the bedside if staff is available. (payment due at time of pick-up or delivery - cash, check, or card accepted)     Able to afford home medications/ co-pay costs: Yes    ADLS:  Current PT AM-PAC Score: 13 /24  Current OT AM-PAC Score: 13 /24      Discharge Disposition: [place] (SNF)  [facility]  [address], [address]  Phone: [phone]  Fax: [phone]    LOC at discharge: Skilled  MYRNA Completed: Yes    Notification completed in HENS/PAS?:  Yes : CM has completed HENS online through secure website for SNF admission at [facility]. Document ID #: [id number]    IMM Completed:   No    Transportation:  Transportation Plan for discharge: EMS transportation   Mode of Transport: Ambulance stretcher - BLS    Reason for medical transport: Bed confined: Meets the following criteria 1) unable to get out of bed without assistance or ambulate, 2) unable to safely sit up in a wheelchair, 3) unable to maintain erect seating position in a chair for time needed for transport  Name of [address] [place] [address] [address]: [employer]: [phone]  Transport Time: 7:30 pm     Transportation form completed: Yes    Referrals made at [facility] for outpatient continued care:  Not Applicable    Additional CM Notes:   Patient is medically ready for discharge. Patient agreeable to discharge to [facility]. Transport with [address] [facility] at 7:30 pm. SW faxed clinicals to facility. No other needs noted at this time. The Plan for Transition of Care is related to the following treatment goals Osteomyelitis [facility]) [M86.9]      The Patient and/or patient representative Patient was provided with a choice of provider and agrees with the discharge plan Yes    Freedom of choice list was provided with basic dialogue that supports the patient's individualized plan of care/goals and shares the quality data associated with the providers.  Yes    Care Transitions patient: Yes    SHALA Hall  [facility]   Case Management Department  Ph: [phone]

## 2021-04-14 NOTE — PROGRESS NOTES
ID Follow-up NOTE    CC:   L DFI, gas ganrene  Antibiotics: Zosyn    Admit Date: 4/6/2021  Hospital Day: 9    Subjective:     POD#2 I&D, L 5th toe amputation, VAC placed    Patient reports she is ok, mild L foot / surg site discomfort  No other complaint     Objective:     Patient Vitals for the past 8 hrs:   BP Temp Temp src Pulse Resp SpO2   04/14/21 1122 (!) 141/57 98.4 °F (36.9 °C) Oral 76 16 92 %   04/14/21 1036 131/68 98.4 °F (36.9 °C) Oral 75 18 94 %     I/O last 3 completed shifts: In: 427 [P.O.:660; I.V.:10]  Out: 775 [Urine:775]  No intake/output data recorded. EXAM:  GENERAL: No apparent distress. HEENT: Membranes moist, no oral lesion  NECK:  Supple, no lymphadenopathy  LUNGS: Clear b/l, no rales, no dullness  CARDIAC: RRR, no murmur appreciated  ABD:  + BS, soft / NT  EXT:  B/l foot dressing.   L foot wound clean - saw at time of Podiatry dressing change   NEURO: No focal neurologic findings  PSYCH: Orientation, sensorium, mood normal  LINES:  L PICC (placed 4/13)       Data Review:  Lab Results   Component Value Date    WBC 6.5 04/14/2021    HGB 8.4 (L) 04/14/2021    HCT 26.5 (L) 04/14/2021    MCV 81.3 04/14/2021     04/14/2021     Lab Results   Component Value Date    CREATININE 1.1 04/14/2021    BUN 21 (H) 04/14/2021     04/14/2021    K 4.3 04/14/2021     04/14/2021    CO2 28 04/14/2021       Hepatic Function Panel:   Lab Results   Component Value Date    ALKPHOS 63 10/28/2020    ALT 9 10/28/2020    AST 11 10/28/2020    PROT 6.7 10/28/2020    BILITOT <0.2 10/28/2020    BILIDIR <0.2 12/03/2019    IBILI see below 12/03/2019    LABALBU 2.8 10/28/2020       Micro:  4/12 L foot: GS neg; cut mixed skin antonio, gp B Streptococcus from broth    4/7 Surg cult: GBS, M morganii, R rettgeri, B fragilis  4/7 Surg cult 'bone': light M morganii  4/7 Wound 'ulcer': M morganii, P stuartii, GBS  Morganella morganii   Antibiotic Interpretation RELL Status    ampicillin Resistant <=2 mcg/mL ceFAZolin Resistant 32 mcg/mL     ciprofloxacin Sensitive <=0.25 mcg/mL     gentamicin Sensitive <=1 mcg/mL     imipenem Sensitive <=0.25 mcg/mL     levofloxacin Sensitive <=0.12 mcg/mL     piperacillin-tazobactam Sensitive <=4 mcg/mL     trimethoprim-sulfamethoxazole Sensitive <=20 mcg/mL       [facility]   Antibiotic Interpretation RELL Status    ampicillin Resistant       ceFAZolin Resistant >=64 mcg/mL     cefepime Sensitive <=0.12 mcg/mL     cefTRIAXone Sensitive <=0.25 mcg/mL     ciprofloxacin Sensitive <=0.25 mcg/mL     ertapenem Sensitive <=0.12 mcg/mL     gentamicin Resistant       levofloxacin Sensitive <=0.12 mcg/mL     piperacillin-tazobactam Sensitive <=4 mcg/mL     tobramycin Resistant       trimethoprim-sulfamethoxazole Sensitive <=20 mcg/mL        BC x 2 no growth      Imagin/7 L foot x-ray:  1.  Lucencies in 5th metatarsal and 5th proximal phalanx, concerning for   osteomyelitis.  Cannot exclude superimposed fracture. 2.  Soft tissue swelling.  Lateral foot soft tissue air. Path:    Path  A.    Left foot 5th metatarsal, amputation:   -  Bone with acute and chronic osteomyelitis, and reactive/regenerative changes. -  Resection margin involved by osteomyelitis. Rhianna Board, left foot ulcerated and amputated tissue:   -   Ulcerated skin with acute inflammation of underlying soft tissue.        Scheduled Meds:   insulin NPH  30 Units Subcutaneous BID AC    insulin lispro  0-6 Units Subcutaneous TID WC    insulin lispro  0-3 Units Subcutaneous Nightly    enoxaparin  40 mg Subcutaneous Daily    cefTRIAXone (ROCEPHIN) IV  2,000 mg Intravenous Q24H    metroNIDAZOLE  500 mg Oral 3 times per day    sodium chloride flush  5-40 mL Intravenous 2 times per day    guaiFENesin  600 mg Oral BID    cetirizine  10 mg Oral Daily    sodium chloride flush  5-40 mL Intravenous 2 times per day    labetalol  100 mg Oral BID    atorvastatin  20 mg Oral Nightly       Continuous Infusions:   sodium chloride      sodium chloride      dextrose      sodium chloride       PRN Meds:  ammonium lactate, HYDROmorphone, HYDROmorphone, fentanNYL, fentanNYL, labetalol, hydrALAZINE, sodium chloride flush, sodium chloride, sodium chloride, glucose, dextrose, glucagon (rDNA), dextrose, sodium chloride flush, sodium chloride, promethazine **OR** ondansetron, polyethylene glycol, acetaminophen **OR** acetaminophen, oxyCODONE **OR** oxyCODONE      Assessment:     Hx DM, neuropathy, obesity (BMI 43), HTN, HL  Prior DM foot amputation      L foot infection - gas gangrene / limb threatening with extension osteomyelitis     4/7/21 - debridement    - reviewed OR note: \"At this time, attention was directed towards the left  foot where a bilobed fetid malodorous ulceration was noted to be present  on the plantar lateral aspect of the fifth metatarsal.  At this time,  this ulcer was circumscribed and ellipsed out. There was copious amount  of necrotic, fetid, malodorous tissue encountered. The base of the  fifth metatarsal was frankly necrotic and the insertion of the peroneus  brevis tendon was also fetid and malodorous. \"    Culture polymicrobial    4/23/21 - 5th toe resection,further debridement    Given limb threatening infection, will recommend course iv antibiotics.   Discussed with Podiatry, pt    Plan:     Cont ceftriaxone + flagyl  See MYRNA  Wound care and follow-up per Podiatry     Discussed with pt, Podiatry Resident   Lauren Lomas MD    INFUSION ORDERS:  Ceftriaxone 2 gm iv daily through 5/19 (flagyl 500 po tid for same duration)  - Diagnosis - L foot gas gangrene / osteomyelitis  - First dose given in hospital  - L PICC  -placed 4/13  - Disposition / date discharge  - Check CBC w diff, CMP, ESR, CRP every Mon or Tue - FAX result to [phone]  - Call with antibiotic / infusion issues, [phone]  - Call with any change in status, transfer in or out of a facility or to hospital - [phone]  - No f/u in outpatient ID office necessary

## 2021-04-14 NOTE — CARE COORDINATION
Case Management Daily Note                    Date: 4/14/2021     Patient Name: Justin Jorge    Date of Admission: 4/6/2021 11:26 PM  YOB: 1945    Length of Stay: 7         Patient Admission Status: Inpatient  Diagnosis:Osteomyelitis [facility]) [M86.9]     ________________________________________________________________________________________  Discharge Plan: SNF: [facility]  From home independent at [place] [address] w/  APS :  [phone]     Insurance: Payor: Wil Armendariz / Plan: Zara Esparza ESSENTIAL/PLUS / Product Type: *No Product type* /   Is pre-cert/notification needed: Yes, but might be able to [id number]. Tentative discharge date: 4/14 vs 4/15    Current barriers: SNF placement, Medical Clearance     Referrals completed: SNF: Multiple referrals. Resources/ information provided: SNF List   ________________________________________________________________________________________  PT AM-PAC: 15 / 24 per last evaluation on: 4/12    OT AM-PAC: 13 / 24 per last evaluation on: 4/12    DME Needs for discharge: defer  ________________________________________________________________________________________  Notes/Plan of Care:   SW rounded on this date. [facility] called late yesterday and can accept. [facility] is willing to review today. Based on [facility] response: patient may have choice vs starting pre-cert with [facility]. If [facility]. Patient may qualify floor to SNF    SW Placed calls to:  [facility] to accept   [facility] - [facility] - [phone]. Unable to accept do not take wound vac. [address] [facility] - No Response   [address] 4/13  [facility] - Sent 4/13. CAN ACCEPT   [facility] [place] - Sent 4/13. No response. [facility] called and is currently only place able to accept.  As of 4:28 pm     If no other accepting facility found pre-cert to be initiated in the AM.     UPDATE: 2:14 pm. AMOS spoke with patient at bedside and provided update at 12:04 pm. AMOS updated patient at bedside. AMOS has not received a response from [facility]. Decision was made to move forward with [facility]. AMOS paged Dr. Shruthi Webster regarding possible discharge today. Celia Gallagher and/or her family were provided with choice of provider; she and/or her family are in agreement with the discharge plan at this time.     Care Transition Patient: Yes    SHALA Stevenson  [facility]   Case Management Department  Ph: [phone]

## 2021-04-14 NOTE — PLAN OF CARE
Problem: Falls - Risk of:  Goal: Will remain free from falls  Description: Will remain free from falls  Outcome: Ongoing   Bed in lowest position, bed alarm on, call light within reach. Pt utilizing call light. Pt free from falls thus far into the shift.

## 2021-04-14 NOTE — PROGRESS NOTES
Physical Therapy  Facility/Department: [facility] 5T ORTHO/NEURO  Daily Treatment Note  NAME: Jimmy Solorzano  : [id number]  MRN: [id number]    Date of Service: 2021    Discharge Recommendations: Jimmy Solorzano scored a 13/24 on the AM-PAC short mobility form. Current research shows that an AM-PAC score of 17 or less is typically not associated with a discharge to the patient's home setting. Based on the patient's AM-PAC score and their current functional mobility deficits, it is recommended that the patient have 3-5 sessions per week of Physical Therapy at d/c to increase the patient's independence. Please see assessment section for further patient specific details. If patient discharges prior to next session this note will serve as a discharge summary. Please see below for the latest assessment towards goals. Patient would benefit from continued therapy after discharge   PT Equipment Recommendations  Equipment Needed: (defer)    Assessment   Body structures, Functions, Activity limitations: Decreased functional mobility ; Decreased ADL status; Decreased safe awareness;Decreased endurance;Decreased balance  Assessment: Sit>stand required less A on second try during session. Pt able to maintain NWB to LLE when standing/pivoting. Does require 2 person A for transfers/mobility. Not capable of managing at home at this time & will need ongoing IP PT at [place]. Treatment Diagnosis: impaired functional mobility  Prognosis: Good  PT Education: Goals;PT Role;Plan of Care;Transfer Training;General Safety;Weight-bearing Education; Functional Mobility Training  Patient Education: NWB LLE-verbalized understanding  REQUIRES PT FOLLOW UP: Yes  Activity Tolerance  Activity Tolerance: Patient Tolerated treatment well     Patient Diagnosis(es): The primary encounter diagnosis was Other acute osteomyelitis of left foot ([facility] [address]  Diagnoses of Sepsis due to cellulitis ([facility] [address] and Diabetic ulcer of left foot associated with other specified diabetes mellitus, unspecified part of foot, unspecified ulcer stage ([address] were also pertinent to this visit. has a past medical history of Acute respiratory failure with hypoxia ([address] Hx of blood clots, Hyperlipidemia, Hypertension, Mallet finger, PONV (postoperative nausea and vomiting), and Type II or unspecified type diabetes mellitus without mention of complication, not stated as uncontrolled. has a past surgical history that includes Breast biopsy (Right); Toe amputation (Right, 10/30/2020); Foot Debridement (Left, 4/7/2021); Foot surgery (Left, 04/12/2021); and Foot Debridement (Left, 4/12/2021). Restrictions  Position Activity Restriction  Other position/activity restrictions: NWB left LE, Patient must remain on bedrest until motor and sensory function is back to baseline. Must be assisted the first time the patient is ambulated. Subjective   General  Chart Reviewed: Yes  Additional Pertinent Hx: Pt admitted on 4/6/21 with osteomyelitis. PMH: HTN, DM, PVD, left foot infection. Pt admitted with left foot pain. Dx: osteomyelitis. s/p: LEFT LOWER EXTREMITY FOOT DEBRIDEMENT INCISION AND DRAINAGE WITH PARTIAL 5TH RAY RESECTION on 4/7. LEFT LOWER EXTREMITY EXCISIONAL DEBRIEDMENT MUSCLE FASCIA 11X4 CM WOUND; LEFT FIFTH TOE AMPUTATION, WOUND VAC APPLICAITON (Left). Family / Caregiver Present: No  Referring Practitioner: Avinash Jeffers  Subjective  Subjective: Pt supine in bed & agreeable to PT. Reports unable to go home at dc & will go to rehab somewhere. Reports significant difficulty at home recently. Was sleeping at kitchen table due to inability to get upstairs to bedroom. Reports damage in home- ceiling has collapsed in 1 room & other problems.   Pain Screening  Patient Currently in Pain: Yes(7/10 L foot, RN aware)  Vital Signs  Patient Currently in Pain: Yes(7/10 L foot, RN aware)       Orientation  Orientation  Overall Orientation Status: Within Functional Limits     Objective   Bed mobility  Supine to Sit: Stand by assistance(HOB elev with rail)  Transfers  Sit to Stand: 2 Person Assistance; Moderate Assistance(Min A of 2 initially- progressed to Mod A of 2 (1st trial); 2nd trial pt was Min a of 2)  Stand to sit: 2 Person Assistance;Minimal Assistance(Min a of 2)  Stand Pivot Transfers: 2 Person Assistance(MIN A of 2- stand pivot transfer with pt scooting on R foot, NWB LLE; unable to hop)        Balance  Sitting - Static: Good(EOB sitting x 10 min indep)    Static stand min a of 1 with walker, NWB LLE. Stood 2x for 30 sec, 15 sec.                                                                          AM-PAC Score  AM-PAC Inpatient Mobility Raw Score : 13 (04/14/21 1332)  AM-PAC Inpatient T-Scale Score : 36.74 (04/14/21 1332)  Mobility Inpatient CMS 0-100% Score: 64.91 (04/14/21 1332)  Mobility Inpatient CMS G-Code Modifier : CL (04/14/21 1332)          Goals  Short term goals  Time Frame for Short term goals: D/C  Short term goal 1: supine<->sit SBA  MET 4/13  Short term goal 2: sit<->stand min A  ONGOING  Short term goal 3: bed<->chair min A  ONGOING  Short term goal 4: Pt will ambulate 6 feet with walker and NWB L LE- ONGOING  Patient Goals   Patient goals : to get well    Plan    Plan  Times per week: 2-5  Current Treatment Recommendations: Functional Mobility Training, Transfer Training, Gait Training, Balance Training, Endurance Training, Safety Education & Training  Safety Devices  Type of devices: Call light within reach, Chair alarm in place, Left in chair, Nurse notified     Therapy Time   Individual Concurrent Group Co-treatment   Time In 1155         Time Out 1222         Minutes 27           Timed Code Treatment Minutes:   27    Total Treatment Minutes:  Bienven[address]Boyd [address], [address] [facility]

## 2021-04-14 NOTE — PLAN OF CARE
Podiatric Surgery Plan of Care Note  Debria Citizen      Patient to be discharged to SNF today. Wound vac dressing of LLE removed and wet to dry dressing applied to LLE. Patient to have wound vac reapplied at SNF per MYRNA instructions.      Drury Gottron, DPM  Podiatric Resident, PGY-1  Pager #: [phone] or Perfect Serve

## 2021-04-14 NOTE — DISCHARGE SUMMARY
Hospital Discharge Summary    Patient's PCP: Jerry Vizcarra MD  Admit Date: 4/6/2021   Discharge Date: 4/14/21    Admitting Physician: Dr. Lyn Walsh MD  Discharge Physician: Dr. Jorge A Gonzalezier:   EVIN Perdomo Bussing TO INFECTIOUS DISEASES  IP CONSULT TO SPIRITUAL SERVICES    Brief HPI: 70-year-old female history of uncontrolled type 2 diabetes complicated by neuropathy comorbid obesity, hypertension, hyperlipidemia, chronic venous stasis presents to ED with complains of left foot pain and drainage. She tells me that 3 days ago she had needed to walk on her feet and noticed blood tinged drainage, has had progressive l swelling tightness and redness in the left lower extremity since then. She denies fever chills sweats, but says may have felt hot. Denies nausea vomiting or appetite changes. She was being followed by podiatry until Thanksgiving but says her podiatrist Uzma Fearing tired and has not seen anyone since then. She was last seen by her primary care physician more than a year ago as she would not go in the office due to COVID-19. She has not had lab work-up for a year as well. She tells me her sugars run anywhere between 120s to 250s.    Vitals in the ED afebrile, blood pressure elevated, heart rate 80s to 90s, 97% on room air. On exam left lower extremity wrapped, reviewed images from podiatry note. Largest ulcer 6 L on the plantar surface, with purulent drainage noted. Right lower extremity with chronic venous stasis dermatitis Labs showed leukocytosis 1.3, hemoglobin 8.6, with microcytosis, elevated inflammatory markers with ESR greater than 120, .8.  X-ray of the left foot shows fifth metatarsal and fifth proximal phalanx osteomyelitis with surrounding soft tissue swelling     Patient may admitted to hospital for management of diabetic foot infection, cellulitis in the setting of uncontrolled type 2 diabetes with peripheral neuropathy. Brief hospital course:   -Infected Diabetic Foot Ulcer with suspected Osteomyelitis  - S/P amputation of left 5th ray with Podiatry on 4/10  S/P I&D, excisional debridement, left fifth toe amputation with wound vac placement on 4/12  -on iv ceftriaxone  till 5/19 - dressing and wound care per Podiatry  - Roxicodone for pain control     Upper airway congestion, likely due to seasonal allergies: Improved  - chest clear without wheeze or rhonci  - Cetirizine 10mg daily  - Add mucinex     Insulin Dependant Diabetes Mellitus  - BG trend reviewed, continue NPH to 35 units bid  - Lispro 10U TID AC, medium Dose SSI AC HS  - POC Glucose AC HS   - hypoglycemia protocol  - HbA1c 8.5%      Hx of 8mm lung base nodule  - outpatient fu for further imaging     Morbid Obesity: Body mass index is 43.94 kg/m². Reji [place] Complicating assessment and treatment. Placing patient at risk for multiple co-morbidities as well as early death and contributing to the patient's presentation. Counseled on diet, weight loss     Other Chronic Issues  HTN- labetalol 100mg BID  HLD - lipitor 20mg daily      Invasive procedures:  S/P LEFT LOWER EXTREMITY: INCISION AND DRAINAGE. EXCISIONAL DEBRIDEMENT OF MUSCLE AND FASCIA [id number] WOUND; LEFT FIFTH TOE AMPUTATION, WOUND VAC APPLICATION on 9/77    Discharge Diagnoses: Active Problems:    Osteomyelitis (HCC)    Diabetic foot infection ([facility] [address]    Group B streptococcal infection    Chronic osteomyelitis of left foot with draining sinus (HCC)  Resolved Problems:    * No resolved hospital problems. *      Physical Exam: BP (!) 143/72   Pulse 71   Temp 98.1 °F (36.7 °C) (Oral)   Resp 16   Ht 5' 4\" (1.626 m)   Wt 256 lb (116.1 kg)   LMP  (LMP Unknown)   SpO2 91%   BMI 43.94 kg/m²   General appearance: NAD  HEENT: Pupils equal, round, and reactive to light. Conjunctivae/corneas clear. Neck: Supple, with full range of motion. No jugular venous distention. Trachea midline. Respiratory:  Normal respiratory effort. Clear to auscultation, bilaterally without Rales/Wheezes/Rhonchi. Cardiovascular: Regular rate and rhythm with normal S1/S2 without murmurs, rubs or gallops. Abdomen: Soft, non-tender, non-distended with normal bowel sounds. Musculoskeletal: bilateral lower extremity wrapped, wound vac present left foot  Skin: Skin color, texture, turgor normal.  No rashes or lesions. Neurologic:  AOx3, no focal neuro deficit noted. Psychiatric: Alert and oriented, thought content appropriate, normal insight  Capillary Refill: Brisk,< 3 seconds   Peripheral Pulses: +2 palpable, equal bilaterally       Significant diagnostic studies that may require follow up:  Xr Shoulder Right (min 2 Views)    Result Date: 4/10/2021  3 VIEWS OF THE RIGHT SHOULDER HISTORY: Pain, decreased range of motion FINDINGS: No fracture or dislocation. There is severe arthritic changes with hypertrophic spurring and complete loss of joint space in the glenohumeral joint. Evaluation of the [facility] joint is limited. No discrete soft tissue abnormality identified. 1.  No findings for acute traumatic bony abnormality. 2.  Severe arthritic changes in the right shoulder as described. Xr Foot Left (min 3 Views)    Result Date: 4/12/2021  Clinical history: Status post fifth digit amputation. Comparisons: 4/7/2021. FINDINGS: 4 projections of the left foot. Patient is status post amputation of the small toe since the prior examination. The fifth metatarsal head already been previously amputated. Expected postoperative soft tissue swelling along the lateral left foot. 1. Status post left small toe amputation. Xr Foot Left (min 3 Views)    Result Date: 4/7/2021  LEFT FOOT: HISTORY: Postop study. TECHNIQUE: 3 views obtained. COMPARISON: 4/17/2021.  FINDINGS: There has been surgical resection of the fifth metatarsal. Lucency in the base of the proximal phalanx of the fifth toe is noted, with lateral and proximal cortical loss. There is localized soft tissue swelling with scattered gas consistent with postoperative changes in the lateral aspect of the foot. Other bony structures appear intact. Status post resection of the fifth metatarsal. Abnormal lucency in the base of the proximal phalanx of the fifth toe consistent with osteomyelitis. Correlate clinically. Xr Foot Left (min 3 Views)    Result Date: 4/7/2021  Patient: Noah Zavala  Time Out: 01:11 Exam(s): FILM LEFT FOOT  EXAM:   XR Left Foot Complete, 3 or More Views  CLINICAL HISTORY:   ulcer, infection. TECHNIQUE:   Frontal, lateral and oblique views of the left foot. COMPARISON:   No relevant prior studies available. FINDINGS:   Bones/joints:  Lucencies in 5th metatarsal and 5th proximal phalanx, concerning for osteomyelitis. Cannot exclude superimposed fracture. No dislocation. Soft tissues:  Soft tissue swelling. Lateral foot soft tissue air. No radiopaque foreign body. Electronically signed by Salome Shipley M.D. on 04-07-21 at 0111    1. Lucencies in 5th metatarsal and 5th proximal phalanx, concerning for osteomyelitis. Cannot exclude superimposed fracture. 2.  Soft tissue swelling. Lateral foot soft tissue air. Xr Chest Portable    Result Date: 4/13/2021  Chest portable 1055 HISTORY: PICC placement     Left arm PICC tip standardly positioned in the low SVC. Clear lungs. Top normal heart size. Xr Chest Portable    Result Date: 4/7/2021  Patient: Noah Lucas  Time Out: 06:51 Exam(s): FILM CXR 1 VIEW  EXAM:   XR Chest, 1 View  CLINICAL HISTORY:   pre-op. TECHNIQUE:   Frontal view of the chest.  COMPARISON:   12/2/19  FINDINGS:   Lungs:  Patient's chin obscures right upper lung apex. Pleural space:  Unremarkable. No pneumothorax. Heart:  Unremarkable. No cardiomegaly. Mediastinum:  Unremarkable. Bones/joints: No acute findings.    Electronically signed by Nhung Donohue M.D. on 04-07-21 at 9682      No acute findings in the chest.      Mri Foot Left Wo Contrast    Result Date: 4/10/2021  MRI FOOT LEFT WO CONTRAST, MRI ANKLE LEFT WO CONTRAST Indication: Status post left foot incision and debridement and 5th ray resection. Comparison: Radiographs 4/7/21 Technique: Multiplanar multisequence MR imaging of the left foot and left ankle performed without intravenous contrast. Findings: Status post partial amputation of the 5th ray with absence of the metatarsal. The 5th toe remains. There is evidence of osteomyelitis within the 5th proximal phalanx as shown on previous radiographs. Bony defect in the base of the proximal phalanx from interval operative treatment or the ongoing infection T2 hyperintense marrow signal alteration within the base and proximal shaft of the 4th metatarsal and the 3rd metatarsal and to a minimal degree in the 2nd metatarsal base. Mild signal changes within the cuboid, lateral and middle cuneiforms. The signal on T1-weighted images only mildly hypointense and this can be seen with reactive edema from the TMT joint arthropathy and/or hyperemia, although early osteomyelitis be difficult to exclude. Postoperative in the soft tissues in the lateral foot. Adjacent susceptibility from soft tissue gas likely postsurgical. There is diffuse T2 hyperintense signal throughout the deep and superficial soft tissues, but no evidence of a viable fluid collection. There is no significant marrow signal abnormality or erosion identified in the ankle. Intramuscular and subcutaneous edema signal is present diffusely. No drainable fluid collection. 1.  Status post partial 5th ray amputation. The remaining toe again demonstrates evidence of osteomyelitis in the proximal phalanx. Bony defect in the base of the proximal phalanx from operative debridement and/or infection.  2.  Multifocal mild marrow signal alteration as described in the mid foot, which is probably related to presence of the TMT degenerative arthrosis and/or regional hyperemia, but early osteomyelitis is not excluded. 3.  Postoperative changes in the soft tissues. No drainable fluid collections identified. Mri Ankle Left Wo Contrast    Result Date: 4/10/2021  MRI FOOT LEFT WO CONTRAST, MRI ANKLE LEFT WO CONTRAST Indication: Status post left foot incision and debridement and 5th ray resection. Comparison: Radiographs 4/7/21 Technique: Multiplanar multisequence MR imaging of the left foot and left ankle performed without intravenous contrast. Findings: Status post partial amputation of the 5th ray with absence of the metatarsal. The 5th toe remains. There is evidence of osteomyelitis within the 5th proximal phalanx as shown on previous radiographs. Bony defect in the base of the proximal phalanx from interval operative treatment or the ongoing infection T2 hyperintense marrow signal alteration within the base and proximal shaft of the 4th metatarsal and the 3rd metatarsal and to a minimal degree in the 2nd metatarsal base. Mild signal changes within the cuboid, lateral and middle cuneiforms. The signal on T1-weighted images only mildly hypointense and this can be seen with reactive edema from the TMT joint arthropathy and/or hyperemia, although early osteomyelitis be difficult to exclude. Postoperative in the soft tissues in the lateral foot. Adjacent susceptibility from soft tissue gas likely postsurgical. There is diffuse T2 hyperintense signal throughout the deep and superficial soft tissues, but no evidence of a viable fluid collection. There is no significant marrow signal abnormality or erosion identified in the ankle. Intramuscular and subcutaneous edema signal is present diffusely. No drainable fluid collection. 1.  Status post partial 5th ray amputation. The remaining toe again demonstrates evidence of osteomyelitis in the proximal phalanx.  Bony defect in the base of the proximal phalanx from operative debridement and/or infection. 2.  Multifocal mild marrow signal alteration as described in the mid foot, which is probably related to presence of the TMT degenerative arthrosis and/or regional hyperemia, but early osteomyelitis is not excluded. 3.  Postoperative changes in the soft tissues. No drainable fluid collections identified. Vl Dup Lower Extremity Arteries Bilateral    Result Date: 4/9/2021  Lower Extremities Arterial Duplex  Demographics   Patient Name       Kelvin KENYON   Date of Study      04/08/2021        Gender              Female   Patient Number     [id number]        Date of Birth       1945   Visit Number       [id number]         Age                 76 year(s)   Accession Number   [id number]        Room Number         8333   Corporate ID       [id number]          Lisa Agrawal RVT,                                                           [facility]   Ordering Physician Tamsen Holter MD, [facility]                                       Physician           Readers                                                           Connie Chavez MD  Procedure Type of Study:   Extremities Arteries:Lower Extremities Arterial Duplex, VL LOWER EXTREMITY  ARTERIES DUPLEX BILATERAL. Vascular Sonographer Report  Indications for Study:Ulcer/gangrene. Additional Indications:Patient presents for evaluation due to abnormal pulses bilaterally and S/P I&D with left 5th ray resection due to osteomyelitis and gas gangrene on 4/7/21. She also is S/P partial right 5th ray amputation on 10/30/20. Arterial Duplex Scan: Grayscale and color imaging of the extremity arteries, including Doppler spectral waveform analysis. Impressions Right Impression The right ankle/brachial index is 0.96 (PT-144 mmHg; DP-136 mmHg). There is normal, multiphasic flow identified in the common femoral artery, suggesting no evidence of significant aorto-iliac inflow disease.  There is atherosclerotic plaque involving the femoro-popliteal level with no significantly elevated velocities, consistent with <50% stenosis. Limited visualization in the calf due to thickened skin that could not be penetrated with ultrasound from the mid to distal calf. Left Impression The left ankle/brachial index is 0.96 (PT-144 mmHg; DP-130 mmHg). There is normal, multiphasic flow identified in the common femoral artery, suggesting no evidence of significant aorto-iliac inflow disease. There is atherosclerotic plaque involving the femoro-popliteal level with no significantly elevated velocities, consistent with <50% stenosis. Monophasic flow is demonstrated throughout the left lower extremity likely due to hyperemic state. Limited visualization in the distal calf and ankle area due to wounds/dressings. There are no previous studies for comparison. Conclusions   Summary   The ankle/brachial index is within normal range bilaterally. There is mild atherosclerotic disease noted in the bilateral  femoral-popliteal segments. The tibial arteries could not be completely evaluated due to poor  penetration of the thickened soft tissue and dressings. Signature   ------------------------------------------------------------------  Electronically signed by Nasreen Suresh MD (Interpreting  physician) on 04/09/2021 at 09:21 AM  ------------------------------------------------------------------  Blood Pressure:Right arm 150/ mmHg. Left arm 140/ mmHg. Patient Status:Routine. Study [facility]. Technical Quality:Limited visualization due to poor acoustical window. Risk Factors History +---------+----+-------------------------------------------------------------+ ! Diagnosis! Date! Comments                                                     ! +---------+----+-------------------------------------------------------------+ ! Other    !     !H/O osteomyelitis, gas gangrene, angioedema, varicose veins  ! !         !    !and venous stasis disease, neuropathy, PE                    ! +---------+----+-------------------------------------------------------------+   - The patient's risk factor(s) include: diabetes mellitus, dyslipidemia,     obesity and arterial hypertension.   - The patient has a former tobacco history of 9 years . Velocities are measured in cm/s ; Diameters are measured in mm LE Duplex Measurements +-------------------++-----+-----+-----------++---+-----+-----------+ ! ! !Right! ! Left       !!   !     !           ! +-------------------++-----+-----+-----------++---+-----+-----------+ ! Location           ! !PSV  ! Ratio! Wave Desc. !!PSV! Ratio! Wave Desc. ! +-------------------++-----+-----+-----------++---+-----+-----------+ ! Prox Common Femoral!!132  ! ! Triphasic  !!152! ! Triphasic  ! +-------------------++-----+-----+-----------++---+-----+-----------+ ! Dist Common Femoral!!104  ! ! Triphasic  ! !114! ! Triphasic  ! +-------------------++-----+-----+-----------++---+-----+-----------+ ! PFA                !!132  ! ! Multiphasic! !113! ! Multiphasic! +-------------------++-----+-----+-----------++---+-----+-----------+ ! Prox SFA           !!107  !1.03 ! Multiphasic!!120!1.05 ! Multiphasic! +-------------------++-----+-----+-----------++---+-----+-----------+ ! Mid SFA            !!135  !1.26 ! Multiphasic!!176!1.47 ! Monophasic ! +-------------------++-----+-----+-----------++---+-----+-----------+ ! Dist SFA           !!135  !1    ! Multiphasic!!180!1.02 ! Monophasic ! +-------------------++-----+-----+-----------++---+-----+-----------+ ! Popliteal          !!177  ! ! Multiphasic!!197! ! Monophasic ! +-------------------++-----+-----+-----------++---+-----+-----------+ ! Dist Popliteal     !!134  !0.99 ! Multiphasic!!153!0.85 ! Monophasic ! +-------------------++-----+-----+-----------++---+-----+-----------+ ! Prox PTA           !!88   !0.66 ! Multiphasic!!   !     !           ! +-------------------++-----+-----+-----------++---+-----+-----------+ ! Mid PTA            !!     !     !           !!171!1.12 ! Monophasic ! +-------------------++-----+-----+-----------++---+-----+-----------+ ! Dist PTA           !!116  !1.32 ! Multiphasic!!169!0.99 ! Monophasic ! +-------------------++-----+-----+-----------++---+-----+-----------+ ! Dist DENISSE           !!69.3 !0.52 ! Monophasic !!124!0.81 ! Monophasic ! +-------------------++-----+-----+-----------++---+-----+-----------+ ! Prox Peroneal      !!93   !1.06 ! Monophasic !!   !     !           ! +-------------------++-----+-----+-----------++---+-----+-----------+ ! Mid Peroneal       !!     !     !           !!109!0.71 ! Monophasic ! +-------------------++-----+-----+-----------++---+-----+-----------+            Treatments: As above. Discharge Medications:     Medication List      START taking these medications    metroNIDAZOLE 500 MG tablet  Commonly known as: FLAGYL  Take 1 tablet by mouth every 8 hours        CHANGE how you take these medications    insulin  UNIT/ML injection pen  Commonly known as: HUMULIN N;NOVOLIN N  Inject 30 Units into the skin 2 times daily (before meals)  What changed:   · how much to take  · how to take this  · when to take this  · additional instructions     insulin regular 100 UNIT/ML injection  Commonly known as: HumuLIN R  INJECT 45 UNITS INTO THE ARM UNDER THE SKIN AT LUNCH AND THEN INJECT 25 UNITS UNDER THE SKIN AT BEDTIME  What changed: additional instructions        CONTINUE taking these medications    acetaminophen 500 MG tablet  Commonly known as: TYLENOL     B-D ULTRAFINE III SHORT PEN 31G X 8 MM Misc  Generic drug: Insulin Pen Needle  USE WITH INSULIN TWO TIMES A DAY AS DIRECTED     * blood glucose test strips  Test 3-4 times a day & as needed for symptoms of irregular blood glucose. DX: E11.9.  Supply the strips that go with her meter and approved by insurance     * OneTouch Ultra strip  Generic drug: blood hours as needed for Pain for up to 3 days. Ask about: Should I take this medication? Where to Get Your Medications      These medications were sent to [facility] [address], [address] [id number] [facility],[address], [place] [address]    Phone: [phone]   · insulin  UNIT/ML injection pen  · metroNIDAZOLE 500 MG tablet     You can get these medications from any pharmacy    Bring a paper prescription for each of these medications  · oxyCODONE HCl 10 MG immediate release tablet         Activity: activity as tolerated  Diet: No diet orders on file      Disposition: SNF  Discharged Condition: Stable  Follow Up:   [address] [place] [address]  [address] [address]  [phone]  Schedule an appointment as soon as possible for a visit in 1 week  For wound re-check with Dr. Lanie Parr, [address]  [phone]              Code status:  Prior         Total time spent on discharge, finalizing medications, referrals and arranging outpatient follow up was more than 45 minutes      Thank you Dr. Luann Juan MD for the opportunity to be involved in this patients care.

## 2021-04-14 NOTE — PROGRESS NOTES
Podiatric Surgery Daily Progress Note  Ita Becker      Subjective :   Patient seen and examined this am at the bedside. Patient denies any acute overnight events. Patient denies N/V/F/C/SOB. Patient denies calf pain, thigh pain, chest pain. Review of Systems: A 12 point review of symptoms is unremarkable with the exception of the chief complaint. Patient specifically denies nausea, fever, vomiting, chills, shortness of breath, chest pain, abdominal pain, constipation or difficulty urinating. Objective     BP (!) 147/75   Pulse 74   Temp 97.4 °F (36.3 °C) (Oral)   Resp 16   Ht 5' 4\" (1.626 m)   Wt 256 lb (116.1 kg)   LMP  (LMP Unknown)   SpO2 94%   BMI 43.94 kg/m²     I/O:    Intake/Output Summary (Last 24 hours) at 4/14/2021 0758  Last data filed at 4/14/2021 0558  Gross per 24 hour   Intake 720 ml   Output 775 ml   Net -55 ml              Wt Readings from Last 3 Encounters:   04/07/21 256 lb (116.1 kg)   12/29/20 257 lb (116.6 kg)   12/22/20 157 lb (71.2 kg)       LABS:    Recent Labs     04/13/21  0532 04/14/21  0536   WBC 7.2 6.5   HGB 7.8* 8.4*   HCT 25.0* 26.5*    410        Recent Labs     04/14/21  0536      K 4.3      CO2 28   BUN 21*   CREATININE 1.1        Recent Labs     04/11/21  0925   INR 1.17*           LOWER EXTREMITY EXAMINATION    Dressing to bilateral LE intact. No strikethrough noted to the external dressing. Minimal serous drainage noted to internal layers of b/l dressing. Wound VAC noted to have excellent seal, set to 125 mmHg with approximately 20 mL of sanguinous drainage noted in canister. VASCULAR: DP and PT pulses nonpalpable b/l. Upon Doppler examination, DP signals monophasic and PT signals biphasic b/l. CFT is sluggish to the digits of the foot b/l. Skin temperature warm to cool b/l.  +1 pitting edema noted b/l. No pain with calf compression b/l.     NEUROLOGIC: Gross and epicritic sensation is diminished b/l.  Protective sensation is and/or hyperemia, although early osteomyelitis be difficult to exclude.       Postoperative in the soft tissues in the lateral foot. Adjacent susceptibility from soft tissue gas likely postsurgical. There is diffuse T2 hyperintense signal throughout the deep and superficial soft tissues, but no evidence of a viable fluid    collection.       There is no significant marrow signal abnormality or erosion identified in the ankle. Intramuscular and subcutaneous edema signal is present diffusely. No drainable fluid collection.           Impression       1.  Status post partial 5th ray amputation. The remaining toe again demonstrates evidence of osteomyelitis in the proximal phalanx. Bony defect in the base of the proximal phalanx from operative debridement and/or infection. 2.  Multifocal mild marrow signal alteration as described in the mid foot, which is probably related to presence of the TMT degenerative arthrosis and/or regional hyperemia, but early osteomyelitis is not excluded. 3.  Postoperative changes in the soft tissues. No drainable fluid collections identified. Xray left foot 4/12/2021 - post-op  Narrative   Clinical history: Status post fifth digit amputation.       Comparisons: 4/7/2021.       FINDINGS:       4 projections of the left foot. Patient is status post amputation of the small toe since the prior examination. The fifth metatarsal head already been previously amputated. Expected postoperative soft tissue swelling along the lateral left foot.           Impression   1. Status post left small toe amputation.      Xray left foot 4/7/2021  Narrative       Patient: Porter Vizcarra  Time Out: 01:11   Exam(s): FILM LEFT FOOT        EXAM:     XR Left Foot Complete, 3 or More Views       CLINICAL HISTORY:     ulcer, infection.       TECHNIQUE:     Frontal, lateral and oblique views of the left foot.       COMPARISON:     No relevant prior studies available.       FINDINGS:     Bones/joints:  Lucencies in 5th metatarsal and 5th proximal phalanx,    concerning for osteomyelitis.  Cannot exclude superimposed fracture.  No    dislocation.     Soft tissues:  Soft tissue swelling.  Lateral foot soft tissue air.  No    radiopaque foreign body.           Electronically signed by Kellie Buckner M.D. on 04-07-21 at 0111       Impression   1.  Lucencies in 5th metatarsal and 5th proximal phalanx, concerning for    osteomyelitis.  Cannot exclude superimposed fracture. 2.  Soft tissue swelling.  Lateral foot soft tissue air.             Arterial duplex 4/8/2021 -preliminary  Tech Comments   Right   The right ankle/brachial index is 0.96 (PT-144 mmHg; DP-136 mmHg). There is normal, multiphasic flow identified in the common femoral artery,   suggesting no evidence of significant aorto-iliac inflow disease. There is atherosclerotic plaque involving the femoro-popliteal level with no   significantly elevated velocities, consistent with <50% stenosis. Limited visualization in the calf due to thickened skin that could not be   penetrated with ultrasound from the mid to distal calf. Left   The left ankle/brachial index is 0.96 (PT-144 mmHg; DP-130 mmHg). There is normal, multiphasic flow identified in the common femoral artery,   suggesting no evidence of significant aorto-iliac inflow disease. There is atherosclerotic plaque involving the femoro-popliteal level with no   significantly elevated velocities, consistent with <50% stenosis. Monophasic   flow is demonstrated throughout the left lower extremity likely due to   hyperemic state. Limited visualization in the distal calf and ankle area due to   wounds/dressings. There are no previous studies for comparison.          ASSESSMENT/PLAN  -S/P Left foot I&D, 5th metatarsal resection, packed open (DOS: 4/7/21) 2/2 Diabetic foot ulceration, OM and gas gangrene, left foot, Earl 3  -S/P Left foot I&D, 5th digit amputation, wound debridement, wound vac application (DOS: 4/12/21)  -Osteomyelitis; 4th metatarsal, 5th metatarsal, 5th proximal phalanx, left lower extremity  -Cellulitis, left lower extremity; improving  -Peripheral arterial disease, bilateral lower extremity  -Peripheral vascular disease, bilateral lower extremity  -Diabetes mellitus type 2 with peripheral neuropathy    -Patient seen and evaluated at the bedside this AM.  -Hypertensive otherwise, VSS, no leukocytosis noted (WBC 6.5)  -.8. ESR greater than 120.  -Wound culture: Group B strep, Providencia stuartii, Morganella morganii, corynebacterium striatum, bacteroides fragilis  -Surgical pathology 5th metatarsal; acute and chronic osteomyelitis  -Surgical pathology of 5th digit; acute inflammation and necrosis of soft tissue, negative for osteomyelitis  -Blood culture x2- NGTD  -X-rays reviewed and impressions noted above. -Arterial duplex reviewed and impression noted above. -MRI reviewed, impression noted above; marrow signal alteration noted in midfoot, specifically proximal fourth metatarsal.  Given close proximity to wound, and the Intra-Op appearance of the fifth metatarsal, higher concern for osteomyelitis and will plan to treat accordingly. Discussed with ID, will receive 6 weeks of IV antibiotics.  -Wound VAC reapplied to left lower extremity, set to continuous 150 mmHg. -Dressing to bilateral lower extremities consisting of Adaptic, gauze, Kerlix, Ace bandage.  -Lac-Hydrin 12% lotion ordered to room, to be applied by podiatry at dressing changes. -ID following, continue antibiotics per their recommendations; currently on ceftriaxone, flagyl  -Social work following for discharge planning and likely SNF placement.  -Wound VAC paperwork completed, and patient's chart  -PT/OT following.  -Nonweightbearing left lower extremity.  -Patient will need to follow-up with Dr. Mohit Logan in the [facility] the wound care center within 1 week of discharge.     DISPO:S/P Left foot I&D with 5th metatarsal resection (4/7/12), I&D with 5th digit amputation (4/12/21). Continue IV antibiotics per ID recommendations. Patient will need wound VAC at discharge, paperwork in chart. No further surgical intervention from podiatric standpoint. Patient is OK to discharge from podiatry standpoint when deemed medically stable. Patient examined and evaluated at bedside with Erika Figueroa [facility] mckeon  Podiatric Resident, PGY-1  Pager #: [phone] or Perfect Serve    Patient was seen and evaluated at bedside. Agree with residents assessment and treatment plan.   Frantz Aguilar DPM

## 2021-04-14 NOTE — PROGRESS NOTES
Occupational Therapy  Facility/Department: [facility] 5T ORTHO/NEURO  Daily Treatment Note  NAME: Margarita Bernard  :   MRN: [id number]    Date of Service: 2021    Discharge Recommendations:  Margarita Bernard scored a 13/24 on the AM-PAC ADL Inpatient form. Current research shows that an AM-PAC score of 17 or less is typically not associated with a discharge to the patient's home setting. Based on the patient's AM-PAC score and their current ADL deficits, it is recommended that the patient have 3-5 sessions per week of Occupational Therapy at d/c to increase the patient's independence. Please see assessment section for further patient specific details. If patient discharges prior to next session this note will serve as a discharge summary. Please see below for the latest assessment towards goals. OT Equipment Recommendations  Other: defer to next level of care    Assessment   Performance deficits / Impairments: Decreased functional mobility ; Decreased ADL status; Decreased endurance;Decreased ROM; Decreased strength  Assessment: Pt still dep with LE ADLs, and min-mod A with UE ADLs. She still needs cues for L LE NWB during stance and transfers. Pt did demonstrate increased indep with functional transfer bed to simulated 3 in1 commode. Recommend ongoing inpatient OT to maximize function. Treatment Diagnosis: impaired ADLs/transfers 2* NWB status LLE  Prognosis: Good  Patient Education: educated about NWB status - states understanding, but decreased abiltiy to maintain (would benefit from continued education and reinforcement)  Activity Tolerance  Activity Tolerance: Patient Tolerated treatment well;Patient limited by fatigue  Safety Devices  Safety Devices in place: Yes  Type of devices: Nurse notified; Left in chair;Chair alarm in place;Call light within reach         Patient Diagnosis(es): The primary encounter diagnosis was Other acute osteomyelitis of left foot ([facility] [address]  Diagnoses of Sepsis due to cellulitis ([facility] [address] and Diabetic ulcer of left foot associated with other specified diabetes mellitus, unspecified part of foot, unspecified ulcer stage ([address] were also pertinent to this visit. has a past medical history of Acute respiratory failure with hypoxia ([facility] [address] Hx of blood clots, Hyperlipidemia, Hypertension, Mallet finger, PONV (postoperative nausea and vomiting), and Type II or unspecified type diabetes mellitus without mention of complication, not stated as uncontrolled. has a past surgical history that includes Breast biopsy (Right); Toe amputation (Right, 10/30/2020); Foot Debridement (Left, 4/7/2021); Foot surgery (Left, 04/12/2021); and Foot Debridement (Left, 4/12/2021). Restrictions  Position Activity Restriction  Other position/activity restrictions: NWB left LE, Patient must remain on bedrest until motor and sensory function is back to baseline. Must be assisted the first time the patient is ambulated. Subjective   General  Chart Reviewed: Yes  Patient assessed for rehabilitation services?: Yes  Additional Pertinent Hx: 76 y.o. F to who presents to ED with left foot pain for the last couple of days. Hospital Course: OR 4/7 for LEFT LOWER EXTREMITY FOOT DEBRIDEMENT INCISION AND DRAINAGE WITH  5TH METATARSAL RESECTION (Left ). On 4/12 to OR for L foot I&D, wth 5th toe amp, wound vac placement  PMH: uncontrolled type 2 diabetes complicated by neuropathy comorbid obesity, hypertension, hyperlipidemia, chronic venous stasis. Family / Caregiver Present: No  Referring Practitioner: Dr. Perri Kim  Diagnosis: OM  Subjective  Subjective: Pt in bed, agreeable to work with OT. Orientation  Orientation  Overall Orientation Status: Within Functional Limits  Objective    ADL  Grooming:  Moderate assistance(combing and styling hair)  UE Bathing: Minimal assistance  LE Dressing: Dependent/Total(changing adult diaper to depend brief)  Toileting: Dependent/Total        Balance  Sitting Balance: Supervision  Standing Balance: Minimal assistance  Standing Balance  Time: ~2 minutes  Activity: static stance with walker    Toilet Transfers  Toilet - Technique: Stand pivot  Equipment Used: (simulated 3 in1 commode)  Toilet Transfer: 2 Person assistance(Ridge of 2, cues for L LE NWB- not adhering part of time)    Bed mobility  Supine to Sit: Stand by assistance(head of bed elevated)  Scooting: Stand by assistance    Transfers  Sit to stand: 2 Person assistance(1st trial Ridge of 2 progressing to mod A of 2, 2nd trial Ridge of 2-cues for L LE NWB)  Stand to sit: 2 Person assistance(Ridge of 2)                        Plan   Plan  Times per week: 2-5  Times per day: Daily  Current Treatment Recommendations: Strengthening, Balance Training, Functional Mobility Training, Endurance Training, Safety Education & Training, Self-Care / ADL  G-Code     OutComes Score                                                  AM-PAC Score        AM-[facility] Inpatient Daily Activity Raw Score: 13 (04/14/21 1358)  AM-PAC Inpatient ADL T-Scale Score : 32.03 (04/14/21 135)  ADL Inpatient CMS 0-100% Score: 63.03 (04/14/21 1358)  ADL Inpatient CMS G-Code Modifier : CL (04/14/21 [facility]8)    Goals  Short term goals  Time Frame for Short term goals: Discharge  Short term goal 1: simulated BSC transfer w/ Mod A, NWB LLE-4/14 goal not met  Short term goal 2: chair pushups x 5, NWB LLE to increase functional strength for transfers-4/13 goal not addressed  Short term goal 3: increase standing tolerance to 2 minutes, NWB LLE-4/14 goal not met, keep for consistency  Short term goal 4: new goal 4/13-pt to do simple grooming/hygiene tasks while seated, indep after set up-4/14 goal not met  Short term goal 5: new goal 4/13-pt to don briefs with max A-4/14 goal not met  Patient Goals   Patient goals : to be indep       Therapy Time   Individual Concurrent Group Co-treatment   Time In 1155         Time Out 1222         Minutes 27              Timed Code Treatment Minutes:   27    Total Treatment Minutes:  1108 Fahad Soliman, OTR/L 0899

## 2021-04-15 LAB
ALBUMIN SERPL-MCNC: 2.4 G/DL
ALP BLD-CCNC: 88 U/L
ALT SERPL-CCNC: 17 U/L
ANION GAP SERPL CALCULATED.3IONS-SCNC: 0.7 MMOL/L
AST SERPL-CCNC: 17 U/L
BASOPHILS ABSOLUTE: 0.1 /ΜL
BASOPHILS RELATIVE PERCENT: 1 %
BILIRUB SERPL-MCNC: 0.2 MG/DL (ref 0.1–1.4)
BUN BLDV-MCNC: 24 MG/DL
C-REACTIVE PROTEIN: 21.7
CALCIUM SERPL-MCNC: 8.4 MG/DL
CHLORIDE BLD-SCNC: 104 MMOL/L
CO2: 28 MMOL/L
CREAT SERPL-MCNC: 1.1 MG/DL
EOSINOPHILS ABSOLUTE: 0.2 /ΜL
EOSINOPHILS RELATIVE PERCENT: 4.6 %
GFR CALCULATED: NORMAL
GLUCOSE BLD-MCNC: 154 MG/DL
HCT VFR BLD CALC: 25.3 % (ref 36–46)
HEMOGLOBIN: 8 G/DL (ref 12–16)
LYMPHOCYTES ABSOLUTE: 1.1 /ΜL
LYMPHOCYTES RELATIVE PERCENT: 20.5 %
MCH RBC QN AUTO: 25.6 PG
MCHC RBC AUTO-ENTMCNC: 31.8 G/DL
MCV RBC AUTO: 80.6 FL
MONOCYTES ABSOLUTE: 0.6 /ΜL
MONOCYTES RELATIVE PERCENT: 10.3 %
NEUTROPHILS ABSOLUTE: 3.4 /ΜL
NEUTROPHILS RELATIVE PERCENT: 63.6 %
PLATELET # BLD: 432 K/ΜL
PMV BLD AUTO: 8.2 FL
POTASSIUM SERPL-SCNC: 4.5 MMOL/L
RBC # BLD: 3.14 10^6/ΜL
SEDIMENTATION RATE, ERYTHROCYTE: 12
SODIUM BLD-SCNC: 138 MMOL/L
TOTAL PROTEIN: 5.9
WBC # BLD: 5.4 10^3/ML

## 2021-04-17 LAB
ANAEROBIC CULTURE: ABNORMAL
GRAM STAIN RESULT: ABNORMAL
ORGANISM: ABNORMAL
WOUND/ABSCESS: ABNORMAL
WOUND/ABSCESS: ABNORMAL

## 2021-04-19 LAB
ALBUMIN SERPL-MCNC: 2.3 G/DL
ALBUMIN SERPL-MCNC: 2.3 G/DL
ALP BLD-CCNC: 66 U/L
ALP BLD-CCNC: 66 U/L
ALT SERPL-CCNC: 14 U/L
ALT SERPL-CCNC: 14 U/L
ANION GAP SERPL CALCULATED.3IONS-SCNC: NORMAL MMOL/L
ANION GAP SERPL CALCULATED.3IONS-SCNC: NORMAL MMOL/L
AST SERPL-CCNC: 15 U/L
AST SERPL-CCNC: 15 U/L
BASOPHILS ABSOLUTE: 0.1 /ΜL
BASOPHILS RELATIVE PERCENT: 1.3 %
BILIRUB SERPL-MCNC: 0.2 MG/DL (ref 0.1–1.4)
BILIRUB SERPL-MCNC: 0.2 MG/DL (ref 0.1–1.4)
BUN BLDV-MCNC: 18 MG/DL
BUN BLDV-MCNC: 18 MG/DL
C-REACTIVE PROTEIN: 6.5
CALCIUM SERPL-MCNC: 7.1 MG/DL
CALCIUM SERPL-MCNC: 7.1 MG/DL
CHLORIDE BLD-SCNC: 112 MMOL/L
CHLORIDE BLD-SCNC: 112 MMOL/L
CO2: 25 MMOL/L
CO2: 25 MMOL/L
CREAT SERPL-MCNC: 1 MG/DL
CREAT SERPL-MCNC: 1 MG/DL
EOSINOPHILS ABSOLUTE: 0.2 /ΜL
EOSINOPHILS RELATIVE PERCENT: 4.8 %
GFR CALCULATED: NORMAL
GFR CALCULATED: NORMAL
GLUCOSE BLD-MCNC: 56 MG/DL
GLUCOSE BLD-MCNC: 56 MG/DL
HCT VFR BLD CALC: 25.7 % (ref 36–46)
HEMOGLOBIN: 8 G/DL (ref 12–16)
LYMPHOCYTES ABSOLUTE: 1.1 /ΜL
LYMPHOCYTES RELATIVE PERCENT: 23.1 %
MCH RBC QN AUTO: 26.4 PG
MCHC RBC AUTO-ENTMCNC: 31.3 G/DL
MCV RBC AUTO: 84.5 FL
MONOCYTES ABSOLUTE: 0.4 /ΜL
MONOCYTES RELATIVE PERCENT: 9.7 %
NEUTROPHILS ABSOLUTE: 2.8 /ΜL
NEUTROPHILS RELATIVE PERCENT: 61.1 %
PDW BLD-RTO: 22.8 %
PLATELET # BLD: 334 K/ΜL
PMV BLD AUTO: 8.2 FL
POTASSIUM SERPL-SCNC: 3.9 MMOL/L
POTASSIUM SERPL-SCNC: 3.9 MMOL/L
RBC # BLD: 3.04 10^6/ΜL
SODIUM BLD-SCNC: 145 MMOL/L
SODIUM BLD-SCNC: 145 MMOL/L
TOTAL PROTEIN: 5.2
TOTAL PROTEIN: 5.2
WBC # BLD: 4.6 10^3/ML

## 2021-04-26 LAB
ALBUMIN SERPL-MCNC: 2.8 G/DL
ALP BLD-CCNC: 69 U/L
ALT SERPL-CCNC: 12 U/L
ANION GAP SERPL CALCULATED.3IONS-SCNC: NORMAL MMOL/L
AST SERPL-CCNC: 15 U/L
BASOPHILS ABSOLUTE: 0.1 /ΜL
BASOPHILS RELATIVE PERCENT: 1.6 %
BILIRUB SERPL-MCNC: 0.3 MG/DL (ref 0.1–1.4)
BUN BLDV-MCNC: 16 MG/DL
BUN BLDV-MCNC: 16 MG/DL
C-REACTIVE PROTEIN: 4.6
CALCIUM SERPL-MCNC: 8.1 MG/DL
CALCIUM SERPL-MCNC: 8.1 MG/DL
CHLORIDE BLD-SCNC: 105 MMOL/L
CHLORIDE BLD-SCNC: 105 MMOL/L
CO2: 28 MMOL/L
CO2: 28 MMOL/L
CREAT SERPL-MCNC: 1 MG/DL
CREAT SERPL-MCNC: 1 MG/DL
EOSINOPHILS ABSOLUTE: 0.3 /ΜL
EOSINOPHILS RELATIVE PERCENT: 6.2 %
GFR CALCULATED: NORMAL
GFR CALCULATED: NORMAL
GLUCOSE BLD-MCNC: 130 MG/DL
GLUCOSE BLD-MCNC: 130 MG/DL
HCT VFR BLD CALC: 30.5 % (ref 36–46)
HEMOGLOBIN: 9.3 G/DL (ref 12–16)
LYMPHOCYTES ABSOLUTE: 1.1 /ΜL
LYMPHOCYTES RELATIVE PERCENT: 26.6 %
MCH RBC QN AUTO: 26.3 PG
MCHC RBC AUTO-ENTMCNC: 30.5 G/DL
MCV RBC AUTO: 86.1 FL
MONOCYTES ABSOLUTE: 0.4 /ΜL
MONOCYTES RELATIVE PERCENT: 10.6 %
NEUTROPHILS ABSOLUTE: 2.3 /ΜL
NEUTROPHILS RELATIVE PERCENT: 55 %
PDW BLD-RTO: 26.4 %
PLATELET # BLD: 243 K/ΜL
PMV BLD AUTO: 8.4 FL
POTASSIUM SERPL-SCNC: 4.4 MMOL/L
POTASSIUM SERPL-SCNC: 4.4 MMOL/L
RBC # BLD: 3.54 10^6/ΜL
SODIUM BLD-SCNC: 139 MMOL/L
SODIUM BLD-SCNC: 139 MMOL/L
TOTAL PROTEIN: 6.1
WBC # BLD: 4.2 10^3/ML

## 2021-04-27 ENCOUNTER — HOSPITAL ENCOUNTER (OUTPATIENT)
Dept: WOUND CARE | Age: 76
Discharge: HOME | End: 2021-04-27
Payer: MEDICARE

## 2021-04-27 VITALS
TEMPERATURE: 97.6 F | DIASTOLIC BLOOD PRESSURE: 68 MMHG | SYSTOLIC BLOOD PRESSURE: 172 MMHG | RESPIRATION RATE: 20 BRPM | HEART RATE: 97 BPM

## 2021-04-27 DIAGNOSIS — L97.211 VARICOSE VEINS OF RIGHT LOWER EXTREMITY WITH INFLAMMATION, WITH ULCER OF CALF LIMITED TO BREAKDOWN OF SKIN (HCC): ICD-10-CM

## 2021-04-27 DIAGNOSIS — I83.212 VARICOSE VEINS OF RIGHT LOWER EXTREMITY WITH INFLAMMATION, WITH ULCER OF CALF LIMITED TO BREAKDOWN OF SKIN (HCC): ICD-10-CM

## 2021-04-27 PROCEDURE — 11042 DBRDMT SUBQ TIS 1ST 20SQCM/<: CPT

## 2021-04-27 PROCEDURE — 99213 OFFICE O/P EST LOW 20 MIN: CPT

## 2021-04-27 PROCEDURE — 29581 APPL MULTLAYER CMPRN SYS LEG: CPT

## 2021-04-27 RX ORDER — GLUCOSAMINE HCL 500 MG
TABLET ORAL
COMMUNITY

## 2021-04-27 RX ORDER — AMMONIUM LACTATE 12 G/100G
LOTION TOPICAL PRN
COMMUNITY

## 2021-04-27 RX ORDER — CEFTRIAXONE 2 G/1
2000 INJECTION, POWDER, FOR SOLUTION INTRAMUSCULAR; INTRAVENOUS EVERY 24 HOURS
COMMUNITY
End: 2021-05-25

## 2021-04-27 ASSESSMENT — PAIN SCALES - GENERAL: PAINLEVEL_OUTOF10: 7

## 2021-04-27 ASSESSMENT — PAIN DESCRIPTION - DESCRIPTORS: DESCRIPTORS: ACHING

## 2021-04-27 ASSESSMENT — PAIN DESCRIPTION - PROGRESSION: CLINICAL_PROGRESSION: NOT CHANGED

## 2021-04-27 ASSESSMENT — PAIN DESCRIPTION - LOCATION: LOCATION: HEAD

## 2021-04-27 NOTE — PROGRESS NOTES
[address]   Progress Note and Procedure Note      Prentice Ahumada  MEDICAL RECORD NUMBER:  [id number]  AGE: 76 y.o. GENDER: female  : 1945  EPISODE DATE:  2021    Subjective:     Chief Complaint   Patient presents with    Wound Check     initial         HISTORY of PRESENT ILLNESS HPI     Prentice Ahumada is a 76 y.o. female who presents today for wound/ulcer evaluation. History of Wound Context: patient presents today for follow up of a [facility] s/p I&D. She has been at an [facility] getting wound vac and IV abx therapy. She has been NWB as recommended. Wound/Ulcer Pain Timing/Severity: none  Quality of pain: N/A  Severity:  0 / 10   Modifying Factors: None  Associated Signs/Symptoms: none    Ulcer Identification:  Ulcer Type: diabetic and non-healing surgical    Contributing Factors: diabetes and poor glucose control    Acute Wound: N/A    PAST MEDICAL HISTORY        Diagnosis Date    Acute respiratory failure with hypoxia ([address] 2019    Hx of blood clots 2019    PE    Hyperlipidemia     Hypertension     Mallet finger 3/4/2015    PONV (postoperative nausea and vomiting)     Type II or unspecified type diabetes mellitus without mention of complication, not stated as uncontrolled        PAST SURGICAL HISTORY    Past Surgical History:   Procedure Laterality Date    BREAST BIOPSY Right     FOOT DEBRIDEMENT Left 2021    LEFT LOWER EXTREMITY FOOT DEBRIDEMENT INCISION AND DRAINAGE WITH  5TH METATARSAL RESECTION performed by Fred Cherry DPM at [address] Left 2021    LEFT LOWER EXTREMITY EXCISIONAL [address] 11X4 CM WOUND; LEFT FIFTH TOE AMPUTATION, WOUND VAC APPLICAITON performed by Neil Londono DPM at [address] [place] [address] Left 2021    LEFT LOWER EXTREMITY: INCISION AND DRAINAGE.  EXCISIONAL DEBRIDEMENT OF MUSCLE AND FASCIA [id number] WOUND; LEFT FIFTH TOE AMPUTATION, WOUND VAC APPLICATION    TOE AMPUTATION Right 10/30/2020    PARTIAL AMPUTATION 5TH RAY RIGHT FOOT performed by Maykel Noel DPM at [address] [place] [address]    Family History   Problem Relation Age of Onset    Diabetes Father     Heart Disease Father     COPD Sister     Alzheimer's Disease Mother     Cancer Brother         lungs    Diabetes Sister     Stroke Sister     Cancer Sister         anal       SOCIAL HISTORY    Social History     Tobacco Use    Smoking status: Former Smoker     Packs/day: 0.25     Years: 38.00     Pack years: 9.50     Types: Cigarettes     Start date: 10/22/1958     Quit date: 1980     Years since quittin.3    Smokeless tobacco: Never Used   Substance Use Topics    Alcohol use:  Yes     Alcohol/week: 1.0 standard drinks     Types: 1 Cans of beer per week     Comment: occasional     Drug use: Never       ALLERGIES    Allergies   Allergen Reactions    Latex Rash    Adhesive Tape     Aleve [Naproxen Sodium]      Possible angioedema    Cozaar [Losartan Potassium]      angioedema    Iodine     Lasix [Furosemide] Swelling    Lisinopril      Cough and sore throat     Metformin And Related      Possible angioedema    Nifedipine      swells    Norvasc [Amlodipine Besylate]      Leg swelling    Novolog [Insulin Aspart]      dizzyness    Pioglitazone      edema    Zoloft [Sertraline Hcl]      Vision loss       MEDICATIONS    Current Outpatient Medications on File Prior to Encounter   Medication Sig Dispense Refill    cefTRIAXone (ROCEPHIN) 2 g injection Inject 2,000 mg into the muscle every 24 hours Until 2021      metroNIDAZOLE (FLAGYL) 500 MG tablet Take 1 tablet by mouth every 8 hours 105 tablet 0    simvastatin (ZOCOR) 20 MG tablet TAKE ONE TABLET BY MOUTH ONCE NIGHTLY 90 tablet 0    labetalol (NORMODYNE) 100 MG tablet TAKE ONE TABLET BY MOUTH TWICE A  tablet 0    acetaminophen (TYLENOL) 500 MG tablet Take 500 mg by mouth every 6 hours as needed for Pain      insulin NPH (HUMULIN N; NOVOLIN N) 100 UNIT/ML injection pen Inject 30 Units into the skin 2 times daily (before meals) 5 pen 3    ONETOUCH ULTRA strip USE ONE STRIP TO TEST TWICE A DAY AS NEEDED 100 strip 2    PROMOGRAN WOUND DRESSING MATRIX Apply topically Trim product to match the size of the wound. Apply topically. Moisten lightly with saline. Cover with dry sterile dressing. 1 Package 2    SODIUM CHLORIDE, EXTERNAL, (SALINE WOUND [address]) 0.9 % SOLN Moisten Promogran wound dressing with saline with each application. 1 Can 0    vitamin B-12 (CYANOCOBALAMIN) 100 MCG tablet Take 50 mcg by mouth daily      insulin regular (HUMULIN R) 100 UNIT/ML injection INJECT 45 UNITS INTO THE ARM UNDER THE SKIN AT LUNCH AND THEN INJECT 25 UNITS UNDER THE SKIN AT BEDTIME (Patient taking differently: Sliding scale) 1 vial 2    Handicap Placard MISC by Does not apply route Duration 5 years 1 each 0    Insulin Syringe-Needle U-100 (BD INSULIN SYRINGE U/F) 31G X 5/16\" 0.5 ML MISC USE ONE SYRINGE FOUR TIMES A  each 5    ONETOUCH DELICA LANCETS 31N MISC USE TO TEST BLOOD SUGAR THREE TIMES A  each 0    Blood Pressure KIT Apply 1 applicator topically 2 times daily 1 kit 0    blood glucose monitor strips Test 3-4 times a day & as needed for symptoms of irregular blood glucose. DX: E11.9. Supply the strips that go with her meter and approved by insurance 300 strip 1    Lancets MISC 1 each by Does not apply route daily Test 3-4 times a day & as needed for symptoms of irregular blood glucose. DX: E11.9.  Supply the lancets that go with her meter and approved by insurance 300 each 3    Continuous Blood Gluc Sensor (FREESTYLE VÍCTOR 14 DAY SENSOR) MISC CHECK GLUCOSE 4 TIMES DAILY 2 each 2    B-D ULTRAFINE III SHORT PEN 31G X 8 MM MISC USE WITH INSULIN TWO TIMES A DAY AS DIRECTED 100 each 2    glucose monitoring kit (FREESTYLE) monitoring kit Use a ONE TOUCH 1 kit 0    EPINEPHrine (EPIPEN 2-OSMAR) 0.3 MG/0.3ML SOAJ injection Inject 0.3 mLs into the muscle once for 1 dose. Use as directed for allergic reaction (Patient not taking: Reported on 1/22/2020) 0.3 mL 2    [DISCONTINUED] sitagliptan (JANUVIA) 100 MG tablet Take 1 tablet by mouth daily for 360 days. 30 tablet 3     No current facility-administered medications on file prior to encounter. REVIEW OF SYSTEMS  Review of Systems    Pertinent items are noted in HPI. Review of Systems: A 12 point review of symptoms is unremarkable with the exception of the chief complaint. Patient specifically denies nausea, fever, vomiting, chills, shortness of breath, chest pain, abdominal pain, constipation or difficulty urinating. Objective:      BP (!) 172/68   Pulse 97   Temp 97.6 °F (36.4 °C) (Temporal)   Resp 20   LMP  (LMP Unknown)     Wt Readings from Last 3 Encounters:   04/07/21 256 lb (116.1 kg)   12/29/20 257 lb (116.6 kg)   12/22/20 157 lb (71.2 kg)       PHYSICAL EXAM  Physical Exam    Ulceration noted on the lateral aspect of the left foot. Wound has fibrotic and nonviable tissue that extends down through and includes the subcutaneous tissue. After debridement the wound has a granular base. There is no surrounding erythema, edema, warmth or malodor noted. The wound does not probe or track to bone. The edges of the ulcer are slightly macerated. Edema on the left lower leg is well controlled with compression. Patient relates that she removed the compression on the right as it felt tight. 2+ pitting edema noted on the right lower extremity. Patient has brawny pigmentary changes bilaterally with hypertrophic skin changes noted. 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     Performed by: Mimi Camarena DPM    Consent obtained:  Yes    Time out taken:  Yes    Pain Control: Anesthetic  Anesthetic: 4% Lidocaine Liquid Topical       Debridement: Excisional Debridement    Using curette the wound(s)/ulcer(s) was/were debrided down through and including the removal of epidermis, dermis and subcutaneous tissue. Devitalized Tissue Debrided:  fibrin and slough    Pre Debridement Measurements:  Are located in the Wound/Ulcer Documentation Flow Sheet    Diabetic/Pressure/Non Pressure Ulcers only:  Ulcer: Diabetic ulcer, fat layer exposed     Wound/Ulcer #: 1    Post Debridement Measurements:  Wound/Ulcer Descriptions are Pre Debridement except measurements:    Negative Pressure Wound Therapy Foot Anterior; Left (Active)   Wound Type Diabetic foot ulcer 04/14/21 1037   Target Pressure (mmHg) 125 04/13/21 0300   Irrigation Solution Dakins 0.125% 04/11/21 1404   Canister changed? Yes 04/12/21 0749   Dressing Status Clean;Dry; Intact 04/14/21 1037   Dressing Changed Changed/New 04/12/21 0916   Drainage Amount None 04/12/21 0916   Output (ml) 50 ml 04/12/21 0524   Wound Assessment Other (Comment) 04/12/21 0916   Jody-wound Assessment Other (Comment) 04/12/21 0916   Number of days: 18       Wound 04/27/21 Foot Left;Lateral #1 (Active)   Dressing/Treatment Moist to dry 04/27/21 1521   Wound Length (cm) 8 cm 04/27/21 1419   Wound Width (cm) 1.5 cm 04/27/21 1419   Wound Depth (cm) 0.5 cm 04/27/21 1419   Wound Surface Area (cm^2) 12 cm^2 04/27/21 1419   Wound Volume (cm^3) 6 cm^3 04/27/21 1419   Post-Procedure Length (cm) 8.1 cm 04/27/21 1521   Post-Procedure Width (cm) 1.6 cm 04/27/21 1521   Post-Procedure Depth (cm) 0.7 cm 04/27/21 1521   Post-Procedure Surface Area (cm^2) 12.96 cm^2 04/27/21 1521   Post-Procedure Volume (cm^3) 9.07 cm^3 04/27/21 1521   Distance Tunneling (cm) 0 cm 04/27/21 1419   Undermining Maxium Distance (cm) 0 04/27/21 1419   Wound Assessment Fibrin;Pink/red 04/27/21 1419   Drainage Description Serosanguinous 04/27/21 1419   Odor None 04/27/21 1419   Jody-wound Assessment Maceration 04/27/21 1419   Margins Defined edges 04/27/21 1419   Wound Thickness Description not for Pressure Injury Full thickness 04/27/21 1419   Number of days: 0       Wound 04/27/21 Leg Right; Lower; Posterior #2 (Active)   Dressing/Treatment Collagen with Ag 04/27/21 1521   Wound Length (cm) 0.5 cm 04/27/21 1419   Wound Width (cm) 1 cm 04/27/21 1419   Wound Depth (cm) 0.1 cm 04/27/21 1419   Wound Surface Area (cm^2) 0.5 cm^2 04/27/21 1419   Wound Volume (cm^3) 0.05 cm^3 04/27/21 1419   Post-Procedure Length (cm) 0.6 cm 04/27/21 1521   Post-Procedure Width (cm) 1.1 cm 04/27/21 1521   Post-Procedure Depth (cm) 0.3 cm 04/27/21 1521   Post-Procedure Surface Area (cm^2) 0.66 cm^2 04/27/21 1521   Post-Procedure Volume (cm^3) 0.2 cm^3 04/27/21 1521   Distance Tunneling (cm) 0 cm 04/27/21 1419   Undermining Maxium Distance (cm) 0 04/27/21 1419   Wound Assessment Pink/red;Fibrin 04/27/21 1419   Drainage Amount Small 04/27/21 1419   Drainage Description Serosanguinous 04/27/21 1419   Odor None 04/27/21 1419   Jody-wound Assessment Intact 04/27/21 1419   Margins Defined edges 04/27/21 1419   Wound Thickness Description not for Pressure Injury Full thickness 04/27/21 1419   Number of days: 0     Incision Toe (Comment  which one) Anterior;Right (Active)   Number of days: Total Surface Area Debrided:  12.96 sq cm     Estimated Blood Loss:  Minimal    Hemostasis Achieved:  by pressure    Procedural Pain:  0  / 10     Post Procedural Pain:  0 / 10     Response to treatment:  Well tolerated by patient. Plan:   E/ Mx 30 minutes. Continue antibiotics per the recommendations of Dr. Reyna Abts @ 125mmhg changed 3x/week    Continue NWB on the left foot. Control edema bilaterally with ACE compression.     Treatment Note please see attached Discharge Instructions    Written patient dismissal instructions given to patient Other:    Multilayer Compression Wrap:    Type: [place] Unna Boot   · Applied in Clinic to the :  Right lower leg(s) on 4/27/2021  · Do not get leg(s) with wrap wet. · If wraps become too tight call the center or completely remove the wrap. · Elevate leg(s) above the level of the heart when sitting. · Avoid prolonged standing in one place. SNF: Change compression/unna boot once a week every Friday      Negative Pressure:           Wound Location:   [x] Pressure @__125_____mmHg continuous using black/green foam.    [] White foam to be used on:     [x]Change dressing: []Monday, Wednesday Friday  [x]Tuesday, Thursday, Saturday  [] Other:    Make sure that tubing is not against the skin by using rolled gauze. Edema Control:   Apply: from fore foot to just below the knee   To bilateral lower legs      [x] Elevate leg(s) above the level of the heart for 30 minutes 4-5 times a day and/or when sitting. [x] Avoid prolonged standing in one place. Patient may participate in therapy with the following restrictions for off-Loading:   [x] Off-loading (keeping weight off as much as you can) when: [x] walking  [x] in bed [] sitting    [x] Total non-weight bearing:  [] Right Leg  [x] Left Leg     [] Partial weight bearing:   [] Right Leg  [] Left Leg     [] Heel weight bearing   [] Toe-touch weight bearing  [] Transfers only    [x] No Weight bearing restrictions to Right Lower Extremitiy    [x] Assistive Device: [x] Walker [] Crutches   [x] Wheelchair   [] Roll About   [] Surgical shoe/Darco [] Podus Boot(s)    [] Heel Protector Soft Boot(s) - DO NOT WALK WHILE WEARING    [] Cast/CAM Boot  [] CROW Boot [] Other:    Dietary:   Important dietary reminders:  1. Increase Protein intake (i.e. Lean meats, fish, eggs, legumes, and yogurt)  2. No added salt  3.  If diabetic, follow a diabetic diet and check glucose prior to meals or as instructed by your physician.    [] SNF: Please have dietician evaluate patient for nutritional needs    Dietary Supplements: [] Levi [] 30ml ProMod/ProStat [] Other:   Take supplements twice a day or as directed as followed:     Return Appointment:   Return Appointment: With Dr Raghavendra Chawla  in  [facility]). o Schedule weekly until (Date): 5/25/2021      [x] Orders placed during your visit: No orders of the defined types were placed in this encounter. Your nurse  is:  Homa Lee     Electronically signed by Isaura Rojas RN on 4/27/2021 at 2:58 PM     [facility] Information: Should you experience any significant changes in your wound(s) or have questions about your wound care, please contact the [address] [place] [address] at [phone]. Our hours vary so please leave a message. Please give us 24-48 hours to return your call. If you need help with your wounds and cannot wait until we are available, contact your PCP or go to the hospital emergency room. Physician Signature:_______________________    Date: ___________ Time:  ____________    Physician for this visit and orders: Raghavendra Chawla DPM    [] Patient unable to sign Discharge Instructions given to ECF/Transportation/POA    Negative Pressure Wound Therapy Foot Anterior; Left (Active)   Wound Type Diabetic foot ulcer 04/14/21 1037   Target Pressure (mmHg) 125 04/13/21 0300   Irrigation Solution Dakins 0.125% 04/11/21 1404   Canister changed? Yes 04/12/21 0749   Dressing Status Clean;Dry; Intact 04/14/21 1037   Dressing Changed Changed/New 04/12/21 0916   Drainage Amount None 04/12/21 0916   Output (ml) 50 ml 04/12/21 0524   Wound Assessment Other (Comment) 04/12/21 0916   Jody-wound Assessment Other (Comment) 04/12/21 0916   Number of days: 17       Wound 04/27/21 Foot Left;Lateral #1 (Active)   Wound Length (cm) 8 cm 04/27/21 1419   Wound Width (cm) 1.5 cm 04/27/21 1419   Wound Depth (cm) 0.5 cm 04/27/21 1419   Wound Surface Area (cm^2) 12 cm^2 04/27/21 1419   Wound Volume (cm^3) 6 cm^3 04/27/21 1419   Distance Tunneling (cm) 0 cm 04/27/21 1419   Undermining Maxium Distance (cm) 0 04/27/21 1419   Wound Assessment Fibrin;Pink/red 04/27/21 1419   Drainage Description Serosanguinous 04/27/21 1419   Odor None 04/27/21 1419   Jody-wound Assessment Maceration 04/27/21 1419   Margins Defined edges 04/27/21 1419   Wound Thickness Description not for Pressure Injury Full thickness 04/27/21 1419   Number of days: 0       Wound 04/27/21 Leg Right; Lower; Posterior #2 (Active)   Wound Length (cm) 0.5 cm 04/27/21 1419   Wound Width (cm) 1 cm 04/27/21 1419   Wound Depth (cm) 0.1 cm 04/27/21 1419   Wound Surface Area (cm^2) 0.5 cm^2 04/27/21 1419   Wound Volume (cm^3) 0.05 cm^3 04/27/21 1419   Distance Tunneling (cm) 0 cm 04/27/21 1419   Undermining Maxium Distance (cm) 0 04/27/21 1419   Wound Assessment Pink/red;Fibrin 04/27/21 1419   Drainage Amount Small 04/27/21 1419   Drainage Description Serosanguinous 04/27/21 1419   Odor None 04/27/21 1419   Jody-wound Assessment Intact 04/27/21 1419   Margins Defined edges 04/27/21 1419   Wound Thickness Description not for Pressure Injury Full thickness 04/27/21 1419   Number of days: 0     Incision Toe (Comment  which one) Anterior;Right (Active)   Number of days:        Incision 04/07/21 Toe (Comment  which one) Anterior; Left (Active)   Dressing Status Clean;Dry; Intact 04/14/21 1037   Incision Cleansed Other (Comment) 04/14/21 1037   Dressing/Treatment Ace wrap;Dry dressing 04/14/21 1037   Closure Other (Comment) 04/14/21 1037   Incision Assessment Other (Comment) 04/12/21 0916   Drainage Amount None 04/12/21 0916   Number of days: 19              Electronically signed by Nereyda Castaneda DPM on 4/27/2021 at 5:03 PM

## 2021-04-27 NOTE — PROGRESS NOTES
[employer] Application   Below Knee    NAME:  Artem Pappas  YOB: 1945  MEDICAL RECORD NUMBER:  [id number]  DATE:  4/27/2021       Applied moisturizing agent to dry skin as needed.  Applied primary and secondary dressing as ordered.  Applied [employer] roll from base of toes to just below the knee overlapping each time.  Applied coban from base of toes to just below the knee.  Secured by pressing down on the coban layer and using paper tape as needed.  Instructed patient/caregiver to keep dressing dry and intact. DO NOT REMOVE DRESSING.  Instructed pt/family/caregiver to report excessive draining, loose bandage, wet dressing, severe pain or tingling in toes.    Unna Boot was applied to: [] Left Lower Leg [x] Right Lower Leg

## 2021-05-10 LAB
FUNGUS (MYCOLOGY) CULTURE: NORMAL
FUNGUS (MYCOLOGY) CULTURE: NORMAL
FUNGUS STAIN: NORMAL
FUNGUS STAIN: NORMAL

## 2021-05-11 ENCOUNTER — HOSPITAL ENCOUNTER (OUTPATIENT)
Dept: WOUND CARE | Age: 76
Discharge: HOME | End: 2021-05-11
Payer: MEDICARE

## 2021-05-11 VITALS
TEMPERATURE: 96.7 F | SYSTOLIC BLOOD PRESSURE: 168 MMHG | DIASTOLIC BLOOD PRESSURE: 69 MMHG | RESPIRATION RATE: 20 BRPM | HEART RATE: 72 BPM

## 2021-05-11 DIAGNOSIS — I83.212 VARICOSE VEINS OF RIGHT LOWER EXTREMITY WITH INFLAMMATION, WITH ULCER OF CALF LIMITED TO BREAKDOWN OF SKIN (HCC): Primary | ICD-10-CM

## 2021-05-11 DIAGNOSIS — L97.422 ULCER OF HEEL AND MIDFOOT, LEFT, WITH FAT LAYER EXPOSED (HCC): ICD-10-CM

## 2021-05-11 DIAGNOSIS — L97.412 MIDFOOT ULCERATION, RIGHT, WITH FAT LAYER EXPOSED (HCC): ICD-10-CM

## 2021-05-11 DIAGNOSIS — L97.211 VARICOSE VEINS OF RIGHT LOWER EXTREMITY WITH INFLAMMATION, WITH ULCER OF CALF LIMITED TO BREAKDOWN OF SKIN (HCC): Primary | ICD-10-CM

## 2021-05-11 PROCEDURE — 29581 APPL MULTLAYER CMPRN SYS LEG: CPT

## 2021-05-11 PROCEDURE — 6370000000 HC RX 637 (ALT 250 FOR IP): Performed by: PODIATRIST

## 2021-05-11 PROCEDURE — 11042 DBRDMT SUBQ TIS 1ST 20SQCM/<: CPT

## 2021-05-11 RX ORDER — GINSENG 100 MG
CAPSULE ORAL ONCE
Status: CANCELLED | OUTPATIENT
Start: 2021-05-11 | End: 2021-05-11

## 2021-05-11 RX ORDER — LIDOCAINE HYDROCHLORIDE 20 MG/ML
JELLY TOPICAL ONCE
Status: CANCELLED | OUTPATIENT
Start: 2021-05-11 | End: 2021-05-11

## 2021-05-11 RX ORDER — LIDOCAINE 50 MG/G
OINTMENT TOPICAL ONCE
Status: CANCELLED | OUTPATIENT
Start: 2021-05-11 | End: 2021-05-11

## 2021-05-11 RX ORDER — GENTAMICIN SULFATE 1 MG/G
OINTMENT TOPICAL ONCE
Status: CANCELLED | OUTPATIENT
Start: 2021-05-11 | End: 2021-05-11

## 2021-05-11 RX ORDER — LIDOCAINE 40 MG/G
CREAM TOPICAL ONCE
Status: CANCELLED | OUTPATIENT
Start: 2021-05-11 | End: 2021-05-11

## 2021-05-11 RX ORDER — CLOBETASOL PROPIONATE 0.5 MG/G
OINTMENT TOPICAL ONCE
Status: CANCELLED | OUTPATIENT
Start: 2021-05-11 | End: 2021-05-11

## 2021-05-11 RX ORDER — BACITRACIN ZINC AND POLYMYXIN B SULFATE 500; 1000 [USP'U]/G; [USP'U]/G
OINTMENT TOPICAL ONCE
Status: CANCELLED | OUTPATIENT
Start: 2021-05-11 | End: 2021-05-11

## 2021-05-11 RX ORDER — BACITRACIN, NEOMYCIN, POLYMYXIN B 400; 3.5; 5 [USP'U]/G; MG/G; [USP'U]/G
OINTMENT TOPICAL ONCE
Status: CANCELLED | OUTPATIENT
Start: 2021-05-11 | End: 2021-05-11

## 2021-05-11 RX ORDER — BETAMETHASONE DIPROPIONATE 0.05 %
OINTMENT (GRAM) TOPICAL ONCE
Status: CANCELLED | OUTPATIENT
Start: 2021-05-11 | End: 2021-05-11

## 2021-05-11 RX ORDER — LIDOCAINE HYDROCHLORIDE 40 MG/ML
SOLUTION TOPICAL ONCE
Status: COMPLETED | OUTPATIENT
Start: 2021-05-11 | End: 2021-05-11

## 2021-05-11 RX ORDER — LIDOCAINE HYDROCHLORIDE 40 MG/ML
SOLUTION TOPICAL ONCE
Status: CANCELLED | OUTPATIENT
Start: 2021-05-11 | End: 2021-05-11

## 2021-05-11 RX ADMIN — LIDOCAINE HYDROCHLORIDE: 40 SOLUTION TOPICAL at 13:49
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
## 2021-05-14 ENCOUNTER — TELEPHONE (OUTPATIENT)
Dept: INFECTIOUS DISEASES | Age: 76
End: 2021-05-14

## 2021-05-17 LAB
ALBUMIN SERPL-MCNC: 3.1 G/DL
ALP BLD-CCNC: 55 U/L
ALT SERPL-CCNC: 10 U/L
ANION GAP SERPL CALCULATED.3IONS-SCNC: NORMAL MMOL/L
AST SERPL-CCNC: 14 U/L
BASOPHILS ABSOLUTE: 0.1 /ΜL
BASOPHILS RELATIVE PERCENT: 1.2 %
BILIRUB SERPL-MCNC: 0.2 MG/DL (ref 0.1–1.4)
BUN BLDV-MCNC: 16 MG/DL
C-REACTIVE PROTEIN: 1.5
CALCIUM SERPL-MCNC: 8.6 MG/DL
CHLORIDE BLD-SCNC: 105 MMOL/L
CO2: 28 MMOL/L
CREAT SERPL-MCNC: 1 MG/DL
EOSINOPHILS ABSOLUTE: 0.3 /ΜL
EOSINOPHILS RELATIVE PERCENT: 5.7 %
FUNGUS (MYCOLOGY) CULTURE: NORMAL
FUNGUS STAIN: NORMAL
GFR CALCULATED: NORMAL
GLUCOSE BLD-MCNC: 62 MG/DL
HCT VFR BLD CALC: 32.1 % (ref 36–46)
HEMOGLOBIN: 10.1 G/DL (ref 12–16)
LYMPHOCYTES ABSOLUTE: 1.2 /ΜL
LYMPHOCYTES RELATIVE PERCENT: 26.7 %
MCH RBC QN AUTO: 27.2 PG
MCHC RBC AUTO-ENTMCNC: 31.5 G/DL
MCV RBC AUTO: 86.3 FL
MONOCYTES ABSOLUTE: 0.5 /ΜL
MONOCYTES RELATIVE PERCENT: 10.2 %
NEUTROPHILS ABSOLUTE: 2.5 /ΜL
NEUTROPHILS RELATIVE PERCENT: 56.2 %
PDW BLD-RTO: 23.6 %
PLATELET # BLD: 239 K/ΜL
PMV BLD AUTO: 8.7 FL
POTASSIUM SERPL-SCNC: 4.4 MMOL/L
RBC # BLD: 3.72 10^6/ΜL
SEDIMENTATION RATE, ERYTHROCYTE: 55
SODIUM BLD-SCNC: 140 MMOL/L
TOTAL PROTEIN: 6.5
WBC # BLD: 4.4 10^3/ML

## 2021-05-18 ENCOUNTER — HOSPITAL ENCOUNTER (OUTPATIENT)
Dept: WOUND CARE | Age: 76
Discharge: HOME | End: 2021-05-18
Payer: MEDICARE

## 2021-05-18 VITALS
SYSTOLIC BLOOD PRESSURE: 192 MMHG | TEMPERATURE: 97.5 F | RESPIRATION RATE: 20 BRPM | DIASTOLIC BLOOD PRESSURE: 66 MMHG | HEART RATE: 68 BPM

## 2021-05-18 DIAGNOSIS — L97.211 VARICOSE VEINS OF RIGHT LOWER EXTREMITY WITH INFLAMMATION, WITH ULCER OF CALF LIMITED TO BREAKDOWN OF SKIN (HCC): ICD-10-CM

## 2021-05-18 DIAGNOSIS — I83.212 VARICOSE VEINS OF RIGHT LOWER EXTREMITY WITH INFLAMMATION, WITH ULCER OF CALF LIMITED TO BREAKDOWN OF SKIN (HCC): ICD-10-CM

## 2021-05-18 DIAGNOSIS — L97.422 ULCER OF HEEL AND MIDFOOT, LEFT, WITH FAT LAYER EXPOSED (HCC): Primary | ICD-10-CM

## 2021-05-18 PROCEDURE — 6370000000 HC RX 637 (ALT 250 FOR IP): Performed by: PODIATRIST

## 2021-05-18 PROCEDURE — 15275 SKIN SUB GRAFT FACE/NK/HF/G: CPT

## 2021-05-18 PROCEDURE — 29580 STRAPPING UNNA BOOT: CPT

## 2021-05-18 PROCEDURE — 11042 DBRDMT SUBQ TIS 1ST 20SQCM/<: CPT

## 2021-05-18 RX ORDER — LIDOCAINE HYDROCHLORIDE 40 MG/ML
SOLUTION TOPICAL ONCE
Status: COMPLETED | OUTPATIENT
Start: 2021-05-18 | End: 2021-05-18

## 2021-05-18 RX ORDER — GENTAMICIN SULFATE 1 MG/G
OINTMENT TOPICAL ONCE
Status: CANCELLED | OUTPATIENT
Start: 2021-05-18 | End: 2021-05-18

## 2021-05-18 RX ORDER — ATORVASTATIN CALCIUM 10 MG/1
10 TABLET, FILM COATED ORAL DAILY
COMMUNITY

## 2021-05-18 RX ORDER — LIDOCAINE 50 MG/G
OINTMENT TOPICAL ONCE
Status: CANCELLED | OUTPATIENT
Start: 2021-05-18 | End: 2021-05-18

## 2021-05-18 RX ORDER — BACITRACIN, NEOMYCIN, POLYMYXIN B 400; 3.5; 5 [USP'U]/G; MG/G; [USP'U]/G
OINTMENT TOPICAL ONCE
Status: CANCELLED | OUTPATIENT
Start: 2021-05-18 | End: 2021-05-18

## 2021-05-18 RX ORDER — CLOBETASOL PROPIONATE 0.5 MG/G
OINTMENT TOPICAL ONCE
Status: CANCELLED | OUTPATIENT
Start: 2021-05-18 | End: 2021-05-18

## 2021-05-18 RX ORDER — GINSENG 100 MG
CAPSULE ORAL ONCE
Status: CANCELLED | OUTPATIENT
Start: 2021-05-18 | End: 2021-05-18

## 2021-05-18 RX ORDER — BACITRACIN ZINC AND POLYMYXIN B SULFATE 500; 1000 [USP'U]/G; [USP'U]/G
OINTMENT TOPICAL ONCE
Status: CANCELLED | OUTPATIENT
Start: 2021-05-18 | End: 2021-05-18

## 2021-05-18 RX ORDER — BETAMETHASONE DIPROPIONATE 0.05 %
OINTMENT (GRAM) TOPICAL ONCE
Status: CANCELLED | OUTPATIENT
Start: 2021-05-18 | End: 2021-05-18

## 2021-05-18 RX ORDER — LIDOCAINE HYDROCHLORIDE 20 MG/ML
JELLY TOPICAL ONCE
Status: CANCELLED | OUTPATIENT
Start: 2021-05-18 | End: 2021-05-18

## 2021-05-18 RX ORDER — LIDOCAINE 40 MG/G
CREAM TOPICAL ONCE
Status: CANCELLED | OUTPATIENT
Start: 2021-05-18 | End: 2021-05-18

## 2021-05-18 RX ORDER — LIDOCAINE HYDROCHLORIDE 40 MG/ML
SOLUTION TOPICAL ONCE
Status: CANCELLED | OUTPATIENT
Start: 2021-05-18 | End: 2021-05-18

## 2021-05-18 RX ADMIN — LIDOCAINE HYDROCHLORIDE: 40 SOLUTION TOPICAL at 13:43

## 2021-05-18 NOTE — PROGRESS NOTES
[employer] Application   Below Knee    NAME:  Vincent Colin  YOB: 1945  MEDICAL RECORD NUMBER:  [id number]  DATE:  5/18/2021       Applied moisturizing agent to dry skin as needed.  Applied primary and secondary dressing as ordered.  Applied [employer] roll from base of toes to just below the knee overlapping each time.  Applied coban from base of toes to just below the knee.  Secured by pressing down on the coban layer and using paper tape as needed.  Instructed patient/caregiver to keep dressing dry and intact. DO NOT REMOVE DRESSING.  Instructed pt/family/caregiver to report excessive draining, loose bandage, wet dressing, severe pain or tingling in toes.    Unna Boot was applied to: [] Left Lower Leg [x] Right Lower Leg

## 2021-05-18 NOTE — DISCHARGE INSTR - COC
Continuity of Care Form    Patient Name: Jimmy Solorzano   :  1945  MRN:  [id number]    Admit date:  2021  Discharge date:  ***    Code Status Order: Prior   Advance Directives:   [facility] Documentation     Date/Time Healthcare Directive Type of Healthcare Directive Copy in [facility] [address] Agent's Name Healthcare Agent's Phone Number    21 1342  No, patient does not have an advance directive for healthcare treatment -- -- -- -- --          Admitting Physician:  No admitting provider for patient encounter. PCP: Abigail Tran MD    Discharging Nurse: [facility] Unit/Room#: No information available for this encounter. Discharging Unit Phone Number: ***    Emergency Contact:   Extended Emergency Contact Information  Primary Emergency Contact: Sarahi Jurado, [address] [employer] Phone: [phone]  Relation: Other    Past Surgical History:  Past Surgical History:   Procedure Laterality Date    BREAST BIOPSY Right     FOOT DEBRIDEMENT Left 2021    LEFT LOWER EXTREMITY FOOT DEBRIDEMENT INCISION AND DRAINAGE WITH  5TH METATARSAL RESECTION performed by Gray Moya DPM at [address] Left 2021    LEFT LOWER EXTREMITY EXCISIONAL [address] 11X4 CM WOUND; LEFT FIFTH TOE AMPUTATION, WOUND VAC APPLICAITON performed by Brie Ch DPM at [place] [address] Left 2021    LEFT LOWER EXTREMITY: INCISION AND DRAINAGE.  EXCISIONAL DEBRIDEMENT OF MUSCLE AND FASCIA 23YEL1LM WOUND; LEFT FIFTH TOE AMPUTATION, WOUND VAC APPLICATION    TOE AMPUTATION Right 10/30/2020    PARTIAL AMPUTATION 5TH RAY RIGHT FOOT performed by Emi Delarosa DPM at [facility]       Immunization History:   Immunization History   Administered Date(s) Administered    Influenza 10/15/2010, 11/15/2011    Influenza Vaccine, unspecified formulation 11/15/2011, 01/10/2013, 11/10/2014, 2015    Influenza, High Dose (Fluzone 65 yrs and older) 01/10/2013, 11/10/2014, 09/14/2015, 11/21/2016, 12/27/2017, 12/24/2018    Influenza, Triv, inactivated, subunit, adjuvanted, IM (Fluad 65 yrs and older) 10/22/2019, 10/28/2020    Pneumococcal Conjugate 13-valent ([id number]) 03/15/2016    Pneumococcal Polysaccharide ([id number]) 10/15/2010    Tdap (Boostrix, Adacel) 05/18/2011       Active Problems:  Patient Active Problem List   Diagnosis Code    Hyperlipidemia E78.5    HTN (hypertension) I10    Uncontrolled type 2 diabetes mellitus with complication, with long-term current use of insulin ([facility]) E11.8, E11.65, Z79.4    Angioedema T78. 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 ([facility]) I83.212, L97.219    Class 3 severe obesity due to excess calories without serious comorbidity with body mass index (BMI) of 45.0 to 49.9 in adult ([facility] E66.01, Z68.42    Hypoxia R09.02    Acute pulmonary embolism ([facility]) I26.99    Dyspnea on exertion R06.00    Calcification of gallbladder K82.8    Osteomyelitis ([facility]) M86.9    Diabetic foot infection ([facility]) E11.628, L08.9    Group B streptococcal infection A49.1    Chronic osteomyelitis of left foot with draining sinus ([facility]) M86.472    Ulcer of heel and midfoot, left, with fat layer exposed ([facility] L97.422       Isolation/Infection:   Isolation          No Isolation        Patient Infection Status     None to display          Nurse Assessment:  Last Vital Signs: BP (!) 192/66   Pulse 68   Temp 97.5 °F (36.4 °C) (Temporal)   Resp 20   LMP  (LMP Unknown)     Last documented pain score (0-10 scale):    Last Weight:   Wt Readings from Last 1 Encounters:   04/07/21 256 lb (116.1 kg)     Mental Status:  {IP PT MENTAL STATUS:20030}    IV Access:  { MYRNA IV ACCESS:637550007}    Nursing Mobility/ADLs:  Walking   {CHP DME HHEJ:860506534}  Transfer  {CHP DME TBYD:304952913}  Bathing  {CHP DME IAXI:828427407}  Dressing  {CHP DME SXVS:581095931}  Toileting  {HIRO DME (cm^2) 0.04 cm^2 05/18/21 1343   Change in Wound Size % (l*w) 92 05/18/21 1343   Wound Volume (cm^3) 0 cm^3 05/18/21 1343   Wound Healing % 100 05/18/21 1343   Post-Procedure Length (cm) 0.3 cm 05/18/21 1444   Post-Procedure Width (cm) 0.3 cm 05/18/21 1444   Post-Procedure Depth (cm) 0.3 cm 05/18/21 1444   Post-Procedure Surface Area (cm^2) 0.09 cm^2 05/18/21 1444   Post-Procedure Volume (cm^3) 0.03 cm^3 05/18/21 1444   Distance Tunneling (cm) 0 cm 05/18/21 1343   Undermining Maxium Distance (cm) 0 05/18/21 1343   Wound Assessment Pink/red;Fibrin 05/18/21 1343   Drainage Amount Small 05/18/21 1343   Drainage Description Serosanguinous 05/18/21 1343   Odor None 05/18/21 1343   Jody-wound Assessment Dry/flaky 05/18/21 1343   Margins Defined edges 05/18/21 1343   Wound Thickness Description not for Pressure Injury Full thickness 05/18/21 1343   Number of days: 21        Elimination:  Continence:   · Bowel: {YES / EL:78060}  · Bladder: {YES / DF:82512}  Urinary Catheter: {Urinary Catheter:029661227}   Colostomy/Ileostomy/Ileal Conduit: {YES / CU:57452}       Date of Last BM: ***  No intake or output data in the 24 hours ending 05/18/21 1501  No intake/output data recorded.     Safety Concerns:     [address] [email or web address] Safety Concerns:206311420}    Impairments/Disabilities:      [address] [email or web address] Impairments/Disabilities:689216950}    Nutrition Therapy:  Current Nutrition Therapy:   [address] [email or web address] Diet List:909185589}    Routes of Feeding: {CHP DME Other Feedings:650360628}  Liquids: {Slp liquid thickness:62564}  Daily Fluid Restriction: {CHP DME Yes amt example:648996110}  Last Modified Barium Swallow with Video (Video Swallowing Test): {Done Not Done VDXV:357243128}    Treatments at the Time of Hospital Discharge:   Respiratory Treatments: ***  Oxygen Therapy:  {Therapy; copd oxygen:18954}  Ventilator:    {STEFANIE MILLS Vent LTCD:172951070}    Rehab Therapies: {THERAPEUTIC INTERVENTION:3889638797}  Weight Bearing Status/Restrictions: {STEFANIE MILLS Weight Bearin}  Other Medical Equipment (for information only, NOT a DME order):  {EQUIPMENT:423504730}  Other Treatments: ***    Patient's personal belongings (please select all that are sent with patient):  {CHP DME Belongings:759967405}    RN SIGNATURE:  {Esignature:155678853}    CASE MANAGEMENT/SOCIAL WORK SECTION    Inpatient Status Date: ***    Readmission Risk Assessment Score:  Readmission Risk              Risk of Unplanned Readmission:  0           Discharging to Facility/ Agency   · Name:   · Address:  · Phone:  · Fax:    Dialysis Facility (if applicable)   · Name:  · Address:  · Dialysis Schedule:  · Phone:  · Fax:    / signature: {Esignature:145700615}    PHYSICIAN SECTION    Prognosis: {Prognosis:6547291158}    Condition at Discharge: [address] [place] [address] Patient Condition:780518536}    Rehab Potential (if transferring to Rehab): {Prognosis:3490739490}    Recommended Labs or Other Treatments After Discharge: ***    Physician Certification: I certify the above information and transfer of Ita Becker  is necessary for the continuing treatment of the diagnosis listed and that she requires {Admit to Appropriate Level of Care:65476} for {GREATER/LESS:309324926} 30 days.      Update Admission H&P: {CHP DME Changes in EDKDX:177576106}    PHYSICIAN SIGNATURE:  {Esignature:017595048}

## 2021-05-19 NOTE — TELEPHONE ENCOUNTER
Reviewed labs  Reviewed chart - has had f/u with Podiatry / Dr Barth Raw    Will end iv as scheduled  / remove line

## 2021-05-19 NOTE — TELEPHONE ENCOUNTER
[facility] RN @ [address] advised about pulling PICC line and d/c the IV antibiotic

## 2021-05-20 NOTE — PROGRESS NOTES
[address]   Progress Note and Procedure Note      Sharath Hathaway  MEDICAL RECORD NUMBER:  [id number]  AGE: 76 y.o. GENDER: female  : 1945  EPISODE DATE:  2021    Subjective:     Chief Complaint   Patient presents with    Wound Check     bilateral legs         HISTORY of PRESENT ILLNESS HPI     Sharath Hathaway is a 76 y.o. female who presents today for wound/ulcer evaluation. History of Wound Context: patient presents today for follow up of a DFU s/p I&D. She has been at an Counts include 234 beds at the [facility] getting wound vac and IV abx therapy. She has been NWB as recommended. Wound/Ulcer Pain Timing/Severity: none  Quality of pain: N/A  Severity:  0 / 10   Modifying Factors: None  Associated Signs/Symptoms: none    Ulcer Identification:  Ulcer Type: diabetic and non-healing surgical    Contributing Factors: diabetes and poor glucose control    Acute Wound: N/A    PAST MEDICAL HISTORY        Diagnosis Date    Acute respiratory failure with hypoxia ([address] 2019    Hx of blood clots 2019    PE    Hyperlipidemia     Hypertension     Mallet finger 3/4/2015    PONV (postoperative nausea and vomiting)     Type II or unspecified type diabetes mellitus without mention of complication, not stated as uncontrolled        PAST SURGICAL HISTORY    Past Surgical History:   Procedure Laterality Date    BREAST BIOPSY Right     FOOT DEBRIDEMENT Left 2021    LEFT LOWER EXTREMITY FOOT DEBRIDEMENT INCISION AND DRAINAGE WITH  5TH METATARSAL RESECTION performed by Kathy Sue DPM at [address] Left 2021    LEFT LOWER EXTREMITY EXCISIONAL [address] 11X4 CM WOUND; LEFT FIFTH TOE AMPUTATION, WOUND VAC APPLICAITON performed by Eris Gallegos DPM at [address] [place] [address] Left 2021    LEFT LOWER EXTREMITY: INCISION AND DRAINAGE.  EXCISIONAL DEBRIDEMENT OF MUSCLE AND FASCIA [id number] WOUND; LEFT FIFTH TOE AMPUTATION, WOUND VAC APPLICATION    TOE AMPUTATION Right 10/30/2020    PARTIAL AMPUTATION 5TH RAY RIGHT FOOT performed by Jason Wright DPM at [facility]    Family History   Problem Relation Age of Onset    Diabetes Father     Heart Disease Father     COPD Sister     Alzheimer's Disease Mother     Cancer Brother         lungs    Diabetes Sister     Stroke Sister     Cancer Sister         anal       SOCIAL HISTORY    Social History     Tobacco Use    Smoking status: Former Smoker     Packs/day: 0.25     Years: 38.00     Pack years: 9.50     Types: Cigarettes     Start date: 10/22/1958     Quit date: 1980     Years since quittin.4    Smokeless tobacco: Never Used   Vaping Use    Vaping Use: Never used   Substance Use Topics    Alcohol use: Yes     Alcohol/week: 1.0 standard drinks     Types: 1 Cans of beer per week     Comment: occasional     Drug use: Never       ALLERGIES    Allergies   Allergen Reactions    Latex Rash    Adhesive Tape     Aleve [Naproxen Sodium]      Possible angioedema    Cozaar [Losartan Potassium]      angioedema    Iodine     Lasix [Furosemide] Swelling    Lisinopril      Cough and sore throat     Metformin And Related      Possible angioedema    Nifedipine      swells    Norvasc [Amlodipine Besylate]      Leg swelling    Novolog [Insulin Aspart]      dizzyness    Pioglitazone      edema    Zoloft [Sertraline Hcl]      Vision loss       MEDICATIONS    Current Outpatient Medications on File Prior to Encounter   Medication Sig Dispense Refill    atorvastatin (LIPITOR) 10 MG tablet Take 10 mg by mouth daily      cefTRIAXone (ROCEPHIN) 2 g injection Inject 2,000 mg into the muscle every 24 hours Until 2021      Cholecalciferol (VITAMIN D3) 75 MCG (3000 UT) TABS Take by mouth 1.25mg (59848BA) daily      ammonium lactate (LAC-HYDRIN) 12 % lotion Apply topically as needed for Dry Skin Apply topically as needed.       Multiple Vitamins-Minerals (CEROVITE SENIOR PO) Take 1 tablet by mouth daily      insulin NPH (HUMULIN N;NOVOLIN N) 100 UNIT/ML injection pen Inject 30 Units into the skin 2 times daily (before meals) 5 pen 3    labetalol (NORMODYNE) 100 MG tablet TAKE ONE TABLET BY MOUTH TWICE A  tablet 0    vitamin B-12 (CYANOCOBALAMIN) 100 MCG tablet Take 50 mcg by mouth daily      insulin regular (HUMULIN R) 100 UNIT/ML injection INJECT 45 UNITS INTO THE ARM UNDER THE SKIN AT LUNCH AND THEN INJECT 25 UNITS UNDER THE SKIN AT BEDTIME (Patient taking differently: Sliding scale) 1 vial 2    ONETOUCH ULTRA strip USE ONE STRIP TO TEST TWICE A DAY AS NEEDED 100 strip 2    PROMOGRAN WOUND DRESSING MATRIX Apply topically Trim product to match the size of the wound. Apply topically. Moisten lightly with saline. Cover with dry sterile dressing. 1 Package 2    SODIUM CHLORIDE, EXTERNAL, (SALINE WOUND [address]) 0.9 % SOLN Moisten Promogran wound dressing with saline with each application. 1 Can 0    acetaminophen (TYLENOL) 500 MG tablet Take 500 mg by mouth every 6 hours as needed for Pain      Handicap Placard MISC by Does not apply route Duration 5 years 1 each 0    Insulin Syringe-Needle U-100 (BD INSULIN SYRINGE U/F) 31G X 5/16\" 0.5 ML MISC USE ONE SYRINGE FOUR TIMES A  each 5    ONETOUCH DELICA LANCETS 44L MISC USE TO TEST BLOOD SUGAR THREE TIMES A  each 0    Blood Pressure KIT Apply 1 applicator topically 2 times daily 1 kit 0    blood glucose monitor strips Test 3-4 times a day & as needed for symptoms of irregular blood glucose. DX: E11.9. Supply the strips that go with her meter and approved by insurance 300 strip 1    Lancets MISC 1 each by Does not apply route daily Test 3-4 times a day & as needed for symptoms of irregular blood glucose. DX: E11.9.  Supply the lancets that go with her meter and approved by insurance 300 each 3    Continuous Blood Gluc Sensor (FREESTYLE VCÍTOR 14 DAY SENSOR) [facility] CHECK GLUCOSE 4 TIMES DAILY 2 each 2    B-D ULTRAFINE III SHORT PEN 31G X Undermining Maxium Distance (cm) 0 05/11/21 1340   Wound Assessment Fibrin;Pink/red 05/18/21 1343   Drainage Amount Moderate 05/18/21 1343   Drainage Description Serosanguinous 05/18/21 1343   Odor None 05/18/21 1343   Jody-wound Assessment Maceration 05/18/21 1343   Margins Defined edges 05/18/21 1343   Wound Thickness Description not for Pressure Injury Full thickness 05/11/21 1340   Number of days: 23       Wound 04/27/21 Leg Right; Lower; Posterior #2 (Active)   Wound Etiology Venous 04/27/21 1419   Wound Cleansed Cleansed with saline 05/18/21 1343   Dressing/Treatment Collagen with Ag 05/18/21 1502   Wound Length (cm) 0.2 cm 05/18/21 1343   Wound Width (cm) 0.2 cm 05/18/21 1343   Wound Depth (cm) 0.1 cm 05/18/21 1343   Wound Surface Area (cm^2) 0.04 cm^2 05/18/21 1343   Change in Wound Size % (l*w) 92 05/18/21 1343   Wound Volume (cm^3) 0 cm^3 05/18/21 1343   Wound Healing % 100 05/18/21 1343   Post-Procedure Length (cm) 0.3 cm 05/18/21 1444   Post-Procedure Width (cm) 0.3 cm 05/18/21 1444   Post-Procedure Depth (cm) 0.3 cm 05/18/21 1444   Post-Procedure Surface Area (cm^2) 0.09 cm^2 05/18/21 1444   Post-Procedure Volume (cm^3) 0.03 cm^3 05/18/21 1444   Distance Tunneling (cm) 0 cm 05/18/21 1343   Undermining Maxium Distance (cm) 0 05/18/21 1343   Wound Assessment Pink/red;Fibrin 05/18/21 1343   Drainage Amount Small 05/18/21 1343   Drainage Description Serosanguinous 05/18/21 1343   Odor None 05/18/21 1343   Jody-wound Assessment Dry/flaky 05/18/21 1343   Margins Defined edges 05/18/21 1343   Wound Thickness Description not for Pressure Injury Full thickness 05/18/21 1343   Number of days: 23     Incision Toe (Comment  which one) Anterior;Right (Active)   Number of days: Total Surface Area Debrided:  6.93 sq cm     Estimated Blood Loss:  Minimal    Hemostasis Achieved:  by pressure    Procedural Pain:  0  / 10     Post Procedural Pain:  0 / 10     Response to treatment:  Well tolerated by patient. Ag 05/18/21 1502   Wound Length (cm) 0.2 cm 05/18/21 1343   Wound Width (cm) 0.2 cm 05/18/21 1343   Wound Depth (cm) 0.1 cm 05/18/21 1343   Wound Surface Area (cm^2) 0.04 cm^2 05/18/21 1343   Change in Wound Size % (l*w) 92 05/18/21 1343   Wound Volume (cm^3) 0 cm^3 05/18/21 1343   Wound Healing % 100 05/18/21 1343   Post-Procedure Length (cm) 0.3 cm 05/18/21 1444   Post-Procedure Width (cm) 0.3 cm 05/18/21 1444   Post-Procedure Depth (cm) 0.3 cm 05/18/21 1444   Post-Procedure Surface Area (cm^2) 0.09 cm^2 05/18/21 1444   Post-Procedure Volume (cm^3) 0.03 cm^3 05/18/21 1444   Distance Tunneling (cm) 0 cm 05/18/21 1343   Undermining Maxium Distance (cm) 0 05/18/21 1343   Wound Assessment Pink/red;Fibrin 05/18/21 1343   Drainage Amount Small 05/18/21 1343   Drainage Description Serosanguinous 05/18/21 1343   Odor None 05/18/21 1343   Jody-wound Assessment Dry/flaky 05/18/21 1343   Margins Defined edges 05/18/21 1343   Wound Thickness Description not for Pressure Injury Full thickness 05/18/21 1343   Number of days: 23     Incision Toe (Comment  which one) Anterior;Right (Active)   Number of days:        Diabetic/Pressure/Non Pressure Ulcers:  Ulcer:   Diabetic ulcer, fat layer exposed      Total Surface Area of Ulcer(s) Covered 6.93 sq/cm    Was the Product Layered  No     Amount of Product Applied 6 sq/cm      Amount of Product Wasted 0 sq/cm     Reason for Waste: N/A     Surgically Fixated: Yes    Secured With: Steri Strips and Silicone Dressing     Procedural Pain: 0/10     Post Procedural Pain: 0 / 10    Response to Treatment: Well tolerated by patient. This was the first application of Theraskin        Plan:   E/ Mx 30 minutes. Ulceration has a good granular base. A TheraSkin was applied and surgically fixated. Patient was instructed to keep her dressing clean dry and intact. Patient may stand and weight-bear for transfers.   If the wound continues to improve will allow ambulation possibly next week.    Control edema with ACE compression on the left and a multilayer compression dressing on the right. Treatment Note please see attached Discharge Instructions    Written patient dismissal instructions given to patient and signed by patient or POA. RTC 1 week      Discharge [address] [facility] Physician Orders and Discharge Instructions  [address] [address] [address] [address] [facility]. [place] [address]  Telephone: [phone]    **NEW ORDERS**  Skilled Nursing Facility: [place] [address] dressing?: Yes (Right Leg Only)    Wound Cleansing:   · With each dressing change, rinse wounds with 0.9% Saline. · Keep wounds dry in the shower. Topical Treatments:  Do not apply lotions, creams, or ointments to wound bed unless directed as followed:      [x]? ? Apply to yolis-wound:          [x]? ? Moisturizing lotion - to bilateral lower legs avoiding the yolis wound    Dressings:                  Wound Location: Left Foot                []? ? Apply Primary Dressing to wound:                                             []? ? Foam/Foam with Border(i.e Mepilex)        [x]? ? Non-adherent (i.e.Mepitel)              []? ? Alginate with Silver (i.e. Silvercel)             []? ? Alginate (i.e. Aquacel)              []? ? Collagen (i.e. Puracol)                               []? ? Collagen with Silver(i.e. Awilda)                []? ? Hydrocolloid                                               []? ? Hydrafera Blue moistened with saline              []? ? MediHoney Paste/Gel                               [x]? ? Hydrogel                                         []? ? Endoform                                 []? ? Santyl covered with gauze moisten with saline                                        []? ? Betadine              []? ? Bactroban/Mupirocin                                 []? ? Polysporin/Neosporin                    []? ? Other:               []? ?Dynex \"wet to dry\" gauze      []? ? Pack wound loosely with: []?? Iodoform   []? ? Plain Packing      []? ? Other:       [x]? ? Cover and Secure with:                           [x]? ? Dry Gauze              []? ? ABD            []? ? Cast padding         [x]? ? Kerlix or Claudette rolled gauze   []? ? Super Absorbent (i.e. Marisol Gerold)                []? ? Coban                    []? ? Ace Wrap   [x]? ? Ace Wrap from forefoot to just below the knee   []? ? Paper Tape            []? ? Medipore Tape      []? ? Other:               Avoid contact of tape with skin if possible.     [x]? ? Change dressing:            []? ?Derrick Ronnie           []? ? Every Other Day                []? ? Three times per week: []?? Mon/Wed/Fri      []? ? Tue/Thurs/Sat              []? ? Once a week                      [x]? ? Do Not Change Dressing  [x]? ? Other: Change out dressing only if dressing becomes soiled or wet leaving steri strips and non adherent in place)      [x]Application of a biologic skin substitute:   Theraskin : Wound Location: Left Foot  This is a highly specialized wound care dressing that is intended to enhance your own bodies ability to increase growth factors and other healing abilities. Please leave the dressing clean, dry and intact unless otherwise instructed by your physician. Leave steri-strips and non adherent in place if changing the dressing. Dressings:                  Wound Location: Right Lower Leg                []? ? Apply Primary Dressing to wound:                                             []? ? Foam/Foam with Border(i.e Mepilex)        []? ? Non-adherent (i.e.Mepitel)              []? ? Alginate with Silver (i.e. Silvercel)             []? ? Alginate (i.e. Aquacel)              []? ? Collagen (i.e. Puracol)                               [x]? ? Collagen with Silver(i.e. Awilda)                []? ? Hydrocolloid                                               []? ? Hydrafera Blue moistened with saline              []? ?[address] Paste/Gel                               []? ? Hydrogel                                         []? ? Endoform                                 []? ? Santyl covered with gauze moisten with saline                                        []? ? Betadine              []? ? Bactroban/Mupirocin                                 []? ? Polysporin/Neosporin                    []? ? Other:               []? ? Saline \"wet to dry\" gauze      []? ? Pack wound loosely with: []?? Iodoform   []? ? Plain Packing      []? ? Other:       [x]? ? Cover and Secure with:                           [x]? ? Dry Gauze              []? ? ABD            []? ? Cast padding         []? ? Kerlix or Claudette rolled gauze   []? ? Super Absorbent (i.e. Yakov Cameron)                []? ? Coban                    []? ? Ace Wrap   []? ? Ace Wrap from forefoot to just below the knee   []? ? Paper Tape            []? ? Medipore Tape      []? ? Other:               Avoid contact of tape with skin if possible.     [x]? ? Change dressing:            []? ?Gabbi Perone           []? ? Every Other Day                []? ? Three times per week: []?? Mon/Wed/Fri      []? ? Tue/Thurs/Sat              [x]? ? Once a week                      []? ? Do Not Change Dressing  []? ? Other:     Multilayer Compression Wrap:                     Type: Calamine Unna Boot   · Applied in Clinic to the :  Right lower leg(s) on 5/18/2021  · Do not get leg(s) with wrap wet. · If wraps become too tight call the center or completely remove the wrap. · Elevate leg(s) above the level of the heart when sitting. · Avoid prolonged standing in one place.    SNF: Change compression/unna boot once a week every Friday      **Place on Vac Vacation for 1 week**   Negative Pressure:           Wound Location:   [x] Pressure @_______mmHg continuous using black/green foam.    [] White foam to be used on:     [x]Change dressing: []Monday, Wednesday Friday  []Tuesday, Thursday, Saturday  [] Other:    Make sure that tubing is not against the skin by using rolled gauze. Edema Control:     [x] Elevate leg(s) above the level of the heart for 30 minutes 4-5 times a day and/or when sitting. [x] Avoid prolonged standing in one place. Patient may participate in therapy with the following restrictions for off-Loading:   [x]? ? Off-loading (keeping weight off as much as you can) when:        [x]? ? walking       [x]? ? in bed     []? ? sitting     []? ? Total non-weight bearing:  []? ? Right Leg  []? ? Left Leg                [x]? ? Partial weight bearing:   []? ? Right Leg  [x]? ? Left Leg  (No walking allowed)              []? ? Heel weight bearing   []? ? Toe-touch weight bearing  [x]? ? Transfers only     [x]? ? No Weight bearing restrictions to Right Lower Extremitiy     [x]? ?Clista Bolk Device:   [x]? ? Walker        []? ? Crutches                 [x]? ? Wheelchair                         []? ? Roll About              []? ? Surgical shoe/Darco          []? ? Podus Boot(s)        []? ? Heel Protector Soft Boot(s) - DO NOT WALK WHILE WEARING                         []? ? Cast/CAM Boot                  []? ?Kajal Crumbly Boot           []? ? Other:    Dietary:   Important dietary reminders:  1. Increase Protein intake (i.e. Lean meats, fish, eggs, legumes, and yogurt)  2. No added salt  3. If diabetic, follow a diabetic diet and check glucose prior to meals or as instructed by your physician.    [] SNF: Please have dietician evaluate patient for nutritional needs    Dietary Supplements: [] Juanjose Phoenix [] 30ml ProMod/ProStat [] Other:   Take supplements twice a day or as directed as followed:     Return Appointment:   Return Appointment: With Dr Nain Lr  in  [facility]).   o Schedule weekly until (Date): 5/25      [x] Orders placed during your visit:   Orders Placed This Encounter   Procedures    Initiate Outpatient Wound Care Protocol       Your nurse  is:  Matteo cShultz     Electronically signed by Paola Martínez RN on 5/18/2021 at 2:45 PM     [facility] Information: Should you experience any significant changes in your wound(s) or have questions about your wound care, please contact the [address] [place] [address] at [phone]. Our hours vary so please leave a message. Please give us 24-48 hours to return your call. If you need help with your wounds and cannot wait until we are available, contact your PCP or go to the hospital emergency room. Physician Signature:_______________________    Date: ___________ Time:  ____________    Physician for this visit and orders: Nereyda Castaneda DPM    [] Patient unable to sign Discharge Instructions given to ECF/Transportation/POA    Negative Pressure Wound Therapy Foot Anterior; Left (Active)   Number of days: 38       Wound 04/27/21 Foot Left;Lateral #1 (Active)   Wound Etiology Diabetic Earl 3 04/27/21 1419   Wound Cleansed Cleansed with saline 05/18/21 1343   Dressing/Treatment Other (comment) 05/18/21 1444   Offloading for Diabetic Foot Ulcers Yes (type) 05/11/21 1431   Wound Length (cm) 6.2 cm 05/18/21 1343   Wound Width (cm) 1 cm 05/18/21 1343   Wound Depth (cm) 0.1 cm 05/18/21 1343   Wound Surface Area (cm^2) 6.2 cm^2 05/18/21 1343   Change in Wound Size % (l*w) 48.33 05/18/21 1343   Wound Volume (cm^3) 0.62 cm^3 05/18/21 1343   Wound Healing % 90 05/18/21 1343   Post-Procedure Length (cm) 6.3 cm 05/18/21 1444   Post-Procedure Width (cm) 1.1 cm 05/18/21 1444   Post-Procedure Depth (cm) 0.3 cm 05/18/21 1444   Post-Procedure Surface Area (cm^2) 6.93 cm^2 05/18/21 1444   Post-Procedure Volume (cm^3) 2.08 cm^3 05/18/21 1444   Distance Tunneling (cm) 0 cm 05/11/21 1340   Tunneling Position ___ O'Clock 0.3 @ 6:00 05/18/21 1343   Undermining Maxium Distance (cm) 0 05/11/21 1340   Wound Assessment Fibrin;Pink/red 05/18/21 1343   Drainage Amount Moderate 05/18/21 1343   Drainage Description Serosanguinous 05/18/21 1343   Odor None 05/18/21 1343   Jody-wound Assessment Maceration 05/18/21 1343   Margins Defined edges 05/18/21 1343   Wound Thickness Description not for Pressure Injury Full thickness 05/11/21 1340   Number of days: 21       Wound 04/27/21 Leg Right; Lower; Posterior #2 (Active)   Wound Etiology Venous 04/27/21 1419   Wound Cleansed Cleansed with saline 05/18/21 1343   Dressing/Treatment Collagen with Ag 05/11/21 1448   Wound Length (cm) 0.2 cm 05/18/21 1343   Wound Width (cm) 0.2 cm 05/18/21 1343   Wound Depth (cm) 0.1 cm 05/18/21 1343   Wound Surface Area (cm^2) 0.04 cm^2 05/18/21 1343   Change in Wound Size % (l*w) 92 05/18/21 1343   Wound Volume (cm^3) 0 cm^3 05/18/21 1343   Wound Healing % 100 05/18/21 1343   Post-Procedure Length (cm) 0.3 cm 05/18/21 1444   Post-Procedure Width (cm) 0.3 cm 05/18/21 1444   Post-Procedure Depth (cm) 0.3 cm 05/18/21 1444   Post-Procedure Surface Area (cm^2) 0.09 cm^2 05/18/21 1444   Post-Procedure Volume (cm^3) 0.03 cm^3 05/18/21 1444   Distance Tunneling (cm) 0 cm 05/18/21 1343   Undermining Maxium Distance (cm) 0 05/18/21 1343   Wound Assessment Pink/red;Fibrin 05/18/21 1343   Drainage Amount Small 05/18/21 1343   Drainage Description Serosanguinous 05/18/21 1343   Odor None 05/18/21 1343   Jody-wound Assessment Dry/flaky 05/18/21 1343   Margins Defined edges 05/18/21 1343   Wound Thickness Description not for Pressure Injury Full thickness 05/18/21 1343   Number of days: 21     Incision Toe (Comment  which one) Anterior;Right (Active)   Number of days:             Electronically signed by Eunice Andrade DPM on 5/20/2021 at 9:29 AM

## 2021-05-24 LAB
ALBUMIN SERPL-MCNC: 3.2 G/DL
ALP BLD-CCNC: 54 U/L
ALT SERPL-CCNC: 11 U/L
ANION GAP SERPL CALCULATED.3IONS-SCNC: NORMAL MMOL/L
AST SERPL-CCNC: 15 U/L
BASOPHILS ABSOLUTE: 0.1 /ΜL
BASOPHILS RELATIVE PERCENT: 1.2 %
BILIRUB SERPL-MCNC: 0.4 MG/DL (ref 0.1–1.4)
BUN BLDV-MCNC: 24 MG/DL
C-REACTIVE PROTEIN: 1.9
CALCIUM SERPL-MCNC: 8.7 MG/DL
CHLORIDE BLD-SCNC: 105 MMOL/L
CO2: 30 MMOL/L
CREAT SERPL-MCNC: 1.1 MG/DL
EOSINOPHILS ABSOLUTE: 0.2 /ΜL
EOSINOPHILS RELATIVE PERCENT: 5.4 %
GFR CALCULATED: NORMAL
GLUCOSE BLD-MCNC: 54 MG/DL
HCT VFR BLD CALC: 32.6 % (ref 36–46)
HEMOGLOBIN: 10.1 G/DL (ref 12–16)
LYMPHOCYTES ABSOLUTE: 1.2 /ΜL
LYMPHOCYTES RELATIVE PERCENT: 27.1 %
MCH RBC QN AUTO: 27.5 PG
MCHC RBC AUTO-ENTMCNC: 32 G/DL
MCV RBC AUTO: 85.9 FL
MONOCYTES ABSOLUTE: 0.5 /ΜL
MONOCYTES RELATIVE PERCENT: 11.5 %
NEUTROPHILS ABSOLUTE: 2.4 /ΜL
NEUTROPHILS RELATIVE PERCENT: 54.8 %
PLATELET # BLD: 206 K/ΜL
PMV BLD AUTO: 9 FL
POTASSIUM SERPL-SCNC: 4.4 MMOL/L
RBC # BLD: 3.8 10^6/ΜL
SEDIMENTATION RATE, ERYTHROCYTE: 63
SODIUM BLD-SCNC: 142 MMOL/L
TOTAL PROTEIN: 6.4
WBC # BLD: 4.4 10^3/ML

## 2021-05-25 ENCOUNTER — HOSPITAL ENCOUNTER (OUTPATIENT)
Dept: WOUND CARE | Age: 76
Discharge: HOME | End: 2021-05-25
Payer: MEDICARE

## 2021-05-25 VITALS
DIASTOLIC BLOOD PRESSURE: 63 MMHG | TEMPERATURE: 97.3 F | RESPIRATION RATE: 18 BRPM | HEART RATE: 70 BPM | SYSTOLIC BLOOD PRESSURE: 176 MMHG

## 2021-05-25 DIAGNOSIS — L97.422 ULCER OF HEEL AND MIDFOOT, LEFT, WITH FAT LAYER EXPOSED (HCC): Primary | ICD-10-CM

## 2021-05-25 DIAGNOSIS — I83.212 VARICOSE VEINS OF RIGHT LOWER EXTREMITY WITH INFLAMMATION, WITH ULCER OF CALF LIMITED TO BREAKDOWN OF SKIN (HCC): ICD-10-CM

## 2021-05-25 DIAGNOSIS — L97.211 VARICOSE VEINS OF RIGHT LOWER EXTREMITY WITH INFLAMMATION, WITH ULCER OF CALF LIMITED TO BREAKDOWN OF SKIN (HCC): ICD-10-CM

## 2021-05-25 LAB
AFB CULTURE (MYCOBACTERIA): NORMAL
AFB CULTURE (MYCOBACTERIA): NORMAL
AFB SMEAR: NORMAL
AFB SMEAR: NORMAL

## 2021-05-25 PROCEDURE — 11042 DBRDMT SUBQ TIS 1ST 20SQCM/<: CPT

## 2021-05-25 PROCEDURE — 29581 APPL MULTLAYER CMPRN SYS LEG: CPT

## 2021-05-25 PROCEDURE — 6370000000 HC RX 637 (ALT 250 FOR IP): Performed by: PODIATRIST

## 2021-05-25 RX ORDER — LIDOCAINE HYDROCHLORIDE 20 MG/ML
JELLY TOPICAL ONCE
Status: CANCELLED | OUTPATIENT
Start: 2021-05-25 | End: 2021-05-25

## 2021-05-25 RX ORDER — LIDOCAINE 40 MG/G
CREAM TOPICAL ONCE
Status: CANCELLED | OUTPATIENT
Start: 2021-05-25 | End: 2021-05-25

## 2021-05-25 RX ORDER — CLOBETASOL PROPIONATE 0.5 MG/G
OINTMENT TOPICAL ONCE
Status: CANCELLED | OUTPATIENT
Start: 2021-05-25 | End: 2021-05-25

## 2021-05-25 RX ORDER — BACITRACIN ZINC AND POLYMYXIN B SULFATE 500; 1000 [USP'U]/G; [USP'U]/G
OINTMENT TOPICAL ONCE
Status: CANCELLED | OUTPATIENT
Start: 2021-05-25 | End: 2021-05-25

## 2021-05-25 RX ORDER — BACITRACIN, NEOMYCIN, POLYMYXIN B 400; 3.5; 5 [USP'U]/G; MG/G; [USP'U]/G
OINTMENT TOPICAL ONCE
Status: CANCELLED | OUTPATIENT
Start: 2021-05-25 | End: 2021-05-25

## 2021-05-25 RX ORDER — LIDOCAINE 50 MG/G
OINTMENT TOPICAL ONCE
Status: CANCELLED | OUTPATIENT
Start: 2021-05-25 | End: 2021-05-25

## 2021-05-25 RX ORDER — GENTAMICIN SULFATE 1 MG/G
OINTMENT TOPICAL ONCE
Status: CANCELLED | OUTPATIENT
Start: 2021-05-25 | End: 2021-05-25

## 2021-05-25 RX ORDER — LIDOCAINE HYDROCHLORIDE 40 MG/ML
SOLUTION TOPICAL ONCE
Status: COMPLETED | OUTPATIENT
Start: 2021-05-25 | End: 2021-05-25

## 2021-05-25 RX ORDER — GINSENG 100 MG
CAPSULE ORAL ONCE
Status: CANCELLED | OUTPATIENT
Start: 2021-05-25 | End: 2021-05-25

## 2021-05-25 RX ORDER — LIDOCAINE HYDROCHLORIDE 40 MG/ML
SOLUTION TOPICAL ONCE
Status: CANCELLED | OUTPATIENT
Start: 2021-05-25 | End: 2021-05-25

## 2021-05-25 RX ORDER — BETAMETHASONE DIPROPIONATE 0.05 %
OINTMENT (GRAM) TOPICAL ONCE
Status: CANCELLED | OUTPATIENT
Start: 2021-05-25 | End: 2021-05-25

## 2021-05-25 RX ADMIN — LIDOCAINE HYDROCHLORIDE: 40 SOLUTION TOPICAL at 13:11

## 2021-05-25 NOTE — PROGRESS NOTES
Multilayer Compression Wrap   (Not Unna) Below the Knee    NAME:  Baltazar Bound OF BIRTH:  1945  MEDICAL RECORD NUMBER:  [id number]  DATE:  5/25/2021        Removed old Multilayer wrap if present and washed leg with mild soap/water.   Applied moisturizing agent to dry skin as needed.   Applied primary and secondary dressing as ordered     Applied multilayered dressing below the knee to Left lower leg(s)  (2 Layer compression wrap ) .   Instructed patient/caregiver not to remove dressing and to keep it clean and dry.   Instructed patient/caregiver on complications to report to provider, such as pain, numbness in toes, heavy drainage, and slippage of dressing.   Instructed patient on purpose of compression dressing and on activity and exercise recommendations.     Applied per   Guidelines    Electronically signed by Heidi Brennan RN on 5/25/2021 at 2:10 PM

## 2021-05-25 NOTE — PROGRESS NOTES
[address]   Progress Note and Procedure Note      Billy Hernandez  MEDICAL RECORD NUMBER:  [id number]  AGE: 76 y.o. GENDER: female  : 1945  EPISODE DATE:  2021    Subjective:     Chief Complaint   Patient presents with    Wound Check     bilateral legs         HISTORY of PRESENT ILLNESS HPI     Billy Hernandez is a 76 y.o. female who presents today for wound/ulcer evaluation. History of Wound Context: patient presents today for follow up of a DFU s/p I&D. She has been at an [facility] getting wound vac and IV abx therapy. She has been participating in PT and standing for transfers. Wound/Ulcer Pain Timing/Severity: none  Quality of pain: N/A  Severity:  0 / 10   Modifying Factors: None  Associated Signs/Symptoms: none    Ulcer Identification:  Ulcer Type: diabetic and non-healing surgical    Contributing Factors: diabetes and poor glucose control    Acute Wound: N/A    PAST MEDICAL HISTORY        Diagnosis Date    Acute respiratory failure with hypoxia ([address] 2019    Hx of blood clots 2019    PE    Hyperlipidemia     Hypertension     Mallet finger 3/4/2015    PONV (postoperative nausea and vomiting)     Type II or unspecified type diabetes mellitus without mention of complication, not stated as uncontrolled        PAST SURGICAL HISTORY    Past Surgical History:   Procedure Laterality Date    BREAST BIOPSY Right     FOOT DEBRIDEMENT Left 2021    LEFT LOWER EXTREMITY FOOT DEBRIDEMENT INCISION AND DRAINAGE WITH  5TH METATARSAL RESECTION performed by Osbaldo Gutiérrez DPM at [address] Left 2021    LEFT LOWER EXTREMITY EXCISIONAL [address] 11X4 CM WOUND; LEFT FIFTH TOE AMPUTATION, WOUND VAC APPLICAITON performed by Jayde Greenfield DPM at [facility] Left 2021    LEFT LOWER EXTREMITY: INCISION AND DRAINAGE.  EXCISIONAL DEBRIDEMENT OF MUSCLE AND FASCIA [id number] WOUND; LEFT FIFTH TOE AMPUTATION, WOUND VAC APPLICATION    TOE AMPUTATION Right 10/30/2020    PARTIAL AMPUTATION 5TH RAY RIGHT FOOT performed by Jan Butterfield DPM at [address] [place] [address]    Family History   Problem Relation Age of Onset    Diabetes Father     Heart Disease Father     COPD Sister     Alzheimer's Disease Mother     Cancer Brother         lungs    Diabetes Sister     Stroke Sister     Cancer Sister         anal       SOCIAL HISTORY    Social History     Tobacco Use    Smoking status: Former Smoker     Packs/day: 0.25     Years: 38.00     Pack years: 9.50     Types: Cigarettes     Start date: 10/22/1958     Quit date: 1980     Years since quittin.4    Smokeless tobacco: Never Used   Vaping Use    Vaping Use: Never used   Substance Use Topics    Alcohol use: Yes     Alcohol/week: 1.0 standard drinks     Types: 1 Cans of beer per week     Comment: occasional     Drug use: Never       ALLERGIES    Allergies   Allergen Reactions    Latex Rash    Adhesive Tape     Aleve [Naproxen Sodium]      Possible angioedema    Cozaar [Losartan Potassium]      angioedema    Iodine     Lasix [Furosemide] Swelling    Lisinopril      Cough and sore throat     Metformin And Related      Possible angioedema    Nifedipine      swells    Norvasc [Amlodipine Besylate]      Leg swelling    Novolog [Insulin Aspart]      dizzyness    Pioglitazone      edema    Zoloft [Sertraline Hcl]      Vision loss       MEDICATIONS    Current Outpatient Medications on File Prior to Encounter   Medication Sig Dispense Refill    atorvastatin (LIPITOR) 10 MG tablet Take 10 mg by mouth daily      Cholecalciferol (VITAMIN D3) 75 MCG (3000 UT) TABS Take by mouth 1.25mg (15531DE) daily      ammonium lactate (LAC-HYDRIN) 12 % lotion Apply topically as needed for Dry Skin Apply topically as needed.       Multiple Vitamins-Minerals (CEROVITE SENIOR PO) Take 1 tablet by mouth daily      labetalol (NORMODYNE) 100 MG tablet TAKE ONE TABLET BY MOUTH TWICE A  tablet 0    vitamin B-12 (CYANOCOBALAMIN) 100 MCG tablet Take 50 mcg by mouth daily      acetaminophen (TYLENOL) 500 MG tablet Take 500 mg by mouth every 6 hours as needed for Pain      insulin regular (HUMULIN R) 100 UNIT/ML injection INJECT 45 UNITS INTO THE ARM UNDER THE SKIN AT LUNCH AND THEN INJECT 25 UNITS UNDER THE SKIN AT BEDTIME (Patient taking differently: Sliding scale) 1 vial 2    insulin NPH (HUMULIN N;NOVOLIN N) 100 UNIT/ML injection pen Inject 30 Units into the skin 2 times daily (before meals) 5 pen 3    ONETOUCH ULTRA strip USE ONE STRIP TO TEST TWICE A DAY AS NEEDED 100 strip 2    PROMOGRAN WOUND DRESSING MATRIX Apply topically Trim product to match the size of the wound. Apply topically. Moisten lightly with saline. Cover with dry sterile dressing. 1 Package 2    SODIUM CHLORIDE, EXTERNAL, (SALINE WOUND [address] [facility]) 0.9 % SOLN Moisten Promogran wound dressing with saline with each application. 1 Can 0    Handicap Placard MISC by Does not apply route Duration 5 years 1 each 0    Insulin Syringe-Needle U-100 (BD INSULIN SYRINGE U/F) 31G X 5/16\" 0.5 ML MISC USE ONE SYRINGE FOUR TIMES A  each 5    ONETOUCH DELICA LANCETS 90P MISC USE TO TEST BLOOD SUGAR THREE TIMES A  each 0    Blood Pressure KIT Apply 1 applicator topically 2 times daily 1 kit 0    blood glucose monitor strips Test 3-4 times a day & as needed for symptoms of irregular blood glucose. DX: E11.9. Supply the strips that go with her meter and approved by insurance 300 strip 1    Lancets MISC 1 each by Does not apply route daily Test 3-4 times a day & as needed for symptoms of irregular blood glucose. DX: E11.9.  Supply the lancets that go with her meter and approved by insurance 300 each 3    Continuous Blood Gluc Sensor (FREESTYLE VÍCTOR 14 DAY SENSOR) MISC CHECK GLUCOSE 4 TIMES DAILY 2 each 2    B-D ULTRAFINE III SHORT PEN 31G X 8 MM MISC USE WITH INSULIN TWO TIMES A DAY AS DIRECTED 100 each 2    glucose monitoring kit (FREESTYLE) monitoring kit Use a ONE TOUCH 1 kit 0    EPINEPHrine (EPIPEN 2-OSMAR) 0.3 MG/0.3ML SOAJ injection Inject 0.3 mLs into the muscle once for 1 dose. Use as directed for allergic reaction (Patient not taking: Reported on 1/22/2020) 0.3 mL 2    [DISCONTINUED] sitagliptan (JANUVIA) 100 MG tablet Take 1 tablet by mouth daily for 360 days. 30 tablet 3     No current facility-administered medications on file prior to encounter. REVIEW OF SYSTEMS  Review of Systems    Pertinent items are noted in HPI. Review of Systems: A 12 point review of symptoms is unremarkable with the exception of the chief complaint. Patient specifically denies nausea, fever, vomiting, chills, shortness of breath, chest pain, abdominal pain, constipation or difficulty urinating. Objective:      BP (!) 176/63   Pulse 70   Temp 97.3 °F (36.3 °C) (Temporal)   Resp 18   LMP  (LMP Unknown)     Wt Readings from Last 3 Encounters:   04/07/21 256 lb (116.1 kg)   12/29/20 257 lb (116.6 kg)   12/22/20 157 lb (71.2 kg)       PHYSICAL EXAM  Physical Exam    Ulceration noted on the lateral aspect of the left foot. The theraskin is incorporating in to the ulceration. Wound has fibrotic and nonviable tissue that extends down through and includes the subcutaneous tissue. After debridement the wound has a granular base. There is no surrounding erythema, edema, warmth or malodor noted. The wound does not probe or track to bone. The edges of the ulcer are slightly macerated. Edema on the left lower leg is well controlled with compression. 2+ pitting edema noted on the right lower extremity. Patient has brawny pigmentary changes bilaterally with hypertrophic skin changes noted.          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 Performed by: Jenna Grace DPM    Consent obtained:  Yes    Time out taken:  Yes    Pain Control: Anesthetic  Anesthetic: 4% Lidocaine Liquid Topical       Debridement: Excisional Debridement    Using curette the wound(s)/ulcer(s) was/were debrided down through and including the removal of epidermis, dermis and subcutaneous tissue to prepare the wound for TheraSkin application. Devitalized Tissue Debrided:  fibrin and slough    Pre Debridement Measurements:  Are located in the Wound/Ulcer Documentation Flow Sheet    Diabetic/Pressure/Non Pressure Ulcers only:  Ulcer: Diabetic ulcer, fat layer exposed     Wound/Ulcer #: 1    Post Debridement Measurements:  Wound/Ulcer Descriptions are Pre Debridement except measurements:    Negative Pressure Wound Therapy Foot Anterior; Left (Active)   Number of days: 45       Wound 04/27/21 Foot Left;Lateral #1 (Active)   Wound Etiology Diabetic Earl 3 04/27/21 1419   Wound Cleansed Cleansed with saline 05/25/21 1318   Dressing/Treatment Silicone border 63/31/73 1359   Offloading for Diabetic Foot Ulcers Yes (type) 05/11/21 1431   Wound Length (cm) 6.2 cm 05/25/21 1318   Wound Width (cm) 1 cm 05/25/21 1318   Wound Depth (cm) 0.1 cm 05/25/21 1318   Wound Surface Area (cm^2) 6.2 cm^2 05/25/21 1318   Change in Wound Size % (l*w) 48.33 05/25/21 1318   Wound Volume (cm^3) 0.62 cm^3 05/25/21 1318   Wound Healing % 90 05/25/21 1318   Post-Procedure Length (cm) 6.2 cm 05/25/21 1359   Post-Procedure Width (cm) 1 cm 05/25/21 1359   Post-Procedure Depth (cm) 0.1 cm 05/25/21 1359   Post-Procedure Surface Area (cm^2) 6.2 cm^2 05/25/21 1359   Post-Procedure Volume (cm^3) 0.62 cm^3 05/25/21 1359   Distance Tunneling (cm) 0 cm 05/11/21 1340   Tunneling Position ___ O'Clock 0 05/25/21 1318   Undermining Maxium Distance (cm) 0 05/25/21 1318   Wound Assessment Graft 05/25/21 1318   Drainage Amount Moderate 05/25/21 1318   Drainage Description Brown 05/25/21 1318   Odor Mild 05/25/21 1318   Yolis-wound Assessment Maceration 05/25/21 1318   Margins Defined edges 05/25/21 1318   Wound Thickness Description not for Pressure Injury Full thickness 05/25/21 1318   Number of days: 28     Incision Toe (Comment  which one) Anterior;Right (Active)   Number of days: Total Surface Area Debrided:  6.2 sq cm     Estimated Blood Loss:  Minimal    Hemostasis Achieved:  by pressure    Procedural Pain:  0  / 10     Post Procedural Pain:  0 / 10     Response to treatment:  Well tolerated by patient. Plan:   E/ Mx 30 minutes. Ulceration has a good granular base. Patient may stand and weight-bear for transfers. Rx CAM boot. Patient was referred to [facility] prosthetics for fabrication. Once CAM boot available patient may weight bear with PT in the CAM boot. Control a multilayer compression dressing on the right. Treatment Note please see attached Discharge Instructions    Written patient dismissal instructions given to patient and signed by patient or POA. RTC 1 week      Discharge [address] [facility] Physician Orders and Discharge Instructions  [address] [place] [address]. [address] [place] [address]. [place] [address]  Telephone: [phone]      [facility]: [facility] of [address] [place] [address] dressing?: No    **May discontinue the wound vac**    Wound Cleansing:   · With each dressing change, rinse wounds with 0.9% Saline. · Keep wounds dry in the shower.       Topical Treatments:  Do not apply lotions, creams, or ointments to wound bed unless directed as followed:      [x] Apply to yolis-wound: [x] Moisturizing lotion [] Antifungal ointment  [] No-Sting barrier film [] Zinc paste [] Other:  [] Apply to affected limb:     Dressings:           Wound Location: Left Outer Foot      [] Apply Primary Dressing to wound:       [] Foam/Foam with Border(i.e Mepilex) [x] Non-adherent (i.e.Mepitel)   [] Alginate with Silver (i.e. Silvercel)  [] Alginate (i.e. Aquacel)   [] Collagen (i.e. Puracol)   [] Collagen with Silver(i.e. Awilda)     [] Hydrocolloid    [] Hydrafera Blue moistened with saline   [] MediHoney Paste/Gel   [] Hydrogel    [] Endoform      [] Santyl covered with gauze moisten with saline     [] Betadine   [] Bactroban/Mupirocin   [] Polysporin/Neosporin  [] Other:    [] Saline \"wet to dry\" gauze     [] Pack wound loosely with: [] Iodoform   [] Plain Packing  [] Other:      [x] Cover and Secure with:     [x] Dry Gauze  [] ABD [] Cast padding [] Kerlix or Claudette rolled gauze [] Super Absorbent (i.e. Bianca Leanne)     [] Coban  [] Ace Wrap [] Ace Wrap from forefoot to just below the knee [] Paper Tape [] Medipore Tape [] Other:    Avoid contact of tape with skin if possible. [x] Change dressing: [] Daily    [] Every Other Day  [] Three times per week: [] Mon/Wed/Fri  [] Tue/Thurs/Sat   [] Once a week  [x] Do Not Change Dressing [] Other:     Edema Control:  Apply: [] Compression Stocking  [] Left Lower Leg [] Right Lower Leg      [x]  Spandagrip   [] Left Lower Leg [x] Right Lower Leg    Strength:High compression  20-30 mm/Hg   They should be applied to affected leg(s) from mid foot to knee making sure to cover the heel.  Apply every morning immediately when getting up. Remove every night before going to bed. [x] Elevate leg(s) above the level of the heart for 30 minutes 4-5 times a day and/or when sitting. [x] Avoid prolonged standing in one place. Multilayer Compression Wrap:  Type: 2 Layer compression wrap      Applied in Clinic on 5/25/2021 to the :  Left lower leg(s)     · Do not get leg(s) with wrap wet.   · If wraps become too tight call the center or completely remove the wrap. · Elevate leg(s) above the level of the heart when sitting. · Avoid prolonged standing in one place. [x] Skilled Nursing: remove wrap, cleanse affected leg(s) with gentle soap and water, apply moisturizer, and apply dressings as ordered above and reapply compression wraps every: Keep wrap in place. Patient may participate in therapy with the following restrictions for off-Loading:   [x]? ?? Off-loading (keeping weight off as much as you can) when:        [x]? ?? walking       [x]? ?? in bed     []? ?? sitting     []? ?? Total non-weight bearing:  []??? Right Leg  []??? Left Leg                [x]? ?? Partial weight bearing:   []? ?? Right Leg  [x]? ?? Left Leg  (No walking allowed)              []? ?? Heel weight bearing   []? ?? Toe-touch weight bearing  [x]? ?? Transfers only     [x]? ?? No Weight bearing restrictions to Right Lower Extremitiy     [x]? ??Cummings Filler Device:   [x]? ?? Walker        []? ?? Crutches                 [x]? ?? Wheelchair                         []? ?? Roll About              []??? Surgical shoe/Darco          []? ?? Podus Boot(s)        []? ?? Heel Protector Soft Boot(s) - DO NOT WALK WHILE WEARING                         []? ?? Cast/CAM Boot                  []? ?? CROW Boot           []? ?? Other:    Dietary:   Important dietary reminders:  1. Increase Protein intake (i.e. Lean meats, fish, eggs, legumes, and yogurt)  2. No added salt  3. If diabetic, follow a diabetic diet and check glucose prior to meals or as instructed by your physician.    [] SNF: Please have dietician evaluate patient for nutritional needs    Dietary Supplements: [] Derian Headings [] 30ml ProMod/ProStat [] Other:   Take supplements twice a day or as directed as followed:     Return Appointment:   Return Appointment: With Dr Mary Hartley  in  [facility]).   o Schedule weekly until (Date):      [x] Orders placed during your visit:   Orders Placed This Encounter   Procedures    Initiate Outpatient Wound Care Protocol       Your nurse  is:  Fady Velásquez     Electronically signed by Ramya Avalos RN on 5/25/2021 at 2:00 PM     [address]: Should you experience any significant changes in your wound(s) or have questions about your wound care, please contact the [address] [place] [address] at [phone]. Our hours vary so please leave a message. Please give us 24-48 hours to return your call. If you need help with your wounds and cannot wait until we are available, contact your PCP or go to the hospital emergency room. Physician Signature:_______________________    Date: ___________ Time:  ____________    Physician for this visit and orders: Yandy Cardoso DPM    [] Patient unable to sign Discharge Instructions given to ECF/Transportation/POA    Negative Pressure Wound Therapy Foot Anterior; Left (Active)   Number of days: 45       Wound 04/27/21 Foot Left;Lateral #1 (Active)   Wound Etiology Diabetic Earl 3 04/27/21 1419   Wound Cleansed Cleansed with saline 05/25/21 1318   Dressing/Treatment Hydrating gel 05/18/21 1501   Offloading for Diabetic Foot Ulcers Yes (type) 05/11/21 1431   Wound Length (cm) 6.2 cm 05/25/21 1318   Wound Width (cm) 1 cm 05/25/21 1318   Wound Depth (cm) 0.1 cm 05/25/21 1318   Wound Surface Area (cm^2) 6.2 cm^2 05/25/21 1318   Change in Wound Size % (l*w) 48.33 05/25/21 1318   Wound Volume (cm^3) 0.62 cm^3 05/25/21 1318   Wound Healing % 90 05/25/21 1318   Post-Procedure Length (cm) 6.3 cm 05/18/21 1444   Post-Procedure Width (cm) 1.1 cm 05/18/21 1444   Post-Procedure Depth (cm) 0.3 cm 05/18/21 1444   Post-Procedure Surface Area (cm^2) 6.93 cm^2 05/18/21 1444   Post-Procedure Volume (cm^3) 2.08 cm^3 05/18/21 1444   Distance Tunneling (cm) 0 cm 05/11/21 1340   Tunneling Position ___ O'Clock 0 05/25/21 1318   Undermining Maxium Distance (cm) 0 05/25/21 1318   Wound Assessment Graft 05/25/21 1318   Drainage Amount Moderate 05/25/21 1318 Drainage Description Bernadene Massed 05/25/21 1318   Odor Mild 05/25/21 1318   Jody-wound Assessment Maceration 05/25/21 1318   Margins Defined edges 05/25/21 1318   Wound Thickness Description not for Pressure Injury Full thickness 05/25/21 1318   Number of days: 28       Wound 04/27/21 Leg Right; Lower; Posterior #2 (Active)   Wound Etiology Venous 04/27/21 1419   Wound Cleansed Cleansed with saline 05/25/21 1318   Dressing/Treatment Collagen with Ag 05/18/21 1502   Wound Length (cm) 0 cm 05/25/21 1318   Wound Width (cm) 0 cm 05/25/21 1318   Wound Depth (cm) 0 cm 05/25/21 1318   Wound Surface Area (cm^2) 0 cm^2 05/25/21 1318   Change in Wound Size % (l*w) 100 05/25/21 1318   Wound Volume (cm^3) 0 cm^3 05/25/21 1318   Wound Healing % 100 05/25/21 1318   Post-Procedure Length (cm) 0.3 cm 05/18/21 1444   Post-Procedure Width (cm) 0.3 cm 05/18/21 1444   Post-Procedure Depth (cm) 0.3 cm 05/18/21 1444   Post-Procedure Surface Area (cm^2) 0.09 cm^2 05/18/21 1444   Post-Procedure Volume (cm^3) 0.03 cm^3 05/18/21 1444   Distance Tunneling (cm) 0 cm 05/18/21 1343   Undermining Maxium Distance (cm) 0 05/18/21 1343   Wound Assessment Epithelialization 05/25/21 1318   Drainage Amount Small 05/18/21 1343   Drainage Description Serosanguinous 05/18/21 1343   Odor None 05/18/21 1343   Jody-wound Assessment Dry/flaky 05/18/21 1343   Margins Defined edges 05/18/21 1343   Wound Thickness Description not for Pressure Injury Full thickness 05/18/21 1343   Number of days: 28               Electronically signed by Mary Hartley DPM on 5/25/2021 at 2:51 PM

## 2021-06-01 ENCOUNTER — HOSPITAL ENCOUNTER (OUTPATIENT)
Dept: WOUND CARE | Age: 76
Discharge: HOME | End: 2021-06-01
Payer: MEDICARE

## 2021-06-01 VITALS
HEART RATE: 71 BPM | TEMPERATURE: 97.3 F | DIASTOLIC BLOOD PRESSURE: 56 MMHG | RESPIRATION RATE: 18 BRPM | SYSTOLIC BLOOD PRESSURE: 138 MMHG

## 2021-06-01 DIAGNOSIS — L97.422 ULCER OF HEEL AND MIDFOOT, LEFT, WITH FAT LAYER EXPOSED (HCC): Primary | ICD-10-CM

## 2021-06-01 DIAGNOSIS — I83.212 VARICOSE VEINS OF RIGHT LOWER EXTREMITY WITH INFLAMMATION, WITH ULCER OF CALF LIMITED TO BREAKDOWN OF SKIN (HCC): ICD-10-CM

## 2021-06-01 DIAGNOSIS — L97.211 VARICOSE VEINS OF RIGHT LOWER EXTREMITY WITH INFLAMMATION, WITH ULCER OF CALF LIMITED TO BREAKDOWN OF SKIN (HCC): ICD-10-CM

## 2021-06-01 LAB
AFB CULTURE (MYCOBACTERIA): NORMAL
AFB SMEAR: NORMAL

## 2021-06-01 PROCEDURE — 11042 DBRDMT SUBQ TIS 1ST 20SQCM/<: CPT

## 2021-06-01 PROCEDURE — 29581 APPL MULTLAYER CMPRN SYS LEG: CPT

## 2021-06-01 PROCEDURE — 6370000000 HC RX 637 (ALT 250 FOR IP): Performed by: PODIATRIST

## 2021-06-01 PROCEDURE — 15275 SKIN SUB GRAFT FACE/NK/HF/G: CPT

## 2021-06-01 RX ORDER — LIDOCAINE HYDROCHLORIDE 40 MG/ML
SOLUTION TOPICAL ONCE
Status: COMPLETED | OUTPATIENT
Start: 2021-06-01 | End: 2021-06-01

## 2021-06-01 RX ORDER — LIDOCAINE HYDROCHLORIDE 40 MG/ML
SOLUTION TOPICAL ONCE
Status: CANCELLED | OUTPATIENT
Start: 2021-06-01 | End: 2021-06-01

## 2021-06-01 RX ORDER — LIDOCAINE 40 MG/G
CREAM TOPICAL ONCE
Status: CANCELLED | OUTPATIENT
Start: 2021-06-01 | End: 2021-06-01

## 2021-06-01 RX ORDER — BETAMETHASONE DIPROPIONATE 0.05 %
OINTMENT (GRAM) TOPICAL ONCE
Status: CANCELLED | OUTPATIENT
Start: 2021-06-01 | End: 2021-06-01

## 2021-06-01 RX ORDER — LIDOCAINE HYDROCHLORIDE 20 MG/ML
JELLY TOPICAL ONCE
Status: CANCELLED | OUTPATIENT
Start: 2021-06-01 | End: 2021-06-01

## 2021-06-01 RX ORDER — LIDOCAINE 50 MG/G
OINTMENT TOPICAL ONCE
Status: CANCELLED | OUTPATIENT
Start: 2021-06-01 | End: 2021-06-01

## 2021-06-01 RX ORDER — BACITRACIN ZINC AND POLYMYXIN B SULFATE 500; 1000 [USP'U]/G; [USP'U]/G
OINTMENT TOPICAL ONCE
Status: CANCELLED | OUTPATIENT
Start: 2021-06-01 | End: 2021-06-01

## 2021-06-01 RX ORDER — GENTAMICIN SULFATE 1 MG/G
OINTMENT TOPICAL ONCE
Status: CANCELLED | OUTPATIENT
Start: 2021-06-01 | End: 2021-06-01

## 2021-06-01 RX ORDER — GINSENG 100 MG
CAPSULE ORAL ONCE
Status: CANCELLED | OUTPATIENT
Start: 2021-06-01 | End: 2021-06-01

## 2021-06-01 RX ORDER — CLOBETASOL PROPIONATE 0.5 MG/G
OINTMENT TOPICAL ONCE
Status: CANCELLED | OUTPATIENT
Start: 2021-06-01 | End: 2021-06-01

## 2021-06-01 RX ORDER — BACITRACIN, NEOMYCIN, POLYMYXIN B 400; 3.5; 5 [USP'U]/G; MG/G; [USP'U]/G
OINTMENT TOPICAL ONCE
Status: CANCELLED | OUTPATIENT
Start: 2021-06-01 | End: 2021-06-01

## 2021-06-01 RX ADMIN — LIDOCAINE HYDROCHLORIDE: 40 SOLUTION TOPICAL at 13:24

## 2021-06-01 NOTE — PROGRESS NOTES
[address]   Progress Note and Procedure Note      Bertrand Allen  MEDICAL RECORD NUMBER:  [id number]  AGE: 76 y.o. GENDER: female  : 1945  EPISODE DATE:  2021    Subjective:     Chief Complaint   Patient presents with    Wound Check     bilaterl legs         HISTORY of PRESENT ILLNESS HPI     Bertrand Allen is a 76 y.o. female who presents today for wound/ulcer evaluation. History of Wound Context: patient presents today for follow up of a DFU s/p I&D. She has kept her dressing clean and intact. She has been ambulating in CAM boot with PT. Wound/Ulcer Pain Timing/Severity: none  Quality of pain: N/A  Severity:  0 / 10   Modifying Factors: None  Associated Signs/Symptoms: none    Ulcer Identification:  Ulcer Type: diabetic and non-healing surgical    Contributing Factors: diabetes and poor glucose control    Acute Wound: N/A    PAST MEDICAL HISTORY        Diagnosis Date    Acute respiratory failure with hypoxia ([address] 2019    Hx of blood clots 2019    PE    Hyperlipidemia     Hypertension     Mallet finger 3/4/2015    PONV (postoperative nausea and vomiting)     Type II or unspecified type diabetes mellitus without mention of complication, not stated as uncontrolled        PAST SURGICAL HISTORY    Past Surgical History:   Procedure Laterality Date    BREAST BIOPSY Right     FOOT DEBRIDEMENT Left 2021    LEFT LOWER EXTREMITY FOOT DEBRIDEMENT INCISION AND DRAINAGE WITH  5TH METATARSAL RESECTION performed by Jake Allison DPM at [address] Left 2021    LEFT LOWER EXTREMITY EXCISIONAL [address] 11X4 CM WOUND; LEFT FIFTH TOE AMPUTATION, WOUND VAC APPLICAITON performed by Kiarra Viramontes DPM at [facility] Left 2021    LEFT LOWER EXTREMITY: INCISION AND DRAINAGE.  EXCISIONAL DEBRIDEMENT OF MUSCLE AND FASCIA 30ILB9BU WOUND; LEFT FIFTH TOE AMPUTATION, WOUND VAC APPLICATION    TOE AMPUTATION Right 10/30/2020    PARTIAL AMPUTATION 5TH RAY RIGHT FOOT performed by Farrah Fall DPM at [address] [place] [address]    Family History   Problem Relation Age of Onset    Diabetes Father     Heart Disease Father     COPD Sister     Alzheimer's Disease Mother     Cancer Brother         lungs    Diabetes Sister     Stroke Sister     Cancer Sister         anal       SOCIAL HISTORY    Social History     Tobacco Use    Smoking status: Former Smoker     Packs/day: 0.25     Years: 38.00     Pack years: 9.50     Types: Cigarettes     Start date: 10/22/1958     Quit date: 1980     Years since quittin.4    Smokeless tobacco: Never Used   Vaping Use    Vaping Use: Never used   Substance Use Topics    Alcohol use: Yes     Alcohol/week: 1.0 standard drinks     Types: 1 Cans of beer per week     Comment: occasional     Drug use: Never       ALLERGIES    Allergies   Allergen Reactions    Latex Rash    Adhesive Tape     Aleve [Naproxen Sodium]      Possible angioedema    Cozaar [Losartan Potassium]      angioedema    Iodine     Lasix [Furosemide] Swelling    Lisinopril      Cough and sore throat     Metformin And Related      Possible angioedema    Nifedipine      swells    Norvasc [Amlodipine Besylate]      Leg swelling    Novolog [Insulin Aspart]      dizzyness    Pioglitazone      edema    Zoloft [Sertraline Hcl]      Vision loss       MEDICATIONS    Current Outpatient Medications on File Prior to Encounter   Medication Sig Dispense Refill    atorvastatin (LIPITOR) 10 MG tablet Take 10 mg by mouth daily      Cholecalciferol (VITAMIN D3) 75 MCG (3000 UT) TABS Take by mouth 1.25mg (64068NA) daily      ammonium lactate (LAC-HYDRIN) 12 % lotion Apply topically as needed for Dry Skin Apply topically as needed.       Multiple Vitamins-Minerals (CEROVITE SENIOR PO) Take 1 tablet by mouth daily      insulin NPH (HUMULIN N;NOVOLIN N) 100 UNIT/ML injection pen Inject 30 Units into the skin 2 times daily (before meals) 5 pen 3    vitamin B-12 (CYANOCOBALAMIN) 100 MCG tablet Take 50 mcg by mouth daily      acetaminophen (TYLENOL) 500 MG tablet Take 500 mg by mouth every 6 hours as needed for Pain      insulin regular (HUMULIN R) 100 UNIT/ML injection INJECT 45 UNITS INTO THE ARM UNDER THE SKIN AT LUNCH AND THEN INJECT 25 UNITS UNDER THE SKIN AT BEDTIME (Patient taking differently: Sliding scale) 1 vial 2    ONETOUCH ULTRA strip USE ONE STRIP TO TEST TWICE A DAY AS NEEDED 100 strip 2    labetalol (NORMODYNE) 100 MG tablet TAKE ONE TABLET BY MOUTH TWICE A  tablet 0    PROMOGRAN WOUND DRESSING MATRIX Apply topically Trim product to match the size of the wound. Apply topically. Moisten lightly with saline. Cover with dry sterile dressing. 1 Package 2    SODIUM CHLORIDE, EXTERNAL, (SALINE WOUND [address]) 0.9 % SOLN Moisten Promogran wound dressing with saline with each application. 1 Can 0    Handicap Placard MISC by Does not apply route Duration 5 years 1 each 0    Insulin Syringe-Needle U-100 (BD INSULIN SYRINGE U/F) 31G X 5/16\" 0.5 ML MISC USE ONE SYRINGE FOUR TIMES A  each 5    ONETOUCH DELICA LANCETS 52J MISC USE TO TEST BLOOD SUGAR THREE TIMES A  each 0    Blood Pressure KIT Apply 1 applicator topically 2 times daily 1 kit 0    blood glucose monitor strips Test 3-4 times a day & as needed for symptoms of irregular blood glucose. DX: E11.9. Supply the strips that go with her meter and approved by insurance 300 strip 1    Lancets MISC 1 each by Does not apply route daily Test 3-4 times a day & as needed for symptoms of irregular blood glucose. DX: E11.9.  Supply the lancets that go with her meter and approved by insurance 300 each 3    Continuous Blood Gluc Sensor (FREESTYLE VÍCTOR 14 DAY SENSOR) MIS CHECK GLUCOSE 4 TIMES DAILY 2 each 2    B-D ULTRAFINE III SHORT PEN 31G X 8 MM MISC USE WITH INSULIN TWO TIMES A DAY AS DIRECTED 100 each 2    glucose monitoring kit (FREESTYLE) monitoring kit Use a ONE TOUCH 1 kit 0    EPINEPHrine (EPIPEN 2-OSMAR) 0.3 MG/0.3ML SOAJ injection Inject 0.3 mLs into the muscle once for 1 dose. Use as directed for allergic reaction (Patient not taking: Reported on 1/22/2020) 0.3 mL 2    [DISCONTINUED] sitagliptan (JANUVIA) 100 MG tablet Take 1 tablet by mouth daily for 360 days. 30 tablet 3     No current facility-administered medications on file prior to encounter. REVIEW OF SYSTEMS  Review of Systems    Pertinent items are noted in HPI. Review of Systems: A 12 point review of symptoms is unremarkable with the exception of the chief complaint. Patient specifically denies nausea, fever, vomiting, chills, shortness of breath, chest pain, abdominal pain, constipation or difficulty urinating. Objective:      BP (!) 138/56   Pulse 71   Temp 97.3 °F (36.3 °C) (Temporal)   Resp 18   LMP  (LMP Unknown)     Wt Readings from Last 3 Encounters:   04/07/21 256 lb (116.1 kg)   12/29/20 257 lb (116.6 kg)   12/22/20 157 lb (71.2 kg)       PHYSICAL EXAM  Physical Exam    Ulceration noted on the lateral aspect of the left foot. Wound has fibrotic and nonviable tissue that extends down through and includes the subcutaneous tissue. After debridement the wound has a granular base. There is no surrounding erythema, edema, warmth or malodor noted. The wound does not probe or track to bone. The edges of the ulcer are no longer macerated. Edema on the left lower leg is well controlled with compression. 2+ pitting edema noted on the right lower extremity. Patient has brawny pigmentary changes bilaterally with hypertrophic skin changes noted.          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)    Relevant Orders    Initiate Outpatient Wound Care Protocol    Ulcer of heel and midfoot, left, with fat layer exposed ([address] - Primary    Relevant Orders Tunneling Position ___ O'Clock 0 05/25/21 1318   Undermining Maxium Distance (cm) 0 06/01/21 1331   Wound Assessment Other (Comment) 06/01/21 1331   Drainage Amount Moderate 06/01/21 1331   Drainage Description Brown 06/01/21 1331   Odor None 06/01/21 1331   Jody-wound Assessment Dry/flaky 06/01/21 1331   Margins Defined edges 06/01/21 1331   Wound Thickness Description not for Pressure Injury Full thickness 05/25/21 1318   Number of days: 35     Incision Toe (Comment  which one) Anterior;Right (Active)   Number of days: Total Surface Area Debrided:  6.93 sq cm     Estimated Blood Loss:  Minimal    Hemostasis Achieved:  by pressure    Procedural Pain:  0  / 10     Post Procedural Pain:  0 / 10     Response to treatment:  Well tolerated by patient. Procedure:  Skin Substitute Application    Performed by: Eunice Andrade DPM    Ulcer Type: diabetic    Consent obtained: Yes    Time out taken: Yes    Product Utilized: TheraSkin 6 sq/cm     Fenestrated: Yes    Mesher Utilized: No    Instrument(s) scissors and forceps    Skin Substitute was Applied to Ulcer Number(s):    Ulcer #: 1    Negative Pressure Wound Therapy Foot Anterior; Left (Active)   Number of days: 53       Wound 04/27/21 Foot Left;Lateral #1 (Active)   Wound Image   06/01/21 1331   Wound Etiology Diabetic Earl 3 04/27/21 1419   Wound Cleansed Cleansed with saline 06/01/21 1331   Dressing/Treatment Hydrating gel 06/01/21 1422   Offloading for Diabetic Foot Ulcers Yes (type) 05/11/21 1431   Wound Length (cm) 6.2 cm 06/01/21 1331   Wound Width (cm) 1 cm 06/01/21 1331   Wound Depth (cm) 0.1 cm 06/01/21 1331   Wound Surface Area (cm^2) 6.2 cm^2 06/01/21 1331   Change in Wound Size % (l*w) 48.33 06/01/21 1331   Wound Volume (cm^3) 0.62 cm^3 06/01/21 1331   Wound Healing % 90 06/01/21 1331   Post-Procedure Length (cm) 6.3 cm 06/01/21 1405   Post-Procedure Width (cm) 1.1 cm 06/01/21 1405   Post-Procedure Depth (cm) 0.3 cm 06/01/21 6154 Post-Procedure Surface Area (cm^2) 6.93 cm^2 06/01/21 1405   Post-Procedure Volume (cm^3) 2.08 cm^3 06/01/21 1405   Distance Tunneling (cm) 0 cm 06/01/21 1331   Tunneling Position ___ O'Clock 0 05/25/21 1318   Undermining Maxium Distance (cm) 0 06/01/21 1331   Wound Assessment Other (Comment) 06/01/21 1331   Drainage Amount Moderate 06/01/21 1331   Drainage Description Brown 06/01/21 1331   Odor None 06/01/21 1331   Jody-wound Assessment Dry/flaky 06/01/21 1331   Margins Defined edges 06/01/21 1331   Wound Thickness Description not for Pressure Injury Full thickness 05/25/21 1318   Number of days: 35     Incision Toe (Comment  which one) Anterior;Right (Active)   Number of days:        Diabetic/Pressure/Non Pressure Ulcers:  Ulcer:   Diabetic ulcer, fat layer exposed      Total Surface Area of Ulcer(s) Covered 6.93 sq/cm    Was the Product Layered  No     Amount of Product Applied 6 sq/cm      Amount of Product Wasted 0 sq/cm     Reason for Waste: N/A     Surgically Fixated: Yes    Secured With: Steri Strips and Silicone Dressing     Procedural Pain: 0/10     Post Procedural Pain: 0 / 10    Response to Treatment: Well tolerated by patient. This was the second application of Theraskin        Plan:   E/ Mx 30 minutes. Ulceration has a good granular base. A TheraSkin was applied and surgically fixated. Patient was instructed to keep her dressing clean dry and intact. Patient may stand and weight-bear in CAM boot as tolerated with physical therapy. Control edema with ACE compression on the left and a multilayer compression dressing on the right. Treatment Note please see attached Discharge Instructions    Written patient dismissal instructions given to patient and signed by patient or POA. RTC 1 week      Discharge [id number] [facility] Physician Orders and Discharge Instructions  [address] [address] [address] [address] [facility]  Telephone: [phone] FAX [phone] [address] [facility] dressing?: No    Wound Cleansing:   · With each dressing change, rinse wounds with 0.9% Saline. · Keep wounds dry in the shower. Topical Treatments:  Do not apply lotions, creams, or ointments to wound bed unless directed as followed:      [] Apply to yolis-wound: [] Moisturizing lotion [] Antifungal ointment  [] No-Sting barrier film [] Zinc paste [] Other:  [] Apply to affected limb:     Dressings:           Wound Location: Left Foot     [] Apply Primary Dressing to wound:       [] Foam/Foam with Border(i.e Mepilex) [x] Non-adherent (i.e.Mepitel)   [] Alginate with Silver (i.e. Silvercel)  [] Alginate (i.e. Aquacel)   [] Collagen (i.e. Puracol)   [] Collagen with Silver(i.e. Awilda)     [] Hydrocolloid    [] Hydrafera Blue moistened with saline   [] MediHoney Paste/Gel   [x] Hydrogel    [] Endoform      [] Santyl covered with gauze moisten with saline     [] Betadine   [] Bactroban/Mupirocin   [] Polysporin/Neosporin  [] Other:    [] Saline \"wet to dry\" gauze     [] Pack wound loosely with: [] Iodoform   [] Plain Packing  [] Other:      [x] Cover and Secure with:     [x] Dry Gauze  [] ABD [] Cast padding [] Kerlix or Claudette rolled gauze [] Super Absorbent (i.e. Jearl Schooner)     [] Coban  [] Ace Wrap [] Ace Wrap from forefoot to just below the knee [] Paper Tape [] Medipore Tape [] Other:    Avoid contact of tape with skin if possible. [x] Change dressing: [] Daily    [] Every Other Day  [] Three times per week: [] Mon/Wed/Fri  [] Tue/Thurs/Sat   [] Once a week  [x] Do Not Change Dressing [] Other:      Application of a biologic skin substitute was done in the wound care center on 6/1/2021:   Theraskin Wound Location: Left Foot  This is a highly specialized wound care dressing that is intended to enhance your own bodies ability to increase growth factors and other healing abilities.   Please leave the dressing Boot [] Other:    Dietary:   Important dietary reminders:  1. Increase Protein intake (i.e. Lean meats, fish, eggs, legumes, and yogurt)  2. No added salt  3. If diabetic, follow a diabetic diet and check glucose prior to meals or as instructed by your physician.    [] SNF: Please have dietician evaluate patient for nutritional needs    Dietary Supplements: [] Derian Headings [] 30ml ProMod/ProStat [] Other:   Take supplements twice a day or as directed as followed:     Return Appointment:   Return Appointment: With Dr Mary Hartley  in  [facility]). o Schedule weekly until (Date):      [x] Orders placed during your visit:   Orders Placed This Encounter   Procedures    Initiate Outpatient Wound Care Protocol       Your nurse  is:  Sae Kent     Electronically signed by Yesika Franklin RN on 6/1/2021 at 2:13 PM     [address] [facility] Information: Should you experience any significant changes in your wound(s) or have questions about your wound care, please contact the [address] [place] [address] at [phone]. Our hours vary so please leave a message. Please give us 24-48 hours to return your call. If you need help with your wounds and cannot wait until we are available, contact your PCP or go to the hospital emergency room. Physician Signature:_______________________    Date: ___________ Time:  ____________    Physician for this visit and orders: Mary Hartley DPM    [] Patient unable to sign Discharge Instructions given to ECF/Transportation/POA    Negative Pressure Wound Therapy Foot Anterior; Left (Active)   Number of days: 52       Wound 04/27/21 Foot Left;Lateral #1 (Active)   Wound Image   06/01/21 1331   Wound Etiology Diabetic Earl 3 04/27/21 1419   Wound Cleansed Cleansed with saline 06/01/21 1331   Dressing/Treatment Silicone border 28/79/13 1359   Offloading for Diabetic Foot Ulcers Yes (type) 05/11/21 1431   Wound Length (cm) 6.2 cm 06/01/21 1331   Wound Width (cm) 1 cm 06/01/21 1331   Wound Depth (cm) 0.1 cm 06/01/21 1331   Wound Surface Area (cm^2) 6.2 cm^2 06/01/21 1331   Change in Wound Size % (l*w) 48.33 06/01/21 1331   Wound Volume (cm^3) 0.62 cm^3 06/01/21 1331   Wound Healing % 90 06/01/21 1331   Post-Procedure Length (cm) 6.3 cm 06/01/21 1405   Post-Procedure Width (cm) 1.1 cm 06/01/21 1405   Post-Procedure Depth (cm) 0.3 cm 06/01/21 1405   Post-Procedure Surface Area (cm^2) 6.93 cm^2 06/01/21 1405   Post-Procedure Volume (cm^3) 2.08 cm^3 06/01/21 1405   Distance Tunneling (cm) 0 cm 06/01/21 1331   Tunneling Position ___ O'Clock 0 05/25/21 1318   Undermining Maxium Distance (cm) 0 06/01/21 1331   Wound Assessment Other (Comment) 06/01/21 1331   Drainage Amount Moderate 06/01/21 1331   Drainage Description Brown 06/01/21 1331   Odor None 06/01/21 1331   Jody-wound Assessment Dry/flaky 06/01/21 1331   Margins Defined edges 06/01/21 1331   Wound Thickness Description not for Pressure Injury Full thickness 05/25/21 1318   Number of days: 35     Incision Toe (Comment  which one) Anterior;Right (Active)   Number of days:               Electronically signed by Elizabeth Beltrán DPM on 6/1/2021 at 5:15 PM

## 2021-06-01 NOTE — PLAN OF CARE
TheraSkin Treatment Note      Goal:  Patient will receive safe and proper application of skin substitute. Patient will comply with caring for dressing, offloading and reporting complications. · [x] Expiration date of TheraSkin checked immediately prior to use.  [x] Package intact prior to use and no damage noted.  [x] Transport temperature controlled and acceptable. TheraSkin was prepared for application by removing from package. TheraSkin was rinsed 2 times in room temperature normal saline for 5 seconds each time. A 2nd saline rinse was left on the TheraSkin until the provider was ready to apply it within 120 minutes of thawing. White side placed against ulcer bed. · [x] Theraskin was applied to wound # 1 and affixed with steri-strips by the provider. · [x] Secondary dressing applied as ordered. · [x] Patient/caregiver was instructed not to remove dressing and to keep it clean and dry. See AVS for further instructions given to patient/caregiver. Theraskin may be applied a total of 10 times per wound over a 12 week period. Additionally Theraskin may only be used every 12 months per wound. Date of first application of Theraskin for this current wound is May 18, 2021.      Application # 2           Guidelines followed      Electronically signed by Lady Dixon RN on 6/1/2021 at 2:12 PM

## 2021-06-08 ENCOUNTER — HOSPITAL ENCOUNTER (OUTPATIENT)
Dept: WOUND CARE | Age: 76
Discharge: HOME | End: 2021-06-08
Payer: MEDICARE

## 2021-06-08 VITALS
TEMPERATURE: 97 F | RESPIRATION RATE: 18 BRPM | SYSTOLIC BLOOD PRESSURE: 150 MMHG | HEART RATE: 70 BPM | DIASTOLIC BLOOD PRESSURE: 58 MMHG

## 2021-06-08 DIAGNOSIS — L97.211 VARICOSE VEINS OF RIGHT LOWER EXTREMITY WITH INFLAMMATION, WITH ULCER OF CALF LIMITED TO BREAKDOWN OF SKIN (HCC): ICD-10-CM

## 2021-06-08 DIAGNOSIS — L97.422 ULCER OF HEEL AND MIDFOOT, LEFT, WITH FAT LAYER EXPOSED (HCC): Primary | ICD-10-CM

## 2021-06-08 DIAGNOSIS — I83.212 VARICOSE VEINS OF RIGHT LOWER EXTREMITY WITH INFLAMMATION, WITH ULCER OF CALF LIMITED TO BREAKDOWN OF SKIN (HCC): ICD-10-CM

## 2021-06-08 PROCEDURE — 6370000000 HC RX 637 (ALT 250 FOR IP): Performed by: PODIATRIST

## 2021-06-08 PROCEDURE — 11042 DBRDMT SUBQ TIS 1ST 20SQCM/<: CPT

## 2021-06-08 PROCEDURE — 29581 APPL MULTLAYER CMPRN SYS LEG: CPT

## 2021-06-08 RX ORDER — LIDOCAINE 40 MG/G
CREAM TOPICAL ONCE
Status: CANCELLED | OUTPATIENT
Start: 2021-06-08 | End: 2021-06-08

## 2021-06-08 RX ORDER — LIDOCAINE HYDROCHLORIDE 20 MG/ML
JELLY TOPICAL ONCE
Status: CANCELLED | OUTPATIENT
Start: 2021-06-08 | End: 2021-06-08

## 2021-06-08 RX ORDER — BACITRACIN ZINC AND POLYMYXIN B SULFATE 500; 1000 [USP'U]/G; [USP'U]/G
OINTMENT TOPICAL ONCE
Status: CANCELLED | OUTPATIENT
Start: 2021-06-08 | End: 2021-06-08

## 2021-06-08 RX ORDER — LIDOCAINE 50 MG/G
OINTMENT TOPICAL ONCE
Status: CANCELLED | OUTPATIENT
Start: 2021-06-08 | End: 2021-06-08

## 2021-06-08 RX ORDER — LIDOCAINE HYDROCHLORIDE 40 MG/ML
SOLUTION TOPICAL ONCE
Status: CANCELLED | OUTPATIENT
Start: 2021-06-08 | End: 2021-06-08

## 2021-06-08 RX ORDER — GINSENG 100 MG
CAPSULE ORAL ONCE
Status: CANCELLED | OUTPATIENT
Start: 2021-06-08 | End: 2021-06-08

## 2021-06-08 RX ORDER — BETAMETHASONE DIPROPIONATE 0.05 %
OINTMENT (GRAM) TOPICAL ONCE
Status: CANCELLED | OUTPATIENT
Start: 2021-06-08 | End: 2021-06-08

## 2021-06-08 RX ORDER — GENTAMICIN SULFATE 1 MG/G
OINTMENT TOPICAL ONCE
Status: CANCELLED | OUTPATIENT
Start: 2021-06-08 | End: 2021-06-08

## 2021-06-08 RX ORDER — CLOBETASOL PROPIONATE 0.5 MG/G
OINTMENT TOPICAL ONCE
Status: CANCELLED | OUTPATIENT
Start: 2021-06-08 | End: 2021-06-08

## 2021-06-08 RX ORDER — BACITRACIN, NEOMYCIN, POLYMYXIN B 400; 3.5; 5 [USP'U]/G; MG/G; [USP'U]/G
OINTMENT TOPICAL ONCE
Status: CANCELLED | OUTPATIENT
Start: 2021-06-08 | End: 2021-06-08

## 2021-06-08 RX ORDER — LIDOCAINE HYDROCHLORIDE 40 MG/ML
SOLUTION TOPICAL ONCE
Status: COMPLETED | OUTPATIENT
Start: 2021-06-08 | End: 2021-06-08

## 2021-06-08 RX ADMIN — LIDOCAINE HYDROCHLORIDE: 40 SOLUTION TOPICAL at 13:41

## 2021-06-08 NOTE — PROGRESS NOTES
Multilayer Compression Wrap   (Not Unna) Below the Knee    NAME:  Antonella Duggan OF BIRTH:  1945  MEDICAL RECORD NUMBER:  [id number]  DATE:  6/8/2021        Removed old Multilayer wrap if present and washed leg with mild soap/water.   Applied moisturizing agent to dry skin as needed.   Applied primary and secondary dressing as ordered     Applied multilayered dressing below the knee to Left lower leg(s)  (2 Layer compression wrap ) .   Instructed patient/caregiver not to remove dressing and to keep it clean and dry.   Instructed patient/caregiver on complications to report to provider, such as pain, numbness in toes, heavy drainage, and slippage of dressing.   Instructed patient on purpose of compression dressing and on activity and exercise recommendations.     Applied per   Guidelines    Electronically signed by Arline Phillip RN on 6/8/2021 at 2:19 PM

## 2021-06-08 NOTE — PROGRESS NOTES
[address]   Progress Note and Procedure Note      Dana Morelos  MEDICAL RECORD NUMBER:  [id number]  AGE: 76 y.o. GENDER: female  : 1945  EPISODE DATE:  2021    Subjective:     Chief Complaint   Patient presents with    Wound Check     bilateral lower extremity         HISTORY of PRESENT ILLNESS HPI     Dana Morelos is a 76 y.o. female who presents today for wound/ulcer evaluation. History of Wound Context: patient presents today for follow up of a DFU s/p I&D. She has kept her dressing clean and intact. She has been ambulating in CAM boot with PT. Wound/Ulcer Pain Timing/Severity: none  Quality of pain: N/A  Severity:  0 / 10   Modifying Factors: None  Associated Signs/Symptoms: none    Ulcer Identification:  Ulcer Type: diabetic and non-healing surgical    Contributing Factors: diabetes and poor glucose control    Acute Wound: N/A    PAST MEDICAL HISTORY        Diagnosis Date    Acute respiratory failure with hypoxia ([address] 2019    Hx of blood clots 2019    PE    Hyperlipidemia     Hypertension     Mallet finger 3/4/2015    PONV (postoperative nausea and vomiting)     Type II or unspecified type diabetes mellitus without mention of complication, not stated as uncontrolled        PAST SURGICAL HISTORY    Past Surgical History:   Procedure Laterality Date    BREAST BIOPSY Right     FOOT DEBRIDEMENT Left 2021    LEFT LOWER EXTREMITY FOOT DEBRIDEMENT INCISION AND DRAINAGE WITH  5TH METATARSAL RESECTION performed by Lori Mcleod DPM at [address] Left 2021    LEFT LOWER EXTREMITY EXCISIONAL [address] 11X4 CM WOUND; LEFT FIFTH TOE AMPUTATION, WOUND VAC APPLICAITON performed by Johnnie Edmonds DPM at [address] [place] [address] Left 2021    LEFT LOWER EXTREMITY: INCISION AND DRAINAGE.  EXCISIONAL DEBRIDEMENT OF MUSCLE AND FASCIA [id number] WOUND; LEFT FIFTH TOE AMPUTATION, WOUND VAC APPLICATION    TOE AMPUTATION Right 10/30/2020    PARTIAL AMPUTATION 5TH RAY RIGHT FOOT performed by Della Michele DPM at [address] [place] [address]    Family History   Problem Relation Age of Onset    Diabetes Father     Heart Disease Father     COPD Sister     Alzheimer's Disease Mother     Cancer Brother         lungs    Diabetes Sister     Stroke Sister     Cancer Sister         anal       SOCIAL HISTORY    Social History     Tobacco Use    Smoking status: Former Smoker     Packs/day: 0.25     Years: 38.00     Pack years: 9.50     Types: Cigarettes     Start date: 10/22/1958     Quit date: 1980     Years since quittin.4    Smokeless tobacco: Never Used   Vaping Use    Vaping Use: Never used   Substance Use Topics    Alcohol use: Yes     Alcohol/week: 1.0 standard drinks     Types: 1 Cans of beer per week     Comment: occasional     Drug use: Never       ALLERGIES    Allergies   Allergen Reactions    Latex Rash    Adhesive Tape     Aleve [Naproxen Sodium]      Possible angioedema    Cozaar [Losartan Potassium]      angioedema    Iodine     Lasix [Furosemide] Swelling    Lisinopril      Cough and sore throat     Metformin And Related      Possible angioedema    Nifedipine      swells    Norvasc [Amlodipine Besylate]      Leg swelling    Novolog [Insulin Aspart]      dizzyness    Pioglitazone      edema    Zoloft [Sertraline Hcl]      Vision loss       MEDICATIONS    Current Outpatient Medications on File Prior to Encounter   Medication Sig Dispense Refill    atorvastatin (LIPITOR) 10 MG tablet Take 10 mg by mouth daily      ammonium lactate (LAC-HYDRIN) 12 % lotion Apply topically as needed for Dry Skin Apply topically as needed.       insulin NPH (HUMULIN N;NOVOLIN N) 100 UNIT/ML injection pen Inject 30 Units into the skin 2 times daily (before meals) 5 pen 3    labetalol (NORMODYNE) 100 MG tablet TAKE ONE TABLET BY MOUTH TWICE A  tablet 0    vitamin B-12 (CYANOCOBALAMIN) 100 MCG tablet Take 50 mcg by mouth daily      acetaminophen (TYLENOL) 500 MG tablet Take 500 mg by mouth every 6 hours as needed for Pain      insulin regular (HUMULIN R) 100 UNIT/ML injection INJECT 45 UNITS INTO THE ARM UNDER THE SKIN AT LUNCH AND THEN INJECT 25 UNITS UNDER THE SKIN AT BEDTIME (Patient taking differently: Sliding scale) 1 vial 2    Cholecalciferol (VITAMIN D3) 75 MCG (3000 UT) TABS Take by mouth 1.25mg (17180LV) daily      Multiple Vitamins-Minerals (CEROVITE SENIOR PO) Take 1 tablet by mouth daily      ONETOUCH ULTRA strip USE ONE STRIP TO TEST TWICE A DAY AS NEEDED 100 strip 2    PROMOGRAN WOUND DRESSING MATRIX Apply topically Trim product to match the size of the wound. Apply topically. Moisten lightly with saline. Cover with dry sterile dressing. 1 Package 2    SODIUM CHLORIDE, EXTERNAL, (SALINE WOUND [address]) 0.9 % SOLN Moisten Promogran wound dressing with saline with each application. 1 Can 0    Handicap Placard MISC by Does not apply route Duration 5 years 1 each 0    Insulin Syringe-Needle U-100 (BD INSULIN SYRINGE U/F) 31G X 5/16\" 0.5 ML MISC USE ONE SYRINGE FOUR TIMES A  each 5    ONETOUCH DELICA LANCETS 29Y MISC USE TO TEST BLOOD SUGAR THREE TIMES A  each 0    Blood Pressure KIT Apply 1 applicator topically 2 times daily 1 kit 0    blood glucose monitor strips Test 3-4 times a day & as needed for symptoms of irregular blood glucose. DX: E11.9. Supply the strips that go with her meter and approved by insurance 300 strip 1    Lancets MISC 1 each by Does not apply route daily Test 3-4 times a day & as needed for symptoms of irregular blood glucose. DX: E11.9.  Supply the lancets that go with her meter and approved by insurance 300 each 3    Continuous Blood Gluc Sensor (FREESTYLE VÍCTOR 14 DAY SENSOR) MISC CHECK GLUCOSE 4 TIMES DAILY 2 each 2    B-D ULTRAFINE III SHORT PEN 31G X 8 MM MISC USE WITH INSULIN TWO TIMES A DAY AS DIRECTED 100 each 2    glucose monitoring kit (FREESTYLE) monitoring kit Use a ONE TOUCH 1 kit 0    EPINEPHrine (EPIPEN 2-OSMAR) 0.3 MG/0.3ML SOAJ injection Inject 0.3 mLs into the muscle once for 1 dose. Use as directed for allergic reaction (Patient not taking: Reported on 1/22/2020) 0.3 mL 2    [DISCONTINUED] sitagliptan (JANUVIA) 100 MG tablet Take 1 tablet by mouth daily for 360 days. 30 tablet 3     No current facility-administered medications on file prior to encounter. REVIEW OF SYSTEMS  Review of Systems    Pertinent items are noted in HPI. Review of Systems: A 12 point review of symptoms is unremarkable with the exception of the chief complaint. Patient specifically denies nausea, fever, vomiting, chills, shortness of breath, chest pain, abdominal pain, constipation or difficulty urinating. Objective:      BP (!) 150/58   Pulse 70   Temp 97 °F (36.1 °C) (Temporal)   Resp 18   LMP  (LMP Unknown)     Wt Readings from Last 3 Encounters:   04/07/21 256 lb (116.1 kg)   12/29/20 257 lb (116.6 kg)   12/22/20 157 lb (71.2 kg)       PHYSICAL EXAM  Physical Exam    Ulceration noted on the lateral aspect of the left foot. Wound has fibrotic and nonviable tissue that extends down through and includes the subcutaneous tissue. After debridement the wound has a granular base. There is no surrounding erythema, edema, warmth or malodor noted. The wound does not probe or track to bone. The edges of the ulcer are no longer macerated. Edema on the left lower leg is well controlled with compression. 2+ pitting edema noted on the right lower extremity. Patient has brawny pigmentary changes bilaterally with hypertrophic skin changes noted.          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 Performed by: Meche Miranda DPM    Consent obtained:  Yes    Time out taken:  Yes    Pain Control: Anesthetic  Anesthetic: 4% Lidocaine Liquid Topical       Debridement: Excisional Debridement    Using curette the wound(s)/ulcer(s) was/were debrided down through and including the removal of epidermis, dermis and subcutaneous tissue to prepare the wound for TheraSkin application. Devitalized Tissue Debrided:  fibrin and slough    Pre Debridement Measurements:  Are located in the Wound/Ulcer Documentation Flow Sheet    Diabetic/Pressure/Non Pressure Ulcers only:  Ulcer: Diabetic ulcer, fat layer exposed     Wound/Ulcer #: 1    Post Debridement Measurements:  Wound/Ulcer Descriptions are Pre Debridement except measurements:    Negative Pressure Wound Therapy Foot Anterior; Left (Active)   Number of days: 59       Wound 04/27/21 Foot Left;Lateral #1 (Active)   Wound Image   06/01/21 1331   Wound Etiology Diabetic Earl 3 04/27/21 1419   Wound Cleansed Cleansed with saline 06/08/21 1325   Dressing/Treatment Hydrating gel 06/08/21 1419   Offloading for Diabetic Foot Ulcers Yes (type) 05/11/21 1431   Wound Length (cm) 6.2 cm 06/08/21 1325   Wound Width (cm) 1 cm 06/08/21 1325   Wound Depth (cm) 0.1 cm 06/08/21 1325   Wound Surface Area (cm^2) 6.2 cm^2 06/08/21 1325   Change in Wound Size % (l*w) 48.33 06/08/21 1325   Wound Volume (cm^3) 0.62 cm^3 06/08/21 1325   Wound Healing % 90 06/08/21 1325   Post-Procedure Length (cm) 6.3 cm 06/08/21 1408   Post-Procedure Width (cm) 1.1 cm 06/08/21 1408   Post-Procedure Depth (cm) 0.3 cm 06/08/21 1408   Post-Procedure Surface Area (cm^2) 6.93 cm^2 06/08/21 1408   Post-Procedure Volume (cm^3) 2.08 cm^3 06/08/21 1408   Distance Tunneling (cm) 0 cm 06/08/21 1325   Tunneling Position ___ O'Clock 0 05/25/21 1318   Undermining Maxium Distance (cm) 0 06/08/21 1325   Wound Assessment Graft 06/08/21 1325   Drainage Amount Small 06/08/21 1325   Drainage Description Brown 06/08/21 1325   Odor Mild 06/08/21 1325   Yolis-wound Assessment Maceration 06/08/21 1325   Margins Defined edges 06/08/21 1325   Wound Thickness Description not for Pressure Injury Full thickness 06/08/21 1325   Number of days: 42     Incision Toe (Comment  which one) Anterior;Right (Active)   Number of days: Total Surface Area Debrided:  6.93 sq cm     Estimated Blood Loss:  Minimal    Hemostasis Achieved:  by pressure    Procedural Pain:  0  / 10     Post Procedural Pain:  0 / 10     Response to treatment:  Well tolerated by patient. Plan:   E/ Mx 30 minutes. Patient was instructed to keep her dressing clean dry and intact. Patient may stand and weight-bear in CAM boot as tolerated with physical therapy. Treatment Note please see attached Discharge Instructions    Written patient dismissal instructions given to patient and signed by patient or POA. RTC 1 week      Discharge [address] [facility] Physician Orders and Discharge Instructions  [address] [place] [address] [address]. [address] [place] [address]. [place] [address]  Telephone: [phone]      [facility]: [address] dressing?: no    Wound Cleansing:   · With each dressing change, rinse wounds with 0.9% Saline. · Keep wounds dry in the shower. Vashe wound cleanser:  [] Instructions for Vashe Wash solution ONLY: Apply enough Vashe to soak a piece of gauze and place on wound bed for 5-10 minutes. DO NOT rinse after Vashe has been applied. Follow dressing application as instructed below.     Topical Treatments:  Do not apply lotions, creams, or ointments to wound bed unless directed as followed:      [x] Apply to yolis-wound: [x] Moisturizing lotion [] Antifungal ointment  [] No-Sting barrier film [] Zinc paste [] Other:  [] Apply to affected limb:     Dressings:           Wound Location: Left Foot     [] Apply Primary Dressing to wound:       [] Foam/Foam with Border(i.e Mepilex) [x] Non-adherent (i.e.Mepitel)   [] Alginate with Silver (i.e. Silvercel)  [] Alginate (i.e. Aquacel)   [] Collagen (i.e. Puracol)   [] Collagen with Silver(i.e. Awilda)     [] Hydrocolloid    [] Hydrafera Blue moistened with saline   [] MediHoney Paste/Gel   [x] Hydrogel    [] Endoform      [] Santyl covered with gauze moisten with saline     [] Betadine   [] Bactroban/Mupirocin   [] Polysporin/Neosporin  [] Other:    [] Saline \"wet to dry\" gauze     [] Pack wound loosely with: [] Iodoform   [] Plain Packing  [] Other:      [x] Cover and Secure with:     [x] Dry Gauze  [] ABD [] Cast padding [] Kerlix or Claudette rolled gauze [] Super Absorbent (i.e. Optilock)     [] Coban  [] Ace Wrap [] Ace Wrap from forefoot to just below the knee [] Paper Tape [] Medipore Tape [] Other:    Avoid contact of tape with skin if possible. [x] Change dressing: [] Daily    [] Every Other Day  [] Three times per week: [] Mon/Wed/Fri  [] Tue/Thurs/Sat   [] Once a week  [x] Do Not Change Dressing [] Other:     Edema Control:  Apply: [] Compression Stocking  [] Left Lower Leg [] Right Lower Leg      [x]  Spandagrip   [] Left Lower Leg [x] Right Lower Leg    Strength:High compression  20-30 mm/Hg   They should be applied to affected leg(s) from mid foot to knee making sure to cover the heel.  Apply every morning immediately when getting up. Remove every night before going to bed. [x] Elevate leg(s) above the level of the heart for 30 minutes 4-5 times a day and/or when sitting. [x] Avoid prolonged standing in one place.      Multilayer Compression Wrap:  Type: 2 Layer compression wrap      Applied in Clinic on 6/8/2021 to the :  Left lower leg(s)     · Do not get leg(s) with wrap wet. · If wraps become too tight call the center or completely remove the wrap. · Elevate leg(s) above the level of the heart when sitting. · Avoid prolonged standing in one place. [] Skilled Nursing: remove wrap, cleanse affected leg(s) with gentle soap and water, apply moisturizer, and apply dressings as ordered above and reapply compression wraps every:     Patient may participate in therapy with the following restrictions for off-Loading:   [x] Off-loading (keeping weight off as much as you can) when: [] walking  [x] in bed [x] sitting    [] Total non-weight bearing:  [] Right Leg  [] Left Leg     [] Partial weight bearing:   [] Right Leg  [] Left Leg     [] Heel weight bearing   [] Toe-touch weight bearing  [] Transfers only    [x] No Weight bearing restrictions    [x] Assistive Device: [x] Walker [] Crutches   [] Wheelchair   [] Roll About   [] Surgical shoe/Darco [] Podus Boot(s)    [] Heel Protector Soft Boot(s) - DO NOT WALK WHILE WEARING    [x] Cast/CAM Boot  [] CROW Boot [] Other:    Dietary:   Important dietary reminders:  1. Increase Protein intake (i.e. Lean meats, fish, eggs, legumes, and yogurt)  2. No added salt  3. If diabetic, follow a diabetic diet and check glucose prior to meals or as instructed by your physician.    [] SNF: Please have dietician evaluate patient for nutritional needs    Dietary Supplements: [] Carmella Sheldon [] 30ml ProMod/ProStat [] Other:   Take supplements twice a day or as directed as followed:     Return Appointment:   Return Appointment: With Dr Gabby Santana  in  [facility]).   o Schedule weekly until (Date):      [x] Orders placed during your visit:   Orders Placed This Encounter   Procedures    Initiate Outpatient Wound Care Protocol       Your nurse  is:  Daniel Blackwell     Electronically signed by Lady Dixon RN on 6/8/2021 at 2:17 PM     [facility] Information: Should you experience any significant changes in your wound(s) or have questions about your wound care, please contact the [address] [place] [address] at [phone]. Our hours vary so please leave a message. Please give us 24-48 hours to return your call. If you need help with your wounds and cannot wait until we are available, contact your PCP or go to the hospital emergency room. Physician Signature:_______________________    Date: ___________ Time:  ____________    Physician for this visit and orders: Yossi Sneed DPM    [] Patient unable to sign Discharge Instructions given to ECF/Transportation/POA    Negative Pressure Wound Therapy Foot Anterior; Left (Active)   Number of days: 59       Wound 04/27/21 Foot Left;Lateral #1 (Active)   Wound Image   06/01/21 1331   Wound Etiology Diabetic Earl 3 04/27/21 1419   Wound Cleansed Cleansed with saline 06/08/21 1325   Dressing/Treatment Hydrating gel 06/01/21 1422   Offloading for Diabetic Foot Ulcers Yes (type) 05/11/21 1431   Wound Length (cm) 6.2 cm 06/08/21 1325   Wound Width (cm) 1 cm 06/08/21 1325   Wound Depth (cm) 0.1 cm 06/08/21 1325   Wound Surface Area (cm^2) 6.2 cm^2 06/08/21 1325   Change in Wound Size % (l*w) 48.33 06/08/21 1325   Wound Volume (cm^3) 0.62 cm^3 06/08/21 1325   Wound Healing % 90 06/08/21 1325   Post-Procedure Length (cm) 6.3 cm 06/08/21 1408   Post-Procedure Width (cm) 1.1 cm 06/08/21 1408   Post-Procedure Depth (cm) 0.3 cm 06/08/21 1408   Post-Procedure Surface Area (cm^2) 6.93 cm^2 06/08/21 1408   Post-Procedure Volume (cm^3) 2.08 cm^3 06/08/21 1408   Distance Tunneling (cm) 0 cm 06/08/21 1325   Tunneling Position ___ O'Clock 0 05/25/21 1318   Undermining Maxium Distance (cm) 0 06/08/21 1325   Wound Assessment Graft 06/08/21 1325   Drainage Amount Small 06/08/21 1325   Drainage Description Brown 06/08/21 1325   Odor Mild 06/08/21 1325   Jody-wound Assessment Maceration 06/08/21 1325   Margins Defined edges 06/08/21 1325   Wound Thickness Description not for Pressure Injury Full thickness 06/08/21 1325   Number of days: 42     Incision Toe (Comment  which one) Anterior;Right (Active)   Number of days:               Electronically signed by Elizabeth Beltrán DPM on 6/8/2021 at 3:05 PM

## 2021-06-10 DIAGNOSIS — E78.5 HYPERLIPIDEMIA, UNSPECIFIED HYPERLIPIDEMIA TYPE: ICD-10-CM

## 2021-06-10 RX ORDER — SIMVASTATIN 20 MG
TABLET ORAL
Qty: 90 TABLET | Refills: 0 | Status: SHIPPED | OUTPATIENT
Start: 2021-06-10 | End: 2021-06-22

## 2021-06-10 RX ORDER — LABETALOL 100 MG/1
TABLET, FILM COATED ORAL
Qty: 180 TABLET | Refills: 0 | Status: SHIPPED | OUTPATIENT
Start: 2021-06-10

## 2021-06-10 NOTE — TELEPHONE ENCOUNTER
Medication:   Requested Prescriptions     Pending Prescriptions Disp Refills    simvastatin (ZOCOR) 20 MG tablet [Pharmacy Med Name: SIMVASTATIN 20 MG TABLET] 90 tablet 0     Sig: TAKE ONE TABLET BY MOUTH ONCE NIGHTLY    labetalol (NORMODYNE) 100 MG tablet [Pharmacy Med Name: LABETALOL  MG TABLET] 180 tablet 0     Sig: TAKE ONE TABLET BY MOUTH TWICE A DAY        Last Filled:  03/05/21     Patient Phone Number: [phone] (home)     Last appt: 10/28/2020 Return for post op follow up for DM control    Next appt: Visit date not found    Last OARRS: No flowsheet data found.

## 2021-06-15 ENCOUNTER — TELEPHONE (OUTPATIENT)
Dept: WOUND CARE | Age: 76
End: 2021-06-15

## 2021-06-15 DIAGNOSIS — L97.211 VARICOSE VEINS OF RIGHT LOWER EXTREMITY WITH INFLAMMATION, WITH ULCER OF CALF LIMITED TO BREAKDOWN OF SKIN (HCC): ICD-10-CM

## 2021-06-15 DIAGNOSIS — I83.212 VARICOSE VEINS OF RIGHT LOWER EXTREMITY WITH INFLAMMATION, WITH ULCER OF CALF LIMITED TO BREAKDOWN OF SKIN (HCC): ICD-10-CM

## 2021-06-15 DIAGNOSIS — L97.422 ULCER OF HEEL AND MIDFOOT, LEFT, WITH FAT LAYER EXPOSED (HCC): ICD-10-CM

## 2021-06-22 ENCOUNTER — HOSPITAL ENCOUNTER (OUTPATIENT)
Dept: WOUND CARE | Age: 76
Discharge: HOME | End: 2021-06-22
Payer: MEDICARE

## 2021-06-22 VITALS
SYSTOLIC BLOOD PRESSURE: 147 MMHG | DIASTOLIC BLOOD PRESSURE: 67 MMHG | HEART RATE: 67 BPM | RESPIRATION RATE: 18 BRPM | TEMPERATURE: 97.1 F

## 2021-06-22 DIAGNOSIS — L97.211 VARICOSE VEINS OF RIGHT LOWER EXTREMITY WITH INFLAMMATION, WITH ULCER OF CALF LIMITED TO BREAKDOWN OF SKIN (HCC): ICD-10-CM

## 2021-06-22 DIAGNOSIS — I83.212 VARICOSE VEINS OF RIGHT LOWER EXTREMITY WITH INFLAMMATION, WITH ULCER OF CALF LIMITED TO BREAKDOWN OF SKIN (HCC): ICD-10-CM

## 2021-06-22 DIAGNOSIS — L97.422 ULCER OF HEEL AND MIDFOOT, LEFT, WITH FAT LAYER EXPOSED (HCC): Primary | ICD-10-CM

## 2021-06-22 PROCEDURE — 6370000000 HC RX 637 (ALT 250 FOR IP): Performed by: PODIATRIST

## 2021-06-22 PROCEDURE — 29581 APPL MULTLAYER CMPRN SYS LEG: CPT

## 2021-06-22 PROCEDURE — 11042 DBRDMT SUBQ TIS 1ST 20SQCM/<: CPT

## 2021-06-22 RX ORDER — BETAMETHASONE DIPROPIONATE 0.05 %
OINTMENT (GRAM) TOPICAL ONCE
Status: CANCELLED | OUTPATIENT
Start: 2021-06-22 | End: 2021-06-22

## 2021-06-22 RX ORDER — BACITRACIN, NEOMYCIN, POLYMYXIN B 400; 3.5; 5 [USP'U]/G; MG/G; [USP'U]/G
OINTMENT TOPICAL ONCE
Status: CANCELLED | OUTPATIENT
Start: 2021-06-22 | End: 2021-06-22

## 2021-06-22 RX ORDER — LIDOCAINE HYDROCHLORIDE 40 MG/ML
SOLUTION TOPICAL ONCE
Status: COMPLETED | OUTPATIENT
Start: 2021-06-22 | End: 2021-06-22

## 2021-06-22 RX ORDER — GINSENG 100 MG
CAPSULE ORAL ONCE
Status: CANCELLED | OUTPATIENT
Start: 2021-06-22 | End: 2021-06-22

## 2021-06-22 RX ORDER — CLOBETASOL PROPIONATE 0.5 MG/G
OINTMENT TOPICAL ONCE
Status: CANCELLED | OUTPATIENT
Start: 2021-06-22 | End: 2021-06-22

## 2021-06-22 RX ORDER — LIDOCAINE HYDROCHLORIDE 40 MG/ML
SOLUTION TOPICAL ONCE
Status: CANCELLED | OUTPATIENT
Start: 2021-06-22 | End: 2021-06-22

## 2021-06-22 RX ORDER — LIDOCAINE 40 MG/G
CREAM TOPICAL ONCE
Status: CANCELLED | OUTPATIENT
Start: 2021-06-22 | End: 2021-06-22

## 2021-06-22 RX ORDER — LIDOCAINE HYDROCHLORIDE 20 MG/ML
JELLY TOPICAL ONCE
Status: CANCELLED | OUTPATIENT
Start: 2021-06-22 | End: 2021-06-22

## 2021-06-22 RX ORDER — GENTAMICIN SULFATE 1 MG/G
OINTMENT TOPICAL ONCE
Status: CANCELLED | OUTPATIENT
Start: 2021-06-22 | End: 2021-06-22

## 2021-06-22 RX ORDER — BACITRACIN ZINC AND POLYMYXIN B SULFATE 500; 1000 [USP'U]/G; [USP'U]/G
OINTMENT TOPICAL ONCE
Status: CANCELLED | OUTPATIENT
Start: 2021-06-22 | End: 2021-06-22

## 2021-06-22 RX ORDER — LIDOCAINE 50 MG/G
OINTMENT TOPICAL ONCE
Status: CANCELLED | OUTPATIENT
Start: 2021-06-22 | End: 2021-06-22

## 2021-06-22 RX ADMIN — LIDOCAINE HYDROCHLORIDE: 40 SOLUTION TOPICAL at 13:48

## 2021-06-22 ASSESSMENT — PAIN DESCRIPTION - PAIN TYPE: TYPE: ACUTE PAIN

## 2021-06-22 ASSESSMENT — PAIN DESCRIPTION - LOCATION: LOCATION: HEAD

## 2021-06-22 ASSESSMENT — PAIN SCALES - GENERAL: PAINLEVEL_OUTOF10: 3

## 2021-06-22 ASSESSMENT — PAIN DESCRIPTION - DESCRIPTORS: DESCRIPTORS: ACHING

## 2021-06-22 NOTE — PROGRESS NOTES
[address]   Progress Note and Procedure Note      Margarita Bernard  MEDICAL RECORD NUMBER:  [id number]  AGE: 76 y.o. GENDER: female  : 1945  EPISODE DATE:  2021    Subjective:     Chief Complaint   Patient presents with    Wound Check     bilateral lower extremities         HISTORY of PRESENT ILLNESS HPI     Margarita Bernard is a 76 y.o. female who presents today for wound/ulcer evaluation. History of Wound Context: patient presents today for follow up of a DFU s/p I&D. She has kept her dressing clean and intact. She has been ambulating in CAM boot with PT. patient inquired today if she can transition into shoes, but admits that she does not have any. Wound/Ulcer Pain Timing/Severity: none  Quality of pain: N/A  Severity:  0 / 10   Modifying Factors: None  Associated Signs/Symptoms: none    Ulcer Identification:  Ulcer Type: diabetic and non-healing surgical    Contributing Factors: diabetes and poor glucose control    Acute Wound: N/A    PAST MEDICAL HISTORY        Diagnosis Date    Acute respiratory failure with hypoxia ([address] 2019    Hx of blood clots 2019    PE    Hyperlipidemia     Hypertension     Mallet finger 3/4/2015    PONV (postoperative nausea and vomiting)     Type II or unspecified type diabetes mellitus without mention of complication, not stated as uncontrolled        PAST SURGICAL HISTORY    Past Surgical History:   Procedure Laterality Date    BREAST BIOPSY Right     FOOT DEBRIDEMENT Left 2021    LEFT LOWER EXTREMITY FOOT DEBRIDEMENT INCISION AND DRAINAGE WITH  5TH METATARSAL RESECTION performed by Saskia Barraza DPM at [address] Left 2021    LEFT LOWER EXTREMITY EXCISIONAL [address] 11X4 CM WOUND; LEFT FIFTH TOE AMPUTATION, WOUND VAC APPLICAITON performed by Felecia Degroot DPM at [facility] Left 2021    LEFT LOWER EXTREMITY: INCISION AND DRAINAGE.  EXCISIONAL DEBRIDEMENT Take 10 mg by mouth daily      Cholecalciferol (VITAMIN D3) 75 MCG (3000 UT) TABS Take by mouth 1.25mg (18668QG) daily      ammonium lactate (LAC-HYDRIN) 12 % lotion Apply topically as needed for Dry Skin Apply topically as needed.  Multiple Vitamins-Minerals (CEROVITE SENIOR PO) Take 1 tablet by mouth daily      insulin NPH (HUMULIN N;NOVOLIN N) 100 UNIT/ML injection pen Inject 30 Units into the skin 2 times daily (before meals) 5 pen 3    vitamin B-12 (CYANOCOBALAMIN) 100 MCG tablet Take 50 mcg by mouth daily      acetaminophen (TYLENOL) 500 MG tablet Take 500 mg by mouth every 6 hours as needed for Pain      ONETOUCH ULTRA strip USE ONE STRIP TO TEST TWICE A DAY AS NEEDED 100 strip 2    PROMOGRAN WOUND DRESSING MATRIX Apply topically Trim product to match the size of the wound. Apply topically. Moisten lightly with saline. Cover with dry sterile dressing. 1 Package 2    SODIUM CHLORIDE, EXTERNAL, (SALINE WOUND [address] [facility]) 0.9 % SOLN Moisten Promogran wound dressing with saline with each application. 1 Can 0    Handicap Placard MISC by Does not apply route Duration 5 years 1 each 0    Insulin Syringe-Needle U-100 (BD INSULIN SYRINGE U/F) 31G X 5/16\" 0.5 ML MISC USE ONE SYRINGE FOUR TIMES A  each 5    ONETOUCH DELICA LANCETS 32H MISC USE TO TEST BLOOD SUGAR THREE TIMES A  each 0    Blood Pressure KIT Apply 1 applicator topically 2 times daily 1 kit 0    blood glucose monitor strips Test 3-4 times a day & as needed for symptoms of irregular blood glucose. DX: E11.9. Supply the strips that go with her meter and approved by insurance 300 strip 1    Lancets MISC 1 each by Does not apply route daily Test 3-4 times a day & as needed for symptoms of irregular blood glucose. DX: E11.9.  Supply the lancets that go with her meter and approved by insurance 300 each 3    Continuous Blood Gluc Sensor (FREESTYLE VÍCTOR 14 DAY SENSOR) [facility] CHECK GLUCOSE 4 TIMES DAILY 2 each 2    B-D ULTRAFINE III SHORT PEN 31G X 8 MM MISC USE WITH INSULIN TWO TIMES A DAY AS DIRECTED 100 each 2    glucose monitoring kit (FREESTYLE) monitoring kit Use a ONE TOUCH 1 kit 0    EPINEPHrine (EPIPEN 2-OSMAR) 0.3 MG/0.3ML SOAJ injection Inject 0.3 mLs into the muscle once for 1 dose. Use as directed for allergic reaction (Patient not taking: Reported on 1/22/2020) 0.3 mL 2    [DISCONTINUED] sitagliptan (JANUVIA) 100 MG tablet Take 1 tablet by mouth daily for 360 days. 30 tablet 3     No current facility-administered medications on file prior to encounter. REVIEW OF SYSTEMS  Review of Systems    Pertinent items are noted in HPI. Review of Systems: A 12 point review of symptoms is unremarkable with the exception of the chief complaint. Patient specifically denies nausea, fever, vomiting, chills, shortness of breath, chest pain, abdominal pain, constipation or difficulty urinating. Objective:      BP (!) 147/67   Pulse 67   Temp 97.1 °F (36.2 °C) (Temporal)   Resp 18   LMP  (LMP Unknown)     Wt Readings from Last 3 Encounters:   04/07/21 256 lb (116.1 kg)   12/29/20 257 lb (116.6 kg)   12/22/20 157 lb (71.2 kg)       PHYSICAL EXAM  Physical Exam    Ulceration noted on the lateral aspect of the left foot. Wound has fibrotic and nonviable tissue that extends down through and includes the subcutaneous tissue. After debridement the wound has a granular base. There is no surrounding erythema, edema, warmth or malodor noted. The wound does not probe or track to bone. The edges of the ulcer are no longer macerated. Edema on the left lower leg is well controlled with compression. 2+ pitting edema noted on the right lower extremity. Patient has brawny pigmentary changes bilaterally with hypertrophic skin changes noted.          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 Performed by: Mimi Camarena DPM    Consent obtained:  Yes    Time out taken:  Yes    Pain Control: Anesthetic  Anesthetic: 4% Lidocaine Liquid Topical       Debridement: Excisional Debridement    Using curette the wound(s)/ulcer(s) was/were debrided down through and including the removal of epidermis, dermis and subcutaneous tissue to prepare the wound for TheraSkin application. Devitalized Tissue Debrided:  fibrin and slough    Pre Debridement Measurements:  Are located in the Wound/Ulcer Documentation Flow Sheet    Diabetic/Pressure/Non Pressure Ulcers only:  Ulcer: Diabetic ulcer, fat layer exposed     Wound/Ulcer #: 1    Post Debridement Measurements:  Wound/Ulcer Descriptions are Pre Debridement except measurements:    Negative Pressure Wound Therapy Foot Anterior; Left (Active)   Number of days: 73       Wound 04/27/21 Foot Left;Lateral #1 (Active)   Wound Image   06/01/21 1331   Wound Etiology Diabetic Earl 3 04/27/21 1419   Wound Cleansed Cleansed with saline 06/22/21 1336   Dressing/Treatment Collagen 06/22/21 1440   Offloading for Diabetic Foot Ulcers Yes (type) 05/11/21 1431   Wound Length (cm) 2 cm 06/22/21 1336   Wound Width (cm) 0.1 cm 06/22/21 1336   Wound Depth (cm) 0.2 cm 06/22/21 1336   Wound Surface Area (cm^2) 0.2 cm^2 06/22/21 1336   Change in Wound Size % (l*w) 98.33 06/22/21 1336   Wound Volume (cm^3) 0.04 cm^3 06/22/21 1336   Wound Healing % 99 06/22/21 1336   Post-Procedure Length (cm) 2.1 cm 06/22/21 1429   Post-Procedure Width (cm) 0.2 cm 06/22/21 1429   Post-Procedure Depth (cm) 0.4 cm 06/22/21 1429   Post-Procedure Surface Area (cm^2) 0.42 cm^2 06/22/21 1429   Post-Procedure Volume (cm^3) 0.17 cm^3 06/22/21 1429   Distance Tunneling (cm) 0 cm 06/22/21 1336   Tunneling Position ___ O'Clock 0 06/22/21 1336   Undermining Starts ___ O'Clock 0 06/22/21 1336   Undermining Ends___ O'Clock 0 06/22/21 1336   Undermining Maxium Distance (cm) 0 06/22/21 1336   Wound Assessment Pink/red 06/22/21 1336   Drainage Amount Small 06/22/21 1336   Drainage Description Serosanguinous 06/22/21 1336   Odor None 06/22/21 1336   Jody-wound Assessment Intact 06/22/21 1336   Margins Defined edges 06/22/21 1336   Wound Thickness Description not for Pressure Injury Full thickness 06/22/21 1336   Number of days: 56     Incision Toe (Comment  which one) Anterior;Right (Active)   Number of days: Total Surface Area Debrided:  0.42 sq cm     Estimated Blood Loss:  Minimal    Hemostasis Achieved:  by pressure    Procedural Pain:  0  / 10     Post Procedural Pain:  0 / 10     Response to treatment:  Well tolerated by patient. Plan:   E/ Mx 30 minutes. Rx extra-depth diabetic shoes with custom orthotics to accommodate bilateral fifth ray amputations. Patient was referred to [facility] prosthetics for fabrication. As soon as shoes are available she may transition into them and ambulate as tolerated. Patient may stand and weight-bear in CAM boot as tolerated with physical therapy. Treatment Note please see attached Discharge Instructions    Written patient dismissal instructions given to patient and signed by patient or POA. RTC 1 week      Discharge [facility] Physician Orders and Discharge Instructions  [address] [place] [address]  Telephone: [phone]    NAME:  Marialuisa Romano OF BIRTH:  1945  MEDICAL RECORD NUMBER:  [id number]  DATE:  6/22/2021    Skilled Nursing Facility: [address]     Change dressing?: no     Wound Cleansing:   · With each dressing change, rinse wounds with 0.9% Saline. · Keep wounds dry in the shower.     Vashe wound cleanser:  []? Instructions for Vashe Wash solution ONLY: Apply enough Vashe to soak a piece of gauze and place on wound bed for 5-10 minutes. DO NOT rinse after Vashe has been applied. Follow dressing application as instructed below.     Topical Treatments:  Do not apply lotions, creams, or ointments to wound bed unless directed as followed:       [x]? Apply to yolis-wound:          [x]? Moisturizing lotion  []? Antifungal ointment            []? No-Sting barrier film   []? Zinc paste  []? Other:  []? Apply to affected limb:                Dressings:                  Wound Location: Left Foot                []? Apply Primary Dressing to wound:                                                          [x]? Collagen (i.e. Puracol)         [x]? Cover and Secure with:                           [x]? Dry Gauze                            Avoid contact of tape with skin if possible.     [x]? Change dressing:            []? Daily           []? Every Other Day                []? Three times per week: []? Mon/Wed/Fri      []? Tue/Thurs/Sat              []? Once a week                      [x]? Do Not Change Dressing  []? Other:          Edema Control:  Apply:        []? Compression Stocking       []? Left Lower Leg      []? Right Lower Leg                             [x]? Spandagrip                        []? Left Lower Leg      [x]? Right Lower Leg                          Strength:High compression  20-30 mm/Hg  · They should be applied to affected leg(s) from mid foot to knee making sure to cover the heel. · Apply every morning immediately when getting up. Remove every night before going to bed. [x]? Elevate leg(s) above the level of the heart for 30 minutes 4-5 times a day and/or when sitting. [x]?  Avoid prolonged standing in one place.         Multilayer Compression Wrap: Type: 2 Layer compression wrap                  Applied in Clinic on 6/22/2021 to the :  Left lower leg(s)      · Do not get leg(s) with wrap wet. · If wraps become too tight call the center or completely remove the wrap. · Elevate leg(s) above the level of the heart when sitting. · Avoid prolonged standing in one place.                  []? Skilled Nursing: remove wrap, cleanse affected leg(s) with gentle soap and water, apply moisturizer, and apply dressings as ordered above and reapply compression wraps every:      Patient may participate in therapy with the following restrictions for off-Loading:   [x]? Off-loading (keeping weight off as much as you can) when:        []? walking       [x]? in bed     [x]? sitting     []? Total non-weight bearing:  []? Right Leg  []? Left Leg                []? Partial weight bearing:   []? Right Leg  []? Left Leg                []? Heel weight bearing   []? Toe-touch weight bearing  []? Transfers only     [x]? No Weight bearing restrictions     [x]? Assistive Device:   [x]? Milbert Vangie        []? Crutches                 []? Wheelchair                         []? Roll About              []? Surgical shoe/Darco          []? Podus Boot(s)        []? Heel Protector Soft Boot(s) - DO NOT WALK WHILE WEARING                         [x]? Cast/CAM Boot                  []? [employer]           []? Other:    Dietary:   Important dietary reminders:  1. Increase Protein intake (i.e. Lean meats, fish, eggs, legumes, and yogurt)  2. No added salt  3. If diabetic, follow a diabetic diet and check glucose prior to meals or as instructed by your physician.    [] SNF: Please have dietician evaluate patient for nutritional needs    Dietary Supplements: [] [employer] via [place] [facility] [] 30ml ProMod/ProStat [] Other:   Take supplements twice a day or as directed as followed:     Return Appointment:   Return Appointment: With Dr Ellie Woods  in  [address] [place] [address]).   o Schedule weekly until (Date): 6/29      [x] Orders placed during your visit:   Orders Placed This Encounter   Procedures    Initiate Outpatient Wound Care Protocol     Call [facility] [address]  [address] [place]  [phone]  Fax: [phone]  To Get fitted for your inserts. A prescription was sent with you. Your nurse  is:  Gerda     Electronically signed by Wilber Mcbride RN on 6/22/2021 at 2:30 PM     [address]: Should you experience any significant changes in your wound(s) or have questions about your wound care, please contact the [address] [place] [address] at [phone]. Our hours vary so please leave a message. Please give us 24-48 hours to return your call. If you need help with your wounds and cannot wait until we are available, contact your PCP or go to the hospital emergency room. Physician Signature:_______________________    Date: ___________ Time:  ____________    Physician for this visit and orders: Brook Ray DPM    [] Patient unable to sign Discharge Instructions given to ECF/Transportation/POA    Negative Pressure Wound Therapy Foot Anterior; Left (Active)   Number of days: 73       Wound 04/27/21 Foot Left;Lateral #1 (Active)   Wound Image   06/01/21 1331   Wound Etiology Diabetic Earl 3 04/27/21 1419   Wound Cleansed Cleansed with saline 06/22/21 1336   Dressing/Treatment Hydrating gel 06/08/21 1419   Offloading for Diabetic Foot Ulcers Yes (type) 05/11/21 1431   Wound Length (cm) 2 cm 06/22/21 1336   Wound Width (cm) 0.1 cm 06/22/21 1336   Wound Depth (cm) 0.2 cm 06/22/21 1336   Wound Surface Area (cm^2) 0.2 cm^2 06/22/21 1336   Change in Wound Size % (l*w) 98.33 06/22/21 1336   Wound Volume (cm^3) 0.04 cm^3 06/22/21 1336   Wound Healing % 99 06/22/21 1336   Post-Procedure Length (cm) 2.1 cm 06/22/21 1429   Post-Procedure Width (cm) 0.2 cm 06/22/21 1429   Post-Procedure Depth (cm) 0.4 cm 06/22/21 1429   Post-Procedure Surface Area (cm^2) 0.42 cm^2 06/22/21 1429   Post-Procedure Volume (cm^3) 0.17 cm^3 06/22/21 1429   Distance Tunneling (cm) 0 cm 06/22/21 1336   Tunneling Position ___ O'Clock 0 06/22/21 1336   Undermining Starts ___ O'Clock 0 06/22/21 1336   Undermining Ends___ O'Clock 0 06/22/21 1336   Undermining Maxium Distance (cm) 0 06/22/21 1336   Wound Assessment Pink/red 06/22/21 1336   Drainage Amount Small 06/22/21 1336   Drainage Description Serosanguinous 06/22/21 1336   Odor None 06/22/21 1336   Jody-wound Assessment Intact 06/22/21 1336   Margins Defined edges 06/22/21 1336   Wound Thickness Description not for Pressure Injury Full thickness 06/22/21 1336   Number of days: 56     Incision Toe (Comment  which one) Anterior;Right (Active)   Number of days:               Electronically signed by Brook Ray DPM on 6/22/2021 at 3:05 PM

## 2021-06-22 NOTE — PROGRESS NOTES
Multilayer Compression Wrap   (Not Unna) Below the Knee    NAME:  Valerie Hooker OF BIRTH:  1945  MEDICAL RECORD NUMBER:  [id number]  DATE:  6/22/2021        Removed old Multilayer wrap if present and washed leg with mild soap/water.   Applied moisturizing agent to dry skin as needed.   Applied primary and secondary dressing as ordered     Applied multilayered dressing below the knee to Left lower leg(s)  (2 Layer compression wrap ) .   Instructed patient/caregiver not to remove dressing and to keep it clean and dry.   Instructed patient/caregiver on complications to report to provider, such as pain, numbness in toes, heavy drainage, and slippage of dressing.   Instructed patient on purpose of compression dressing and on activity and exercise recommendations.     Applied per   Guidelines    Electronically signed by Tianna Valerio RN on 6/22/2021 at 2:41 PM

## 2021-06-24 ENCOUNTER — TELEPHONE (OUTPATIENT)
Dept: PRIMARY CARE CLINIC | Age: 76
End: 2021-06-24

## 2021-06-24 NOTE — TELEPHONE ENCOUNTER
----- Message from Jaqueline Andersen sent at 6/24/2021  4:33 PM EDT -----  Subject: Message to Provider    QUESTIONS  Information for Provider? Pt is returning a message that she received a   message saying to call the office about at week ago but doesn't know what   the message was about and there was not encounter in epic.  ---------------------------------------------------------------------------  --------------  [address]  What is the best way for the office to contact you? OK to leave message on   voicemail  Preferred Call Back Phone Number? [phone]  ---------------------------------------------------------------------------  --------------  SCRIPT ANSWERS  Relationship to Patient?  Self

## 2021-06-24 NOTE — TELEPHONE ENCOUNTER
Patient said she was returning a call from our office. I spoke to her and told her I did not see anything in her chart and that maybe someone was calling about her upcoming 06/29/2021 appt with another office.

## 2021-06-24 NOTE — TELEPHONE ENCOUNTER
----- Message from Alvino Radford sent at 6/24/2021  4:33 PM EDT -----  Subject: Message to Provider    QUESTIONS  Information for Provider? Pt is returning a message that she received a   message saying to call the office about at week ago but doesn't know what   the message was about and there was not encounter in epic.  ---------------------------------------------------------------------------  --------------  [address]  What is the best way for the office to contact you? OK to leave message on   voicemail  Preferred Call Back Phone Number? [phone]  ---------------------------------------------------------------------------  --------------  SCRIPT ANSWERS  Relationship to Patient?  Self

## 2021-06-29 ENCOUNTER — TELEPHONE (OUTPATIENT)
Dept: WOUND CARE | Age: 76
End: 2021-06-29

## 2021-06-29 DIAGNOSIS — L97.212 VARICOSE VEINS OF RIGHT LOWER EXTREMITY WITH INFLAMMATION, WITH ULCER OF CALF WITH FAT LAYER EXPOSED (HCC): ICD-10-CM

## 2021-06-29 DIAGNOSIS — L97.422 ULCER OF HEEL AND MIDFOOT, LEFT, WITH FAT LAYER EXPOSED (HCC): ICD-10-CM

## 2021-06-29 DIAGNOSIS — I83.212 VARICOSE VEINS OF RIGHT LOWER EXTREMITY WITH INFLAMMATION, WITH ULCER OF CALF WITH FAT LAYER EXPOSED (HCC): ICD-10-CM

## 2021-07-06 ENCOUNTER — TELEPHONE (OUTPATIENT)
Dept: WOUND CARE | Age: 76
End: 2021-07-06

## 2021-07-06 DIAGNOSIS — L97.212 VARICOSE VEINS OF RIGHT LOWER EXTREMITY WITH INFLAMMATION, WITH ULCER OF CALF WITH FAT LAYER EXPOSED (HCC): ICD-10-CM

## 2021-07-06 DIAGNOSIS — L97.422 ULCER OF HEEL AND MIDFOOT, LEFT, WITH FAT LAYER EXPOSED (HCC): ICD-10-CM

## 2021-07-06 DIAGNOSIS — I83.212 VARICOSE VEINS OF RIGHT LOWER EXTREMITY WITH INFLAMMATION, WITH ULCER OF CALF WITH FAT LAYER EXPOSED (HCC): ICD-10-CM

## 2021-07-06 NOTE — TELEPHONE ENCOUNTER
Call received from [facility] stating pt will miss her appt again due to lack of transportation. Wound care orders given. Pt scheduled for next week.

## 2021-07-20 ENCOUNTER — TELEPHONE (OUTPATIENT)
Dept: PRIMARY CARE CLINIC | Age: 76
End: 2021-07-20

## 2022-09-17 NOTE — TELEPHONE ENCOUNTER
2nd Attempt Documentation:  2nd attempt to contact this patient regarding the previous message  CLINICAL PHARMACY: [facility]  Patient unavailable at the time of call. Left following message on home TAD: please call back at toll-free [phone] option 7 to retrieve previous message. Letter mailed to patient. 214

## 2024-02-18 NOTE — PLAN OF CARE
Please call patient and verify which pharmacy.   Problem: Pain:  Goal: Pain level will decrease  Description: Pain level will decrease  Outcome: Ongoing  Note: Pt has complained of shoulder pain and day nurse administer pain medication and repositioned pt. Pt is satisfied.       Problem: Skin Integrity:  Goal: Will show no infection signs and symptoms  Description: Will show no infection signs and symptoms  Outcome: Ongoing

## (undated) DEVICE — PODIATRY PK

## (undated) DEVICE — SET GRAV VENT NVENT CK VLV 3 NDL FREE PRT 10 GTT

## (undated) DEVICE — GOWN,SIRUS,NON REINFRCD,LARGE,SET IN SL: Brand: MEDLINE

## (undated) DEVICE — SPONGE LAP W18XL18IN WHT COT 4 PLY FLD STRUNG RADPQ DISP ST

## (undated) DEVICE — SOLUTION IV 1000ML LAC RINGERS PH 6.5 INJ USP VIAFLX PLAS

## (undated) DEVICE — BANDAGE,GAUZE,CONFORMING,3"X75",STRL,LF: Brand: MEDLINE

## (undated) DEVICE — E-Z CLEAN, NON-STICK, PTFE COATED, ELECTROSURGICAL BLADE ELECTRODE, 2.5 INCH (6.35 CM): Brand: EZ CLEAN

## (undated) DEVICE — COVER,MAYO STAND,XL,STERILE: Brand: MEDLINE

## (undated) DEVICE — GAUZE,PACKING STRIP,IODOFORM,1/2"X5YD,ST: Brand: CURAD

## (undated) DEVICE — GLOVE ORANGE PI 7 1/2   MSG9075

## (undated) DEVICE — SUTURE VCRL SZ 3-0 L27IN ABSRB UD L26MM SH 1/2 CIR J416H

## (undated) DEVICE — GOWN,SIRUS,POLYRNF,SETINSLV,L,20/CS: Brand: MEDLINE

## (undated) DEVICE — PLATE ES AD W 9FT CRD 2

## (undated) DEVICE — SOLUTION IV IRRIG WATER 1000ML POUR BRL 2F7114

## (undated) DEVICE — HANDPIECE SET WITH HIGH FLOW TIP AND SUCTION TUBE: Brand: INTERPULSE

## (undated) DEVICE — STANDARD HYPODERMIC NEEDLE,POLYPROPYLENE HUB: Brand: MONOJECT

## (undated) DEVICE — PACK EXTREMITY XR

## (undated) DEVICE — SUTURE ETHLN SZ 4-0 L18IN NONABSORBABLE BLK L19MM PS-2 3/8 1667H

## (undated) DEVICE — Z INACTIVE NO SUPPLIER SOLUTIONIRRIG 3000ML 0.9% SOD CHL FLX CONT [79720808] [HOSPIRA WORLDWIDE INC]

## (undated) DEVICE — DRESSING PETRO W3XL8IN OIL EMUL N ADH GZ KNIT IMPREG CELOS

## (undated) DEVICE — GARMENT,MEDLINE,DVT,INT,CALF,MED, GEN2: Brand: MEDLINE

## (undated) DEVICE — SPONGE,LAP,18"X18",DLX,XR,ST,5/PK,40/PK: Brand: MEDLINE

## (undated) DEVICE — MEDC BAG ICE SM REFILLABLE W/TIES

## (undated) DEVICE — ZIMMER® STERILE DISPOSABLE TOURNIQUET CUFF WITH PLC, DUAL PORT, SINGLE BLADDER, 24 IN. (61 CM)

## (undated) DEVICE — DRAPE EQUIP C ARM MINI 10000100] TIDI PRODUCTS INC]

## (undated) DEVICE — SWAB CULT DBL W/O CHAR RAYON TIP AMIES GEL CLMN FOR COLL

## (undated) DEVICE — STERILE POLYISOPRENE POWDER-FREE SURGICAL GLOVES: Brand: PROTEXIS

## (undated) DEVICE — 1010 S-DRAPE TOWEL DRAPE 10/BX: Brand: STERI-DRAPE™

## (undated) DEVICE — COAXIAL HIGH FLOW TIP WITH SOFT SHIELD

## (undated) DEVICE — BANDAGE,GAUZE,BULKEE II,4.5"X4.1YD,STRL: Brand: MEDLINE

## (undated) DEVICE — SUTURE VCRL SZ 3-0 L27IN ABSRB UD L26MM CT-2 1/2 CIR J232H

## (undated) DEVICE — ZIMMER® STERILE DISPOSABLE TOURNIQUET CUFF WITH PLC, DUAL PORT, SINGLE BLADDER, 18 IN. (46 CM)

## (undated) DEVICE — TRAY PREP DRY W/ PREM GLV 2 APPL 6 SPNG 2 UNDPD 1 OVERWRAP

## (undated) DEVICE — COVER LT HNDL BLU PLAS

## (undated) DEVICE — COVER XR CASS W20XL41IN UNIV ADH STRP

## (undated) DEVICE — NEEDLE HYPO 25GA L1.5IN BLU POLYPR HUB S STL REG BVL STR

## (undated) DEVICE — GLOVE,SURG,SENSICARE,ALOE,LF,PF,7: Brand: MEDLINE

## (undated) DEVICE — 3M™ TEGADERM™ TRANSPARENT FILM DRESSING FRAME STYLE, 1624W, 2-3/8 IN X 2-3/4 IN (6 CM X 7 CM), 100/CT 4CT/CASE: Brand: 3M™ TEGADERM™

## (undated) DEVICE — SOLUTION IV IRRIG 500ML 0.9% SODIUM CHL 2F7123

## (undated) DEVICE — BLADE SAW SAG MIC 25.5X9.5X0.6 MM FN TOOTH FOR MICRO100

## (undated) DEVICE — GLOVE SURG SZ 8 L12IN FNGR THK94MIL STD WHT LTX FREE

## (undated) DEVICE — GLOVE SURG SZ 65 L12IN FNGR THK94MIL STD WHT LTX FREE

## (undated) DEVICE — PADDING CAST W4INXL4YD NONSTERILE COT RAYON MICROPLEATED

## (undated) DEVICE — JEWISH HOSPITAL TURNOVER KIT: Brand: MEDLINE INDUSTRIES, INC.

## (undated) DEVICE — GLOVE SURG SZ 75 L12IN FNGR THK94MIL STD WHT LTX FREE

## (undated) DEVICE — CANISTER NEG PRSS 500ML WHT SENSATRAC TBNG CLMP CONN

## (undated) DEVICE — SOLUTION IV 1000ML 0.9% SOD CHL

## (undated) DEVICE — SOLUTION IRRIG 2000ML 0.9% SOD CHL USP UROMATIC PLAS CONT

## (undated) DEVICE — KIT NEG PRSS M W12.5XH3.2XL18CM 2 SHT STD DRP 1 SENSATRAC

## (undated) DEVICE — SET ADMIN PRIMING 7ML L30IN 7.35LB 20 GTT 2ND RLER CLMP

## (undated) DEVICE — INTENDED FOR TISSUE SEPARATION, AND OTHER PROCEDURES THAT REQUIRE A SHARP SURGICAL BLADE TO PUNCTURE OR CUT.: Brand: BARD-PARKER ® CARBON RIB-BACK BLADES

## (undated) DEVICE — INTENDED FOR TISSUE SEPARATION, AND OTHER PROCEDURES THAT REQUIRE A SHARP SURGICAL BLADE TO PUNCTURE OR CUT.: Brand: BARD-PARKER ® STAINLESS STEEL BLADES

## (undated) DEVICE — CATHETER IV 20GA L1.25IN PNK FEP SFTY STR HUB RADPQ DISP

## (undated) DEVICE — PADDING,UNDERCAST,COTTON, 4"X4YD STERILE: Brand: MEDLINE

## (undated) DEVICE — GLOVE SURG SZ 8 L12IN FNGR THK79MIL GRN LTX FREE

## (undated) DEVICE — BASIC SINGLE BASIN 1-LF: Brand: MEDLINE INDUSTRIES, INC.

## (undated) DEVICE — PADDING CAST W4INXL4YD HIGHLY ABSRB THAN COT EZ APPL

## (undated) DEVICE — UNDERGLOVE SURG SZ 8 BLU LTX FREE SYN POLYISOPRENE POLYMER

## (undated) DEVICE — SYRINGE MED 10ML LUERLOCK TIP W/O SFTY DISP

## (undated) DEVICE — ZIMMER® STERILE DISPOSABLE TOURNIQUET CUFF, DUAL PORT, SINGLE BLADDER, 18 IN. (46 CM)

## (undated) DEVICE — HANDPIECE SUCTION TUBING INTERPULSE 10FT

## (undated) DEVICE — GLOVE SURG SZ 9 THK91MIL LTX FREE SYN POLYISOPRENE ANTI

## (undated) DEVICE — BANDAGE,GAUZE,4.5"X4.1YD,STERILE,LF: Brand: MEDLINE

## (undated) DEVICE — SUTURE ETHLN SZ 3-0 L18IN NONABSORBABLE BLK L24MM PS-1 3/8 1663G

## (undated) DEVICE — SUTURE VCRL + SZ 2-0 L27IN ABSRB WHT SH 1/2 CIR TAPERCUT VCP417H